# Patient Record
Sex: FEMALE | Race: WHITE | NOT HISPANIC OR LATINO | Employment: OTHER | URBAN - METROPOLITAN AREA
[De-identification: names, ages, dates, MRNs, and addresses within clinical notes are randomized per-mention and may not be internally consistent; named-entity substitution may affect disease eponyms.]

---

## 2017-01-05 DIAGNOSIS — M25.552 PAIN OF LEFT HIP JOINT: ICD-10-CM

## 2017-01-05 RX ORDER — IBUPROFEN 600 MG/1
600 TABLET ORAL EVERY 6 HOURS PRN
Qty: 30 TABLET | Refills: 1 | Status: SHIPPED | OUTPATIENT
Start: 2017-01-05 | End: 2017-02-21 | Stop reason: SDUPTHER

## 2017-01-05 NOTE — TELEPHONE ENCOUNTER
Pt Pharmacy called requesting a refill of Ibuprofen 600 mg. Pt last seen on 11/30/16 by Dr Dunn.

## 2017-01-06 NOTE — TELEPHONE ENCOUNTER
Called pt no answer. Left an detailed voice message for pt regarding her rx Ibuprofen 600 mg being approved for an refill an sent to pharmacy.

## 2017-01-25 ENCOUNTER — TELEPHONE (OUTPATIENT)
Dept: UROGYNECOLOGY | Facility: CLINIC | Age: 82
End: 2017-01-25

## 2017-01-25 NOTE — TELEPHONE ENCOUNTER
Called pt no answer. Left an detailed voice message for pt to call the office to reschedule missed apt. That was scheduled for today at 2:30 pm.

## 2017-01-30 RX ORDER — MELOXICAM 15 MG/1
TABLET ORAL
Qty: 30 TABLET | Refills: 0 | OUTPATIENT
Start: 2017-01-30

## 2017-02-06 RX ORDER — CIPROFLOXACIN 500 MG/1
TABLET ORAL
Qty: 14 TABLET | Refills: 0 | Status: ON HOLD | OUTPATIENT
Start: 2017-02-06 | End: 2017-05-23 | Stop reason: HOSPADM

## 2017-02-18 ENCOUNTER — TELEPHONE (OUTPATIENT)
Dept: INTERNAL MEDICINE | Facility: CLINIC | Age: 82
End: 2017-02-18

## 2017-02-18 NOTE — TELEPHONE ENCOUNTER
Spoke with the patient who advised that her heart was pounding, complained of a headache, and chest pain. She was concerned that she may be having a heart attack advised the patient to call 911 immediately after getting off the phone.

## 2017-02-18 NOTE — TELEPHONE ENCOUNTER
----- Message from Twyla Vanegas sent at 2/18/2017 10:39 AM CST -----  Contact: Self. Call [phone]  Caller complained of chest pains and pounding cooper. She thought she maybe having an heart attack. Call given to Alina.

## 2017-02-20 RX ORDER — AMLODIPINE BESYLATE 5 MG/1
TABLET ORAL
Qty: 30 TABLET | Refills: 5 | Status: SHIPPED | OUTPATIENT
Start: 2017-02-20 | End: 2017-04-10 | Stop reason: SDUPTHER

## 2017-02-21 DIAGNOSIS — M25.552 PAIN OF LEFT HIP JOINT: ICD-10-CM

## 2017-02-21 RX ORDER — LEVOTHYROXINE SODIUM 50 UG/1
50 TABLET ORAL
Qty: 30 TABLET | Refills: 5 | Status: SHIPPED | OUTPATIENT
Start: 2017-02-21 | End: 2017-04-10 | Stop reason: SDUPTHER

## 2017-02-21 RX ORDER — IBUPROFEN 600 MG/1
600 TABLET ORAL EVERY 6 HOURS PRN
Qty: 30 TABLET | Refills: 5 | Status: ON HOLD | OUTPATIENT
Start: 2017-02-21 | End: 2018-01-12

## 2017-03-01 RX ORDER — MELOXICAM 15 MG/1
TABLET ORAL
Qty: 30 TABLET | Refills: 0 | OUTPATIENT
Start: 2017-03-01

## 2017-03-27 ENCOUNTER — TELEPHONE (OUTPATIENT)
Dept: UROGYNECOLOGY | Facility: CLINIC | Age: 82
End: 2017-03-27

## 2017-03-27 NOTE — TELEPHONE ENCOUNTER
Spoke to the pharmacist Natalya with [facility] and called in pt's 90 day supply Ditropan XL 5 mg tablets.

## 2017-03-30 RX ORDER — MELOXICAM 15 MG/1
TABLET ORAL
Qty: 30 TABLET | Refills: 0 | OUTPATIENT
Start: 2017-03-30

## 2017-03-30 RX ORDER — TRAMADOL HYDROCHLORIDE 50 MG/1
TABLET ORAL
Qty: 90 TABLET | Refills: 1 | Status: SHIPPED | OUTPATIENT
Start: 2017-03-30 | End: 2017-04-29 | Stop reason: SDUPTHER

## 2017-04-10 DIAGNOSIS — R39.15 URINARY URGENCY: ICD-10-CM

## 2017-04-10 DIAGNOSIS — N39.41 URGE INCONTINENCE: ICD-10-CM

## 2017-04-10 RX ORDER — LEVOTHYROXINE SODIUM 50 UG/1
50 TABLET ORAL
Qty: 90 TABLET | Refills: 1 | Status: SHIPPED | OUTPATIENT
Start: 2017-04-10

## 2017-04-10 RX ORDER — AMLODIPINE BESYLATE 5 MG/1
5 TABLET ORAL DAILY
Qty: 90 TABLET | Refills: 1 | Status: ON HOLD | OUTPATIENT
Start: 2017-04-10 | End: 2017-05-25

## 2017-04-10 NOTE — TELEPHONE ENCOUNTER
----- Message from Niru Ritchie sent at 4/10/2017  3:40 PM CDT -----  Contact: [facility]/ [phone]   Type: Rx    Name of medication(s): levothyroxine (SYNTHROID) 50 MCG tablet and amlodipine (NORVASC) 5 MG tablet    Is this a refill? New rx? Refill     Who prescribed medication? Dr. Espinoza     Pharmacy Name, Phone, & Location: [facility]     Comments: [facility] is calling to have a 90 day supply. Please call and advise     Thank you

## 2017-04-10 NOTE — TELEPHONE ENCOUNTER
----- Message from Catherine Pace sent at 4/10/2017  3:38 PM CDT -----  Contact: [id number]   _  1st Request  _  2nd Request  _  3rd Request        Who: [id number]     Why: states the patient is requesting a 90 day supply on Rx mirabegron (MYRBETRIQ) 25 mg Tb24 ER tablet    What Number to Call Back: mirabegron (MYRBETRIQ) 25 mg Tb24 ER tablet    When to Expect a call back: (Before the end of the day)   -- if call after 3:00 call back will be tomorrow.

## 2017-04-21 DIAGNOSIS — E11.9 TYPE 2 DIABETES MELLITUS WITHOUT COMPLICATION: ICD-10-CM

## 2017-04-22 ENCOUNTER — NURSE TRIAGE (OUTPATIENT)
Dept: ADMINISTRATIVE | Facility: CLINIC | Age: 82
End: 2017-04-22

## 2017-04-22 NOTE — TELEPHONE ENCOUNTER
"  Reason for Disposition   Age > 60 years    Answer Assessment - Initial Assessment Questions  1. DESCRIPTION: "Please describe your heart rate or heart beat that you are having" (e.g., fast/slow, regular/irregular, skipped or extra beats, "palpitations")      fast  2. ONSET: "When did it start?" (Minutes, hours or days)       This morning early  3. DURATION: "How long does it last" (e.g., seconds, minutes, hours)      Going and coming  4. PATTERN "Does it come and go, or has it been constant since it started?"  "Does it get worse with exertion?"   "Are you feeling it now?"      Comes and goes  5. TAP: "Using your hand, can you tap out what you are feeling on a chair or table in front of you, so that I can hear?" (Note: not all patients can do this)        unable  6. HEART RATE: "Can you tell me your heart rate?" "How many beats in 15 seconds?"  (Note: not all patients can do this)        Calming down alot  7. RECURRENT SYMPTOM: "Have you ever had this before?" If so, ask: "When was the last time?" and "What happened that time?"       Yes,nothing  8. CAUSE: "What do you think is causing the palpitations?"      unsure  9. CARDIAC HISTORY: "Do you have any history of heart disease?" (e.g., heart attack, angina, bypass surgery, angioplasty, arrhythmia)       HBP  10. OTHER SYMPTOMS: "Do you have any other symptoms?" (e.g., dizziness, chest pain, sweating, difficulty breathing)      no  11. PREGNANCY: "Is there any chance you are pregnant?" "When was your last menstrual period?"      Post menopausal    Protocols used: ST HEART RATE AND HEART BEAT [id number]    "

## 2017-04-24 ENCOUNTER — TELEPHONE (OUTPATIENT)
Dept: INTERNAL MEDICINE | Facility: CLINIC | Age: 82
End: 2017-04-24

## 2017-04-24 NOTE — TELEPHONE ENCOUNTER
Left message to inquire if she had gone to the ER or does she need to be seen. Do not see her on our schedule.

## 2017-04-27 ENCOUNTER — TELEPHONE (OUTPATIENT)
Dept: INTERNAL MEDICINE | Facility: CLINIC | Age: 82
End: 2017-04-27

## 2017-04-27 DIAGNOSIS — Z00.00 ANNUAL PHYSICAL EXAM: Primary | ICD-10-CM

## 2017-04-27 DIAGNOSIS — E78.5 DYSLIPIDEMIA: ICD-10-CM

## 2017-04-27 DIAGNOSIS — I10 HTN, GOAL BELOW 130/80: ICD-10-CM

## 2017-04-27 DIAGNOSIS — E11.9 TYPE 2 DIABETES MELLITUS WITHOUT COMPLICATION, WITHOUT LONG-TERM CURRENT USE OF INSULIN: ICD-10-CM

## 2017-04-29 RX ORDER — MELOXICAM 15 MG/1
TABLET ORAL
Qty: 30 TABLET | Refills: 0 | OUTPATIENT
Start: 2017-04-29

## 2017-05-01 RX ORDER — TRAMADOL HYDROCHLORIDE 50 MG/1
TABLET ORAL
Qty: 90 TABLET | Refills: 4 | Status: ON HOLD | OUTPATIENT
Start: 2017-05-01 | End: 2017-05-23 | Stop reason: HOSPADM

## 2017-05-22 ENCOUNTER — HOSPITAL ENCOUNTER (INPATIENT)
Facility: HOSPITAL | Age: 82
LOS: 2 days | Discharge: HOME HEALTH | DRG: 689 | End: 2017-05-26
Attending: EMERGENCY MEDICINE | Admitting: EMERGENCY MEDICINE
Payer: MEDICARE

## 2017-05-22 DIAGNOSIS — M25.572 LEFT ANKLE PAIN: ICD-10-CM

## 2017-05-22 DIAGNOSIS — M25.561 BILATERAL CHRONIC KNEE PAIN: Primary | ICD-10-CM
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
## 2017-05-22 DIAGNOSIS — G93.41 ENCEPHALOPATHY, METABOLIC: ICD-10-CM

## 2017-05-22 DIAGNOSIS — G89.29 BILATERAL CHRONIC KNEE PAIN: Primary | ICD-10-CM

## 2017-05-22 DIAGNOSIS — M25.552 LEFT HIP PAIN: ICD-10-CM

## 2017-05-22 DIAGNOSIS — M25.562 BILATERAL CHRONIC KNEE PAIN: Primary | ICD-10-CM

## 2017-05-22 DIAGNOSIS — N17.9 AKI (ACUTE KIDNEY INJURY): ICD-10-CM

## 2017-05-22 LAB
ALBUMIN SERPL BCP-MCNC: 3.8 G/DL
ALP SERPL-CCNC: 87 U/L
ALT SERPL W/O P-5'-P-CCNC: 20 U/L
AMORPH CRY UR QL COMP ASSIST: ABNORMAL
ANION GAP SERPL CALC-SCNC: 15 MMOL/L
AST SERPL-CCNC: 44 U/L
BACTERIA #/AREA URNS AUTO: ABNORMAL /HPF
BACTERIA #/AREA URNS AUTO: ABNORMAL /HPF
BASOPHILS # BLD AUTO: 0.03 K/UL
BASOPHILS NFR BLD: 0.3 %
BILIRUB SERPL-MCNC: 0.5 MG/DL
BILIRUB UR QL STRIP: NEGATIVE
BILIRUB UR QL STRIP: NEGATIVE
BUN SERPL-MCNC: 50 MG/DL
CALCIUM SERPL-MCNC: 9.6 MG/DL
CHLORIDE SERPL-SCNC: 103 MMOL/L
CLARITY UR REFRACT.AUTO: ABNORMAL
CLARITY UR REFRACT.AUTO: CLEAR
CO2 SERPL-SCNC: 21 MMOL/L
COLOR UR AUTO: ABNORMAL
COLOR UR AUTO: YELLOW
CREAT SERPL-MCNC: 1.5 MG/DL
CREAT UR-MCNC: 169 MG/DL
DIFFERENTIAL METHOD: ABNORMAL
EOSINOPHIL # BLD AUTO: 0 K/UL
EOSINOPHIL NFR BLD: 0 %
ERYTHROCYTE [DISTWIDTH] IN BLOOD BY AUTOMATED COUNT: 14.1 %
EST. GFR  (AFRICAN AMERICAN): 35.9 ML/MIN/1.73 M^2
EST. GFR  (NON AFRICAN AMERICAN): 31.1 ML/MIN/1.73 M^2
GLUCOSE SERPL-MCNC: 228 MG/DL
GLUCOSE UR QL STRIP: ABNORMAL
GLUCOSE UR QL STRIP: ABNORMAL
HCT VFR BLD AUTO: 39.1 %
HGB BLD-MCNC: 13 G/DL
HGB UR QL STRIP: ABNORMAL
HGB UR QL STRIP: NEGATIVE
HYALINE CASTS UR QL AUTO: 5 /LPF
HYALINE CASTS UR QL AUTO: 93 /LPF
KETONES UR QL STRIP: ABNORMAL
KETONES UR QL STRIP: ABNORMAL
LACTATE SERPL-SCNC: 1 MMOL/L
LEUKOCYTE ESTERASE UR QL STRIP: ABNORMAL
LEUKOCYTE ESTERASE UR QL STRIP: NEGATIVE
LYMPHOCYTES # BLD AUTO: 1.9 K/UL
LYMPHOCYTES NFR BLD: 17.1 %
MCH RBC QN AUTO: 28.6 PG
MCHC RBC AUTO-ENTMCNC: 33.2 %
MCV RBC AUTO: 86 FL
MICROSCOPIC COMMENT: ABNORMAL
MICROSCOPIC COMMENT: ABNORMAL
MONOCYTES # BLD AUTO: 0.7 K/UL
MONOCYTES NFR BLD: 6.6 %
NEUTROPHILS # BLD AUTO: 8.4 K/UL
NEUTROPHILS NFR BLD: 75.6 %
NITRITE UR QL STRIP: NEGATIVE
NITRITE UR QL STRIP: NEGATIVE
PH UR STRIP: 5 [PH] (ref 5–8)
PH UR STRIP: 5 [PH] (ref 5–8)
PLATELET # BLD AUTO: 239 K/UL
PMV BLD AUTO: 10.7 FL
POCT GLUCOSE: 274 MG/DL (ref 70–110)
POTASSIUM SERPL-SCNC: 3.9 MMOL/L
PROT SERPL-MCNC: 7 G/DL
PROT UR QL STRIP: ABNORMAL
PROT UR QL STRIP: NEGATIVE
RBC # BLD AUTO: 4.54 M/UL
RBC #/AREA URNS AUTO: 1 /HPF (ref 0–4)
RBC #/AREA URNS AUTO: 1 /HPF (ref 0–4)
SODIUM SERPL-SCNC: 139 MMOL/L
SODIUM UR-SCNC: 88 MMOL/L
SP GR UR STRIP: 1.02 (ref 1–1.03)
SP GR UR STRIP: 1.02 (ref 1–1.03)
SQUAMOUS #/AREA URNS AUTO: 0 /HPF
SQUAMOUS #/AREA URNS AUTO: 31 /HPF
URN SPEC COLLECT METH UR: ABNORMAL
URN SPEC COLLECT METH UR: ABNORMAL
UROBILINOGEN UR STRIP-ACNC: 2 EU/DL
UROBILINOGEN UR STRIP-ACNC: NEGATIVE EU/DL
WBC # BLD AUTO: 11.16 K/UL
WBC #/AREA URNS AUTO: 0 /HPF (ref 0–5)
WBC #/AREA URNS AUTO: 7 /HPF (ref 0–5)

## 2017-05-22 PROCEDURE — 84300 ASSAY OF URINE SODIUM: CPT

## 2017-05-22 PROCEDURE — 99284 EMERGENCY DEPT VISIT MOD MDM: CPT | Mod: ,,, | Performed by: EMERGENCY MEDICINE

## 2017-05-22 PROCEDURE — 82570 ASSAY OF URINE CREATININE: CPT

## 2017-05-22 PROCEDURE — 93010 ELECTROCARDIOGRAM REPORT: CPT | Mod: ,,, | Performed by: INTERNAL MEDICINE

## 2017-05-22 PROCEDURE — 36415 COLL VENOUS BLD VENIPUNCTURE: CPT

## 2017-05-22 PROCEDURE — 83605 ASSAY OF LACTIC ACID: CPT

## 2017-05-22 PROCEDURE — 99285 EMERGENCY DEPT VISIT HI MDM: CPT

## 2017-05-22 PROCEDURE — 25000003 PHARM REV CODE 250: Performed by: STUDENT IN AN ORGANIZED HEALTH CARE EDUCATION/TRAINING PROGRAM

## 2017-05-22 PROCEDURE — 81001 URINALYSIS AUTO W/SCOPE: CPT | Mod: 91

## 2017-05-22 PROCEDURE — 87086 URINE CULTURE/COLONY COUNT: CPT

## 2017-05-22 PROCEDURE — 25000003 PHARM REV CODE 250: Performed by: EMERGENCY MEDICINE

## 2017-05-22 PROCEDURE — 87040 BLOOD CULTURE FOR BACTERIA: CPT | Mod: 59

## 2017-05-22 PROCEDURE — 80053 COMPREHEN METABOLIC PANEL: CPT

## 2017-05-22 PROCEDURE — 82962 GLUCOSE BLOOD TEST: CPT

## 2017-05-22 PROCEDURE — G0378 HOSPITAL OBSERVATION PER HR: HCPCS

## 2017-05-22 PROCEDURE — 81001 URINALYSIS AUTO W/SCOPE: CPT

## 2017-05-22 PROCEDURE — 85025 COMPLETE CBC W/AUTO DIFF WBC: CPT

## 2017-05-22 PROCEDURE — 87040 BLOOD CULTURE FOR BACTERIA: CPT

## 2017-05-22 PROCEDURE — 99220 PR INITIAL OBSERVATION CARE,LEVL III: CPT | Mod: ,,, | Performed by: HOSPITALIST

## 2017-05-22 RX ORDER — GLUCAGON 1 MG
1 KIT INJECTION
Status: DISCONTINUED | OUTPATIENT
Start: 2017-05-22 | End: 2017-05-26 | Stop reason: HOSPADM

## 2017-05-22 RX ORDER — AMLODIPINE BESYLATE 5 MG/1
5 TABLET ORAL
Status: COMPLETED | OUTPATIENT
Start: 2017-05-22 | End: 2017-05-22

## 2017-05-22 RX ORDER — LISINOPRIL 20 MG/1
40 TABLET ORAL
Status: COMPLETED | OUTPATIENT
Start: 2017-05-22 | End: 2017-05-22

## 2017-05-22 RX ORDER — LEVOTHYROXINE SODIUM 50 UG/1
50 TABLET ORAL
Status: DISCONTINUED | OUTPATIENT
Start: 2017-05-23 | End: 2017-05-26 | Stop reason: HOSPADM

## 2017-05-22 RX ORDER — AMLODIPINE BESYLATE 5 MG/1
5 TABLET ORAL DAILY
Status: DISCONTINUED | OUTPATIENT
Start: 2017-05-23 | End: 2017-05-24

## 2017-05-22 RX ORDER — ASPIRIN 81 MG/1
81 TABLET ORAL DAILY
Status: DISCONTINUED | OUTPATIENT
Start: 2017-05-23 | End: 2017-05-26 | Stop reason: HOSPADM

## 2017-05-22 RX ORDER — LISINOPRIL 20 MG/1
40 TABLET ORAL DAILY
Status: DISCONTINUED | OUTPATIENT
Start: 2017-05-23 | End: 2017-05-22

## 2017-05-22 RX ORDER — SODIUM CHLORIDE 9 MG/ML
1000 INJECTION, SOLUTION INTRAVENOUS
Status: COMPLETED | OUTPATIENT
Start: 2017-05-22 | End: 2017-05-22

## 2017-05-22 RX ORDER — POLYETHYLENE GLYCOL 3350 17 G/17G
17 POWDER, FOR SOLUTION ORAL DAILY
Status: DISCONTINUED | OUTPATIENT
Start: 2017-05-23 | End: 2017-05-22

## 2017-05-22 RX ORDER — PRAVASTATIN SODIUM 40 MG/1
40 TABLET ORAL DAILY
Status: DISCONTINUED | OUTPATIENT
Start: 2017-05-23 | End: 2017-05-26 | Stop reason: HOSPADM

## 2017-05-22 RX ORDER — TRAMADOL HYDROCHLORIDE 50 MG/1
100 TABLET ORAL
Status: COMPLETED | OUTPATIENT
Start: 2017-05-22 | End: 2017-05-22

## 2017-05-22 RX ORDER — IBUPROFEN 200 MG
24 TABLET ORAL
Status: DISCONTINUED | OUTPATIENT
Start: 2017-05-22 | End: 2017-05-26 | Stop reason: HOSPADM

## 2017-05-22 RX ORDER — ACETAMINOPHEN 325 MG/1
650 TABLET ORAL EVERY 6 HOURS PRN
Status: DISCONTINUED | OUTPATIENT
Start: 2017-05-22 | End: 2017-05-26 | Stop reason: HOSPADM

## 2017-05-22 RX ORDER — HEPARIN SODIUM 5000 [USP'U]/ML
5000 INJECTION, SOLUTION INTRAVENOUS; SUBCUTANEOUS EVERY 8 HOURS
Status: DISCONTINUED | OUTPATIENT
Start: 2017-05-22 | End: 2017-05-22

## 2017-05-22 RX ORDER — INSULIN ASPART 100 [IU]/ML
0-5 INJECTION, SOLUTION INTRAVENOUS; SUBCUTANEOUS
Status: DISCONTINUED | OUTPATIENT
Start: 2017-05-22 | End: 2017-05-26 | Stop reason: HOSPADM

## 2017-05-22 RX ORDER — AMOXICILLIN 250 MG
1 CAPSULE ORAL 2 TIMES DAILY
Status: DISCONTINUED | OUTPATIENT
Start: 2017-05-22 | End: 2017-05-26 | Stop reason: HOSPADM

## 2017-05-22 RX ORDER — HYDRALAZINE HYDROCHLORIDE 25 MG/1
25 TABLET, FILM COATED ORAL EVERY 8 HOURS PRN
Status: DISCONTINUED | OUTPATIENT
Start: 2017-05-22 | End: 2017-05-23

## 2017-05-22 RX ORDER — METOPROLOL SUCCINATE 25 MG/1
25 TABLET, EXTENDED RELEASE ORAL DAILY
Status: DISCONTINUED | OUTPATIENT
Start: 2017-05-22 | End: 2017-05-24

## 2017-05-22 RX ORDER — POLYETHYLENE GLYCOL 3350 17 G/17G
17 POWDER, FOR SOLUTION ORAL DAILY
Status: DISCONTINUED | OUTPATIENT
Start: 2017-05-22 | End: 2017-05-26 | Stop reason: HOSPADM

## 2017-05-22 RX ORDER — IBUPROFEN 200 MG
16 TABLET ORAL
Status: DISCONTINUED | OUTPATIENT
Start: 2017-05-22 | End: 2017-05-26 | Stop reason: HOSPADM

## 2017-05-22 RX ADMIN — TRAMADOL HYDROCHLORIDE 100 MG: 50 TABLET, FILM COATED ORAL at 11:05

## 2017-05-22 RX ADMIN — STANDARDIZED SENNA CONCENTRATE AND DOCUSATE SODIUM 1 TABLET: 8.6; 5 TABLET, FILM COATED ORAL at 10:05

## 2017-05-22 RX ADMIN — AMLODIPINE BESYLATE 5 MG: 5 TABLET ORAL at 01:05

## 2017-05-22 RX ADMIN — METOPROLOL SUCCINATE 25 MG: 25 TABLET, EXTENDED RELEASE ORAL at 01:05

## 2017-05-22 RX ADMIN — LISINOPRIL 40 MG: 20 TABLET ORAL at 04:05

## 2017-05-22 RX ADMIN — POLYETHYLENE GLYCOL 3350 17 G: 17 POWDER, FOR SOLUTION ORAL at 10:05

## 2017-05-22 RX ADMIN — SODIUM CHLORIDE 1000 ML: 0.9 INJECTION, SOLUTION INTRAVENOUS at 06:05

## 2017-05-22 RX ADMIN — SODIUM CHLORIDE 1000 ML: 0.9 INJECTION, SOLUTION INTRAVENOUS at 04:05

## 2017-05-22 NOTE — ED NOTES
Patient identifiers verified and correct for Carin Nicholas.    LOC: The patient is awake, alert and aware of environment with an appropriate affect, the patient is oriented to person and year and speaking appropriately.  APPEARANCE: Patient resting comfortably and in no acute distress, patient is clean and well groomed, patient's clothing is properly fastened.  SKIN: The skin is warm and dry, color consistent with ethnicity, patient has normal skin turgor and moist mucus membranes, skin intact, no breakdown or bruising noted.  MUSCULOSKELETAL: Patient moving all extremities spontaneously, no obvious swelling or deformities noted.  RESPIRATORY: Airway is open and patent, respirations are spontaneous, patient has a normal effort and rate, no accessory muscle use noted, bilateral breath sounds clear  CARDIAC: Patient has a normal rate and regular rhythm, no periphreal edema noted, capillary refill < 3 seconds.  ABDOMEN: Soft and non tender to palpation, no distention noted, normoactive bowel sounds present in all four quadrants.  NEUROLOGIC: PERRL, 3mm bilaterally, eyes open spontaneously, behavior appropriate to situation, follows commands, facial expression symmetrical, bilateral hand grasp equal and even, purposeful motor response noted, normal sensation in all extremities when touched with a finger.

## 2017-05-22 NOTE — ED NOTES
Patient assisted to the toilet with nurse assist. Unsteady gait noted. Small amount of concentrated urine output noted.

## 2017-05-22 NOTE — ED PROVIDER NOTES
Encounter Date: 5/22/2017    SCRIBE #1 NOTE: I, Rory Dickinson, am scribing for, and in the presence of,  Jorden Givens MD. I have scribed the entire note.       History     Chief Complaint   Patient presents with    Back Pain     patient have multiple medical complaints, patient states that she's been having pain for a while and is gradually getting worst    Hip Pain    Abdominal Pain     Review of patient's allergies indicates:  No Known Allergies  This is a 87 y.o. female who presents for evaluation of left hip pain. A few weeks ago she fell on left hip, mechanical fall, and subsequently has had pain that has been worsening since. Exacerbated by movement, relieved by rest. She also complains of bilateral knee pain which she states is chronic but progressively worsening. No acute injury. Similarly also complains of low back pain w/o injury which is chronic and progressively worsening over time, non radiating described as aching and dull. No further complaints or concerns at this time.       The history is provided by the patient and medical records.     Past Medical History:   Diagnosis Date    Arthritis     Diabetes mellitus, type 2     Hyperlipidemia     Hypertension     Hypothyroidism     Rheumatoid arthritis      Past Surgical History:   Procedure Laterality Date    APPENDECTOMY      HYSTERECTOMY       Family History   Problem Relation Age of Onset    Cancer Brother      lung cancer    Cancer Brother      leg cancer     Social History   Substance Use Topics    Smoking status: Never Smoker    Smokeless tobacco: Not on file    Alcohol use No     Review of Systems   Constitutional: Negative for fever.   HENT: Negative for sore throat.    Respiratory: Negative for shortness of breath.    Cardiovascular: Negative for chest pain.   Gastrointestinal: Positive for abdominal pain and constipation. Negative for nausea.   Genitourinary: Negative for dysuria.   Musculoskeletal: Positive for arthralgias (left  hip and bilateral knees) and back pain.   Skin: Negative for rash.   Neurological: Negative for weakness and numbness.   Hematological: Does not bruise/bleed easily.       Physical Exam     Initial Vitals [05/22/17 1000]   BP Pulse Resp Temp SpO2   (!) 152/96 65 18 98.4 °F (36.9 °C) 99 %     Physical Exam    Nursing note and vitals reviewed.  Constitutional: She appears well-developed and well-nourished. No distress.   HENT:   Head: Normocephalic and atraumatic.   Eyes: Conjunctivae and EOM are normal. Pupils are equal, round, and reactive to light.   Neck: Normal range of motion. Neck supple.   Cardiovascular: Normal rate, regular rhythm, normal heart sounds and intact distal pulses.   Pulmonary/Chest: Breath sounds normal. No respiratory distress. She has no wheezes. She has no rhonchi. She has no rales.   Abdominal: Soft. She exhibits no distension. There is no tenderness. There is no rebound and no guarding.   Musculoskeletal:   No lumbar tenderness. Lipoma centered around T11-T12 approximately 4X4 cm round soft non indurated non erythematous. Full ROM of both LEs without limitation. Mild crepitus to left knee. Mild warmth to right knee   Neurological: She is alert and oriented to person, place, and time. She has normal strength. No cranial nerve deficit or sensory deficit.   Skin: Skin is warm and dry. No rash noted.         ED Course   Procedures  Labs Reviewed   POCT GLUCOSE - Abnormal; Notable for the following:        Result Value    POCT Glucose 274 (*)     All other components within normal limits             Medical Decision Making:   History:   Old Medical Records: I decided to obtain old medical records.  Initial Assessment:   87 year old female presents with various musculoskeletal complaints, chronic in nature, progressively worsening, Hx of RA. Will get imaging and get pain control.      Clinical Tests:   Radiological Study: Ordered and Reviewed            Scribe Attestation:   Scribe #1: I  performed the above scribed service and the documentation accurately describes the services I performed. I attest to the accuracy of the note.    Attending Attestation:           Physician Attestation for Scribe:  Physician Attestation Statement for Scribe #1: I, Jorden Givens MD, reviewed documentation, as scribed by Rory Dickinson in my presence, and it is both accurate and complete.         Attending ED Notes:   X-ray reports revealed no significant acute pathology.  Doubt fracture subluxation.  Overall impression is status post fall, arthralgias.  Patient will be managed with symptomatic treatment as outpatient.  Needs follow-up with PCP for pain management.          ED Course     Clinical Impression:   The primary encounter diagnosis was Left hip pain. Diagnoses of Fall, Left ankle pain, and Bilateral chronic knee pain were also pertinent to this visit.    Disposition:   Disposition: Discharged  Condition: Stable       Jorden Givens MD  05/22/17 7580

## 2017-05-22 NOTE — ED NOTES
Patient reporting she has not taken any of her meds in 3 days. BP elevated ~180, HR ~110. Dr. Stafford notified.  Will reorder home meds.

## 2017-05-22 NOTE — H&P
HISTORY & PHYSICAL  Hospital Medicine    [place] [facility]    PRESENTING HISTORY     Chief Complaint/Reason for Admission:  VIOLETA    History of Present Illness:  Ms. Carin Nicholas is a 87 y.o. female (prefers Vietnamese although may speak English) with HTN, T2DM with neuropathy/CKD, HLD, RA/OA, hypothyroidism, aortic atherosclerosis, chronic left hip pain, obesity, ?opiate dependence, and urge incontinence who presented to the ED via EMS as patient was found down in her apartment. Patient is a poor historian and appears somewhat confused, only oriented to self. States she has had worsening back, left hip, and abdominal pain for the past few months which is what brought her in. When asked which medicine she takes for pain, she states Tylenol but does not know all of her medications and continues to tell us to look in her cabinet for them as she thinks she is home. Patient states last BM 3-4 days ago.   States she has not taken her medicine for the past 3 days although when asked why, she tells different stories (make her feel dizzy vs make her cough vs unable to fill prescription). She does admit to decreased appetite.     Per patient's son (lives in [place]), he spoke with the  who stated pt was found outside her door confused. There were no reports of a fall to the son. Son states he last spoke with her Saturday (3 days prior) and she appeared to be oriented although he did note slurring of speech. He is unsure of her primary care doctor as patient also seen at [facility], however, per chart review, patient has also followed with Dr. Arash Espinoza.     Spoke with patient's pharmacist at [facility], who states has noticed steady decline in patient's mental status. Patient calling multiple times not remembering previously spoke with pharmacist. Also noted patient mixing her medications. Noted patient appears more weak and fatigued when she does present to pharmacy.     Review of Systems:  Constitutional:  Negative for fever, chills.   HEENT: Negative for visual disturbance, sore throat.    Respiratory: Negative for cough, shortness of breath.    Cardiovascular: Negative for chest pain.   Gastrointestinal: Positive for abdominal pain and constipation. Negative for nausea.   Genitourinary: Positive for dysuria.   Musculoskeletal: Positive for arthralgias and back pain.   Skin: Negative for rash.   Neurological: Negative for weakness and numbness.       PAST HISTORY:     Past Medical History:   Diagnosis Date    Arthritis     Diabetes mellitus, type 2     Hyperlipidemia     Hypertension     Hypothyroidism     Rheumatoid arthritis        Past Surgical History:   Procedure Laterality Date    APPENDECTOMY      HYSTERECTOMY         Family History   Problem Relation Age of Onset    Cancer Brother      lung cancer    Cancer Brother      leg cancer       Social History     Social History    Marital status:      Spouse name: N/A    Number of children: 1    Years of education: N/A     Occupational History    Retired      Social History Main Topics    Smoking status: Never Smoker    Smokeless tobacco: None    Alcohol use No    Drug use: No    Sexual activity: No     Other Topics Concern    None     Social History Narrative    None       MEDICATIONS & ALLERGIES:     No current facility-administered medications on file prior to encounter.      Current Outpatient Prescriptions on File Prior to Encounter   Medication Sig Dispense Refill    amlodipine (NORVASC) 5 MG tablet Take 1 tablet (5 mg total) by mouth once daily. 90 tablet 1    aspirin (ECOTRIN) 81 MG EC tablet Take 81 mg by mouth once daily.      cetirizine (ZYRTEC) 10 MG tablet Take 1 tablet (10 mg total) by mouth once daily. 90 tablet 3    ciprofloxacin HCl (CIPRO) 500 MG tablet TAKE ONE TABLET BY MOUTH TWICE DAILY 14 tablet 0    dicyclomine (BENTYL) 10 MG capsule Take 1 capsule (10 mg total) by mouth 3 (three) times daily as needed  "(abdominal cramping). 60 capsule 5    docusate sodium (COLACE) 50 MG capsule Take 1 capsule (50 mg total) by mouth 2 (two) times daily. 60 capsule 4    fluticasone (FLONASE) 50 mcg/actuation nasal spray INSTILL ONE SPRAY IN EACH NOSTRIL ONCE DAILY 16 g 1    ibuprofen (ADVIL,MOTRIN) 600 MG tablet Take 1 tablet (600 mg total) by mouth every 6 (six) hours as needed for Pain. 30 tablet 5    insulin aspart protamine-insulin aspart (NOVOLOG 70/30) 100 unit/mL (70-30) InPn pen Inject 16 units into skin with breakfast and 7 units with dinner 1 Box 6    levothyroxine (SYNTHROID) 50 MCG tablet Take 1 tablet (50 mcg total) by mouth before breakfast. 90 tablet 1    lisinopril (PRINIVIL,ZESTRIL) 40 MG tablet TAKE ONE TABLET BY MOUTH EVERY DAY 90 tablet 3    metoprolol succinate (TOPROL-XL) 25 MG 24 hr tablet Take 1 tablet (25 mg total) by mouth once daily. 90 tablet 0    mirabegron (MYRBETRIQ) 25 mg Tb24 ER tablet Take 1 tablet (25 mg total) by mouth once daily. 90 tablet 4    pen needle, diabetic (BD INSULIN PEN NEEDLE UF MINI) 31 gauge x 3/16" Ndle 1 each by Misc.(Non-Drug; Combo Route) route every morning. To use twice daily with Novolog 70/30 100 each 11    pravastatin (PRAVACHOL) 40 MG tablet Take 1 tablet (40 mg total) by mouth once daily. 90 tablet 0    PREMARIN vaginal cream Use vaginally Every other day      PROCTOSOL HC 2.5 % rectal cream       tramadol (ULTRAM) 50 mg tablet TAKE ONE TABLET BY MOUTH EVERY SIX HOURS AS NEEDED FOR PAIN 90 tablet 4        Review of patient's allergies indicates:  No Known Allergies    OBJECTIVE:     Vital Signs:  Temp:  [97.6 °F (36.4 °C)-98.4 °F (36.9 °C)] 97.7 °F (36.5 °C)  Pulse:  [] 92  Resp:  [16-38] 16  SpO2:  [93 %-99 %] 98 %  BP: (144-199)/() 175/66  Body mass index is 32.5 kg/m².     Physical Exam:  Constitutional: She appears well-developed and well-nourished. No distress.   HENT:   Head: Normocephalic and atraumatic.   Eyes: Conjunctivae and EOM are " normal. Pupils are equal, round, and reactive to light.   Neck: Normal range of motion. Neck supple.   Cardiovascular: Normal rate, regular rhythm, normal heart sounds and intact distal pulses.   Pulmonary/Chest: Breath sounds normal. No respiratory distress. She has no wheezes. She has no rhonchi. She has no rales.   Abdominal: Soft. She exhibits no distension. States mild tenderness with deep palpation in lower abdomen.   Musculoskeletal: moves extremities spontaneously   Neurological: She is oriented to self only. Occasionally believes she is in her home.   Skin: Skin is warm and dry. No rash noted.     Laboratory  Lab Results   Component Value Date    WBC 11.16 05/22/2017    HGB 13.0 05/22/2017    HCT 39.1 05/22/2017    MCV 86 05/22/2017     05/22/2017       Recent Labs  Lab 05/22/17  1456   *      K 3.9      CO2 21*   BUN 50*   CREATININE 1.5*   CALCIUM 9.6     No results found for: INR, PROTIME  Lab Results   Component Value Date    HGBA1C 9.1 (H) 09/26/2016     Recent Labs      05/22/17   1259   POCTGLUCOSE  274*       Diagnostic Results:  Xray lumbar spine, bilateral knees, left hip, left ankle negative for acute fracture or dislocation.     ASSESSMENT & PLAN:     Current Problems List:  Active Hospital Problems    Diagnosis  POA    Left hip pain [M25.552]  Yes      Resolved Hospital Problems    Diagnosis Date Resolved POA   No resolved problems to display.         Problem Assessment & Treatment Plan:  Metabolic encephalopathy   - sepsis vs CVA vs dementia   - no leukocytosis or recorded fever, however, can not r/o infectious process   - lactate, UA, urine cx, bcxs  - CT head  - if lactate elevated, will order abdominal U/S given abdominal complaints and will consider starting on cipro and flagyl for abdominal process    VIOLETA   - likely pre-renal given patient's decrease in po intake   - baseline Cr 0.8 (1.5 on admission)   - urine studies     HTN   - norvasc 5 mg daily   - Toprol  25 mg daily  - holding lisinopril in setting of VIOLETA   - hydralazine prn     HLD  - continue pravastatin 40 mg daily     T2DM   - SSI     Hypothyroidism   - TSH   - levothyroxine 50 mcg daily     Arthritis   - tylenol prn for pain   - will avoid opioids given mental status     ?hx of falls   - Xrays negative for fracture or dislocation   - PT/OT     Constipation  - will start with miralax daily, senna doc bid   - will consider adding lactulose     Urge incontinence  - holding home oxybutynin and mirabegron in setting of possible AMS    DVT ppx: TEDs, SCDs  Diet: Diabetic 1800   Dispo: pending clinical improvement     Staff attestation to follow.     Signing Physician:  Stacy Velásquez MD

## 2017-05-23 ENCOUNTER — TELEPHONE (OUTPATIENT)
Dept: INTERNAL MEDICINE | Facility: CLINIC | Age: 82
End: 2017-05-23

## 2017-05-23 PROBLEM — E55.9 VITAMIN D INSUFFICIENCY: Status: ACTIVE | Noted: 2017-05-23

## 2017-05-23 PROBLEM — N17.9 AKI (ACUTE KIDNEY INJURY): Status: ACTIVE | Noted: 2017-05-23

## 2017-05-23 PROBLEM — G93.41 ENCEPHALOPATHY, METABOLIC: Status: ACTIVE | Noted: 2017-05-23

## 2017-05-23 LAB
25(OH)D3+25(OH)D2 SERPL-MCNC: 17 NG/ML
ALBUMIN SERPL BCP-MCNC: 3.5 G/DL
ALBUMIN SERPL BCP-MCNC: 3.7 G/DL
ALP SERPL-CCNC: 100 U/L
ALP SERPL-CCNC: 94 U/L
ALT SERPL W/O P-5'-P-CCNC: 21 U/L
ALT SERPL W/O P-5'-P-CCNC: 22 U/L
AMMONIA PLAS-SCNC: 30 UMOL/L
AMPHET+METHAMPHET UR QL: NEGATIVE
ANION GAP SERPL CALC-SCNC: 9 MMOL/L
ANION GAP SERPL CALC-SCNC: 9 MMOL/L
AST SERPL-CCNC: 43 U/L
AST SERPL-CCNC: 44 U/L
BACTERIA #/AREA URNS AUTO: ABNORMAL /HPF
BARBITURATES UR QL SCN>200 NG/ML: NEGATIVE
BASOPHILS # BLD AUTO: 0.02 K/UL
BASOPHILS # BLD AUTO: 0.03 K/UL
BASOPHILS NFR BLD: 0.3 %
BASOPHILS NFR BLD: 0.3 %
BENZODIAZ UR QL SCN>200 NG/ML: NEGATIVE
BILIRUB SERPL-MCNC: 0.3 MG/DL
BILIRUB SERPL-MCNC: 0.4 MG/DL
BILIRUB UR QL STRIP: NEGATIVE
BUN SERPL-MCNC: 34 MG/DL
BUN SERPL-MCNC: 45 MG/DL
BZE UR QL SCN: NEGATIVE
CALCIUM SERPL-MCNC: 9.2 MG/DL
CALCIUM SERPL-MCNC: 9.3 MG/DL
CANNABINOIDS UR QL SCN: NEGATIVE
CHLORIDE SERPL-SCNC: 102 MMOL/L
CHLORIDE SERPL-SCNC: 103 MMOL/L
CK SERPL-CCNC: 343 U/L
CLARITY UR REFRACT.AUTO: ABNORMAL
CO2 SERPL-SCNC: 24 MMOL/L
CO2 SERPL-SCNC: 26 MMOL/L
COLOR UR AUTO: YELLOW
CREAT SERPL-MCNC: 0.9 MG/DL
CREAT SERPL-MCNC: 0.9 MG/DL
CREAT UR-MCNC: 78 MG/DL
DIFFERENTIAL METHOD: NORMAL
DIFFERENTIAL METHOD: NORMAL
EOSINOPHIL # BLD AUTO: 0.2 K/UL
EOSINOPHIL # BLD AUTO: 0.2 K/UL
EOSINOPHIL NFR BLD: 2.5 %
EOSINOPHIL NFR BLD: 2.9 %
ERYTHROCYTE [DISTWIDTH] IN BLOOD BY AUTOMATED COUNT: 13.8 %
ERYTHROCYTE [DISTWIDTH] IN BLOOD BY AUTOMATED COUNT: 14.1 %
EST. GFR  (AFRICAN AMERICAN): >60 ML/MIN/1.73 M^2
EST. GFR  (AFRICAN AMERICAN): >60 ML/MIN/1.73 M^2
EST. GFR  (NON AFRICAN AMERICAN): 57.7 ML/MIN/1.73 M^2
EST. GFR  (NON AFRICAN AMERICAN): 57.7 ML/MIN/1.73 M^2
ESTIMATED AVG GLUCOSE: 209 MG/DL
ETHANOL UR-MCNC: <10 MG/DL
FOLATE SERPL-MCNC: 12.1 NG/ML
GLUCOSE SERPL-MCNC: 197 MG/DL
GLUCOSE SERPL-MCNC: 199 MG/DL
GLUCOSE UR QL STRIP: ABNORMAL
HBA1C MFR BLD HPLC: 8.9 %
HCT VFR BLD AUTO: 40.6 %
HCT VFR BLD AUTO: 41.5 %
HGB BLD-MCNC: 13 G/DL
HGB BLD-MCNC: 13.5 G/DL
HGB UR QL STRIP: NEGATIVE
INR PPP: 1
KETONES UR QL STRIP: ABNORMAL
LACTATE SERPL-SCNC: 1.2 MMOL/L
LEUKOCYTE ESTERASE UR QL STRIP: ABNORMAL
LYMPHOCYTES # BLD AUTO: 2 K/UL
LYMPHOCYTES # BLD AUTO: 2.1 K/UL
LYMPHOCYTES NFR BLD: 24.3 %
LYMPHOCYTES NFR BLD: 26.3 %
MAGNESIUM SERPL-MCNC: 2 MG/DL
MAGNESIUM SERPL-MCNC: 2.1 MG/DL
MCH RBC QN AUTO: 28.1 PG
MCH RBC QN AUTO: 28.2 PG
MCHC RBC AUTO-ENTMCNC: 32 %
MCHC RBC AUTO-ENTMCNC: 32.5 %
MCV RBC AUTO: 87 FL
MCV RBC AUTO: 88 FL
METHADONE UR QL SCN>300 NG/ML: NEGATIVE
MICROSCOPIC COMMENT: ABNORMAL
MONOCYTES # BLD AUTO: 0.8 K/UL
MONOCYTES # BLD AUTO: 0.8 K/UL
MONOCYTES NFR BLD: 10.3 %
MONOCYTES NFR BLD: 8.6 %
NEUTROPHILS # BLD AUTO: 4.6 K/UL
NEUTROPHILS # BLD AUTO: 5.6 K/UL
NEUTROPHILS NFR BLD: 59.8 %
NEUTROPHILS NFR BLD: 64 %
NITRITE UR QL STRIP: NEGATIVE
OPIATES UR QL SCN: NEGATIVE
PCP UR QL SCN>25 NG/ML: NEGATIVE
PH UR STRIP: 5 [PH] (ref 5–8)
PHOSPHATE SERPL-MCNC: 2.9 MG/DL
PLATELET # BLD AUTO: 232 K/UL
PLATELET # BLD AUTO: 262 K/UL
PMV BLD AUTO: 10.4 FL
PMV BLD AUTO: 10.5 FL
POCT GLUCOSE: 186 MG/DL (ref 70–110)
POCT GLUCOSE: 190 MG/DL (ref 70–110)
POCT GLUCOSE: 209 MG/DL (ref 70–110)
POCT GLUCOSE: 260 MG/DL (ref 70–110)
POTASSIUM SERPL-SCNC: 3.5 MMOL/L
POTASSIUM SERPL-SCNC: 3.6 MMOL/L
PROT SERPL-MCNC: 6.6 G/DL
PROT SERPL-MCNC: 7 G/DL
PROT UR QL STRIP: NEGATIVE
PROTHROMBIN TIME: 10.4 SEC
RBC # BLD AUTO: 4.62 M/UL
RBC # BLD AUTO: 4.78 M/UL
RBC #/AREA URNS AUTO: 1 /HPF (ref 0–4)
SODIUM SERPL-SCNC: 135 MMOL/L
SODIUM SERPL-SCNC: 138 MMOL/L
SP GR UR STRIP: 1.01 (ref 1–1.03)
SQUAMOUS #/AREA URNS AUTO: 1 /HPF
T4 FREE SERPL-MCNC: 1.12 NG/DL
TOXICOLOGY INFORMATION: NORMAL
TSH SERPL DL<=0.005 MIU/L-ACNC: 2.83 UIU/ML
URN SPEC COLLECT METH UR: ABNORMAL
UROBILINOGEN UR STRIP-ACNC: NEGATIVE EU/DL
VIT B12 SERPL-MCNC: 227 PG/ML
WBC # BLD AUTO: 7.69 K/UL
WBC # BLD AUTO: 8.73 K/UL
WBC #/AREA URNS AUTO: 23 /HPF (ref 0–5)

## 2017-05-23 PROCEDURE — 82962 GLUCOSE BLOOD TEST: CPT

## 2017-05-23 PROCEDURE — G8978 MOBILITY CURRENT STATUS: HCPCS | Mod: CK

## 2017-05-23 PROCEDURE — G8987 SELF CARE CURRENT STATUS: HCPCS | Mod: CJ

## 2017-05-23 PROCEDURE — G0378 HOSPITAL OBSERVATION PER HR: HCPCS

## 2017-05-23 PROCEDURE — 84100 ASSAY OF PHOSPHORUS: CPT

## 2017-05-23 PROCEDURE — G8979 MOBILITY GOAL STATUS: HCPCS | Mod: CK

## 2017-05-23 PROCEDURE — 25000003 PHARM REV CODE 250: Performed by: STUDENT IN AN ORGANIZED HEALTH CARE EDUCATION/TRAINING PROGRAM

## 2017-05-23 PROCEDURE — G8980 MOBILITY D/C STATUS: HCPCS | Mod: CK

## 2017-05-23 PROCEDURE — 86592 SYPHILIS TEST NON-TREP QUAL: CPT

## 2017-05-23 PROCEDURE — 99226 PR SUBSEQUENT OBSERVATION CARE,LEVEL III: CPT | Mod: GC,,, | Performed by: HOSPITALIST

## 2017-05-23 PROCEDURE — 85610 PROTHROMBIN TIME: CPT

## 2017-05-23 PROCEDURE — 86580 TB INTRADERMAL TEST: CPT | Performed by: STUDENT IN AN ORGANIZED HEALTH CARE EDUCATION/TRAINING PROGRAM

## 2017-05-23 PROCEDURE — 82550 ASSAY OF CK (CPK): CPT

## 2017-05-23 PROCEDURE — 83735 ASSAY OF MAGNESIUM: CPT | Mod: 91

## 2017-05-23 PROCEDURE — 82140 ASSAY OF AMMONIA: CPT

## 2017-05-23 PROCEDURE — 80053 COMPREHEN METABOLIC PANEL: CPT

## 2017-05-23 PROCEDURE — 97165 OT EVAL LOW COMPLEX 30 MIN: CPT

## 2017-05-23 PROCEDURE — 25000003 PHARM REV CODE 250: Performed by: EMERGENCY MEDICINE

## 2017-05-23 PROCEDURE — G8988 SELF CARE GOAL STATUS: HCPCS | Mod: CI

## 2017-05-23 PROCEDURE — 82746 ASSAY OF FOLIC ACID SERUM: CPT

## 2017-05-23 PROCEDURE — 82306 VITAMIN D 25 HYDROXY: CPT

## 2017-05-23 PROCEDURE — 63600175 PHARM REV CODE 636 W HCPCS

## 2017-05-23 PROCEDURE — 84443 ASSAY THYROID STIM HORMONE: CPT

## 2017-05-23 PROCEDURE — 84439 ASSAY OF FREE THYROXINE: CPT

## 2017-05-23 PROCEDURE — 81001 URINALYSIS AUTO W/SCOPE: CPT

## 2017-05-23 PROCEDURE — 83036 HEMOGLOBIN GLYCOSYLATED A1C: CPT

## 2017-05-23 PROCEDURE — 63600175 PHARM REV CODE 636 W HCPCS: Performed by: STUDENT IN AN ORGANIZED HEALTH CARE EDUCATION/TRAINING PROGRAM

## 2017-05-23 PROCEDURE — 83735 ASSAY OF MAGNESIUM: CPT

## 2017-05-23 PROCEDURE — 97161 PT EVAL LOW COMPLEX 20 MIN: CPT

## 2017-05-23 PROCEDURE — 82607 VITAMIN B-12: CPT

## 2017-05-23 PROCEDURE — 83605 ASSAY OF LACTIC ACID: CPT

## 2017-05-23 PROCEDURE — 80053 COMPREHEN METABOLIC PANEL: CPT | Mod: 91

## 2017-05-23 PROCEDURE — 80307 DRUG TEST PRSMV CHEM ANLYZR: CPT

## 2017-05-23 PROCEDURE — 85025 COMPLETE CBC W/AUTO DIFF WBC: CPT

## 2017-05-23 PROCEDURE — 84425 ASSAY OF VITAMIN B-1: CPT

## 2017-05-23 PROCEDURE — 36415 COLL VENOUS BLD VENIPUNCTURE: CPT

## 2017-05-23 PROCEDURE — 25000003 PHARM REV CODE 250

## 2017-05-23 PROCEDURE — 97110 THERAPEUTIC EXERCISES: CPT

## 2017-05-23 RX ORDER — AMOXICILLIN 250 MG
1 CAPSULE ORAL 2 TIMES DAILY
COMMUNITY
Start: 2017-05-23

## 2017-05-23 RX ORDER — LISINOPRIL 20 MG/1
40 TABLET ORAL DAILY
Status: DISCONTINUED | OUTPATIENT
Start: 2017-05-23 | End: 2017-05-26 | Stop reason: HOSPADM

## 2017-05-23 RX ORDER — CETIRIZINE HYDROCHLORIDE 10 MG/1
10 TABLET ORAL DAILY
Status: ON HOLD | COMMUNITY
End: 2017-05-25 | Stop reason: HOSPADM

## 2017-05-23 RX ORDER — OXYBUTYNIN CHLORIDE 15 MG/1
5 TABLET, EXTENDED RELEASE ORAL DAILY
Status: ON HOLD | COMMUNITY
End: 2017-05-25 | Stop reason: HOSPADM

## 2017-05-23 RX ORDER — TRAMADOL HYDROCHLORIDE 50 MG/1
50 TABLET ORAL EVERY 6 HOURS PRN
Status: ON HOLD | COMMUNITY
End: 2017-05-25 | Stop reason: HOSPADM

## 2017-05-23 RX ORDER — DICYCLOMINE HYDROCHLORIDE 10 MG/1
10 CAPSULE ORAL 3 TIMES DAILY
Status: ON HOLD | COMMUNITY
End: 2017-05-25 | Stop reason: HOSPADM

## 2017-05-23 RX ORDER — LISINOPRIL 20 MG/1
40 TABLET ORAL DAILY
Status: DISCONTINUED | OUTPATIENT
Start: 2017-05-23 | End: 2017-05-23

## 2017-05-23 RX ORDER — CHOLECALCIFEROL (VITAMIN D3) 25 MCG
2000 TABLET ORAL DAILY
Status: DISCONTINUED | OUTPATIENT
Start: 2017-05-24 | End: 2017-05-26 | Stop reason: HOSPADM

## 2017-05-23 RX ADMIN — ASPIRIN 81 MG: 81 TABLET, COATED ORAL at 10:05

## 2017-05-23 RX ADMIN — Medication 1 TABLET: at 07:05

## 2017-05-23 RX ADMIN — POLYETHYLENE GLYCOL 3350 17 G: 17 POWDER, FOR SOLUTION ORAL at 10:05

## 2017-05-23 RX ADMIN — STANDARDIZED SENNA CONCENTRATE AND DOCUSATE SODIUM 1 TABLET: 8.6; 5 TABLET, FILM COATED ORAL at 10:05

## 2017-05-23 RX ADMIN — Medication 5 UNITS: at 03:05

## 2017-05-23 RX ADMIN — METOPROLOL SUCCINATE 25 MG: 25 TABLET, EXTENDED RELEASE ORAL at 10:05

## 2017-05-23 RX ADMIN — INSULIN ASPART 2 UNITS: 100 INJECTION, SOLUTION INTRAVENOUS; SUBCUTANEOUS at 08:05

## 2017-05-23 RX ADMIN — INSULIN ASPART 3 UNITS: 100 INJECTION, SOLUTION INTRAVENOUS; SUBCUTANEOUS at 12:05

## 2017-05-23 RX ADMIN — ACETAMINOPHEN 650 MG: 325 TABLET ORAL at 11:05

## 2017-05-23 RX ADMIN — AMLODIPINE BESYLATE 5 MG: 5 TABLET ORAL at 10:05

## 2017-05-23 RX ADMIN — CEFTRIAXONE 1 G: 1 INJECTION, SOLUTION INTRAVENOUS at 11:05

## 2017-05-23 RX ADMIN — PRAVASTATIN SODIUM 40 MG: 40 TABLET ORAL at 10:05

## 2017-05-23 RX ADMIN — LEVOTHYROXINE SODIUM 50 MCG: 50 TABLET ORAL at 05:05

## 2017-05-23 RX ADMIN — LISINOPRIL 40 MG: 20 TABLET ORAL at 11:05

## 2017-05-23 NOTE — PLAN OF CARE
Problem: Physical Therapy Goal  Goal: Physical Therapy Goal  Goals to be met by: 2017     Patient will increase functional independence with mobility by performin. Supine to sit with Set-up [place]  2. Sit to supine with Set-up [place]  3. Sit to stand transfer with Supervision  4. Bed to chair transfer with Supervision using Single-point Cane or RW  5. Gait  x 100 feet with Supervision using Single-point Cane or RW.   6. Lower extremity exercise program x15 reps per handout, with supervision    Outcome: Ongoing (interventions implemented as appropriate)  Goals set

## 2017-05-23 NOTE — PLAN OF CARE
"Arash Espinoza, DO   [address] / [place] [address]       [id number] - [place] [place] - [address]  [address]  [place] [address]  Phone: [phone] Fax: [phone]    Payor: [employer] MEDICARE / Plan: Gift Card Impressions CHOICES 65 / Product Type: Medicare Advantage /      The patient / family member was given the Patient Information and Education packet, the "My Health Packet".         05/23/17 6395   Discharge Assessment   Assessment Type Discharge Planning Assessment   Confirmed/corrected address and phone number on facesheet? Yes   Assessment information obtained from? Patient   Expected Length of Stay (days) 1   Communicated expected length of stay with patient/caregiver yes   Prior to hospitilization cognitive status: Alert/Oriented   Prior to hospitalization functional status: Assistive Equipment   Current cognitive status: Alert/Oriented   Current Functional Status: Assistive Equipment   Arrived From home or self-care   Lives With alone   Able to Return to Prior Arrangements unable to determine at this time (comments)   Is patient able to care for self after discharge? Unable to determine at this time (comments)   Who are your caregiver(s) and their phone number(s)? Ron Nicholas (son) [phone]   Patient's perception of discharge disposition skilled nursing facility   Readmission Within The Last 30 Days no previous admission in last 30 days   Patient currently being followed by outpatient case management? No   Patient currently receives home health services? No   Does the patient currently use HME? Yes   Patient currently receives private duty nursing? No   Patient currently receives any other outside agency services? No   Equipment Currently Used at Home cane, straight   Do you have any problems affording any of your prescribed medications? No   Is the patient taking medications as prescribed? yes   Do you have any financial concerns preventing you from " receiving the healthcare you need? No   Does the patient have transportation to healthcare appointments? Yes   Transportation Available family or friend will provide;car   On Dialysis? No   Does the patient receive services at the [facility]? No   Discharge Plan A Skilled Nursing Facility   Discharge Plan B Home Health   Patient/Family In Agreement With Plan yes

## 2017-05-23 NOTE — PROGRESS NOTES
Progress Note  Hospital Medicine                                                              Team: [facility] [place] [facility]    Patient Name: Carin Nicholas  YOB: 1929    Admit Date: 5/22/2017                     LOS: 0    SUBJECTIVE:     Reason for Admission:  VIOLETA    History of Present Illness:  Ms. Carin Nicholas is a 87 y.o. female (prefers Belgian although may speak English) with HTN, T2DM with neuropathy/CKD, HLD, RA/OA, hypothyroidism, aortic atherosclerosis, chronic left hip pain, obesity, ?opiate dependence, and urge incontinence who presented to the ED via EMS as patient was found down in her apartment. Patient is a poor historian and appears somewhat confused, only oriented to self. States she has had worsening back, left hip, and abdominal pain for the past few months which is what brought her in. When asked which medicine she takes for pain, she states Tylenol but does not know all of her medications and continues to tell us to look in her cabinet for them as she thinks she is home. Patient states last BM 3-4 days ago.   States she has not taken her medicine for the past 3 days although when asked why, she tells different stories (make her feel dizzy vs make her cough vs unable to fill prescription). She does admit to decreased appetite.      Per patient's son (lives in [place]), he spoke with the  who stated pt was found outside her door confused. There were no reports of a fall to the son. Son states he last spoke with her Saturday (3 days prior) and she appeared to be oriented although he did note slurring of speech. He is unsure of her primary care doctor as patient also seen at [facility], however, per chart review, patient has also followed with Dr. Arash Espinoza.      Spoke with patient's pharmacist at [facility], who states has noticed steady decline in patient's mental status. Patient calling multiple times not remembering previously spoke with pharmacist. Also noted patient  mixing her medications. Noted patient appears more weak and fatigued when she does present to pharmacy.     Hospital course:  5/23: VIOLETA and AMS resolved. Lisinopril restarted for BP control.     Interval history:   Patient appears much improved this AM. Oriented to self and place, but not year. More lucid. Denies abdominal pain or back pain. Able to state she remembers events from yesterday and admits to falling because her lower extremities felt weak. She states they still feel weak but are much improved from yesterday. Denies fever, chills, nausea, vomiting. Has not had a BM in a few days but able to state this is normal for her.     OBJECTIVE:     Vital Signs Range (Last 24H):  Temp:  [96.1 °F (35.6 °C)-98.4 °F (36.9 °C)]   Pulse:  []   Resp:  [14-38]   BP: (128-199)/()   SpO2:  [93 %-99 %] Body mass index is 30.21 kg/m².  Wt Readings from Last 1 Encounters:   05/22/17 2108 70.2 kg (154 lb 11.2 oz)   05/22/17 1000 78 kg (172 lb)       I & O (Last 24H):No intake or output data in the 24 hours ending 05/23/17 0753    Physical Exam:  Constitutional: She appears well-developed and well-nourished. No distress.   HENT:   Head: Normocephalic and atraumatic.   Eyes: Conjunctivae and EOM are normal. Pupils are equal, round, and reactive to light.   Neck: Normal range of motion. Neck supple.   Cardiovascular: Normal rate, regular rhythm, normal heart sounds and intact distal pulses.   Pulmonary/Chest: Breath sounds normal. No respiratory distress. She has no wheezes. She has no rhonchi. She has no rales.   Abdominal: Soft. She exhibits no distension. Nontender, nondistended.  Musculoskeletal: moves extremities spontaneously   Neurological: She is oriented to self and place. Following commands.   Skin: Skin is warm and dry. No rash noted.     Diagnostic Results:  Lab Results   Component Value Date    WBC 11.16 05/22/2017    HGB 13.0 05/22/2017    HCT 39.1 05/22/2017    MCV 86 05/22/2017     05/22/2017        Recent Labs  Lab 05/22/17  1456   *      K 3.9      CO2 21*   BUN 50*   CREATININE 1.5*   CALCIUM 9.6     No results found for: INR, PROTIME  Lab Results   Component Value Date    HGBA1C 9.1 (H) 09/26/2016     Recent Labs      05/22/17   1259   POCTGLUCOSE  274*       ASSESSMENT/PLAN:     Current Problems List:  Active Hospital Problems    Diagnosis  POA    Left hip pain [M25.552]  Yes      Resolved Hospital Problems    Diagnosis Date Resolved POA   No resolved problems to display.       Active Problems, Status & Treatment Plan Addressed Today:  Metabolic encephalopathy   - sepsis vs CVA vs dementia vs delirium vs medication effect  - no leukocytosis or recorded fever, however, can not r/o infectious process   - lactate wnl  - repeat UA unremarkable  - urine cx, bcxs  - CT head negative  - TSH wnl  - B12, folate wnl, thiamine pending  - it is possible patient with dementia as appears to have a progressive decline and increase in forgetfulness as all other work up negative. Also patient seen s/p 1x dose tramadol, as this may be a medication side effect or combination of both.    - improved this AM     VIOLETA   - likely pre-renal given patient's decrease in po intake   - baseline Cr 0.8 (1.5 on admission), 0.9 today   - urine studies consistent with pre-renal etiology   - resolved     HTN   - norvasc 5 mg daily   - Toprol 25 mg daily  - VIOLETA resolved, will restart lisinopril 40 mg daily     HLD  - continue pravastatin 40 mg daily      T2DM   - SSI      Hypothyroidism   - TSH wnl  - levothyroxine 50 mcg daily      Arthritis   - tylenol prn for pain   - will avoid opioids given previous mental status changes      ?hx of falls   - Xrays negative for fracture or dislocation   - PT/OT      Constipation  - will start with miralax daily, senna doc bid   - will consider adding lactulose      Urge incontinence  - holding home oxybutynin and mirabegron in setting of possible AMS     DVT ppx: Adonis  SCDs  Diet: Diabetic 1800   Dispo: pending PT/OT evaluation     Case discussed with Dr. Waite. Staff attestation to follow.     Signing Physician:  Stacy Velásquez MD

## 2017-05-23 NOTE — PT/OT/SLP EVAL
Physical Therapy  Evaluation    Carin Nicholas   MRN: [id number]   Admitting Diagnosis: VIOLETA (acute kidney injury)    PT Received On: 05/23/17  PT Start Time: 1128     PT Stop Time: 1151    PT Total Time (min): 23 min       Billable Minutes:  Evaluation 23    Diagnosis: VIOLETA (acute kidney injury)      Past Medical History:   Diagnosis Date    Arthritis     Diabetes mellitus, type 2     Hyperlipidemia     Hypertension     Hypothyroidism     Rheumatoid arthritis       Past Surgical History:   Procedure Laterality Date    APPENDECTOMY      HYSTERECTOMY         Referring physician: Stacy Velásquez MD  Date referred to PT: 5/23/2017    General Precautions: Standard, fall  Orthopedic Precautions: N/A   Braces:              Patient History:  Lives With: alone  Living Arrangements: apartment  Living Environment Comment: Pt. states that her neighbors can assist her as needed. Need to verify support group.  Equipment Currently Used at Home: cane, straight    Previous Level of Function:  Ambulation Skills: needs device (SC)  Transfer Skills: independent  ADL Skills: independent  Work/Leisure Activity: independent    Subjective:  Communicated with nursing prior to session.    Chief Complaint: weakness  Patient goals: to go home    Pain Rating:  (pt. did not rate)                    Objective:   Patient found with: bed alarm, peripheral IV     Cognitive Exam:  Oriented to: Person, Place and Time    Follows Commands/attention: Follows one-step commands  Communication: clear/fluent  Safety awareness/insight to disability: slightly impaired    Physical Exam:  Postural examination/scapula alignment: Rounded shoulder and flexed posture    Skin integrity: Visible skin intact  Edema: None noted     Sensation:   Intact    Upper Extremity Range of Motion:  Right Upper Extremity: WFL except limited shoulder flexion  Left Upper Extremity: WFL except limited shoulder flexion    Upper Extremity Strength:  Right Upper Extremity:  WFL  Left Upper Extremity: WFL    Lower Extremity Range of Motion:  Right Lower Extremity: WFL  Left Lower Extremity: WFL    Lower Extremity Strength:  Right Lower Extremity: WFL  Left Lower Extremity: WFL     Fine motor coordination:  Intact    Gross motor coordination: WFL    Functional Mobility:  Bed Mobility:  Rolling/Turning to Left: Minimum assistance  Scooting/Bridging: Contact Guard Assistance, Minimum Assistance  Supine to Sit: Minimum Assistance  Sit to Supine: Moderate Assistance, Maximum Assistance, With leg lift    Transfers:  Sit <> Stand Assistance: Minimum Assistance  Sit <> Stand Assistive Device: Rolling Walker  Bed <> Chair Technique: Stand Pivot  Bed <> Chair Assistance: Minimum Assistance  Bed <> Chair Assistive Device: Rolling Walker    Gait:   Gait Distance: 15'  Assistance 1: Minimum assistance  Gait Assistive Device: Rolling walker  Gait Pattern: 3-point gait  Gait Deviation(s): decreased lissa, decreased stride length, decreased toe-to-floor clearance, decreased weight-shifting ability    Stairs:      Balance:   Static Sit: FAIR+: Able to take MINIMAL challenges from all directions  Dynamic Sit: FAIR+: Maintains balance through MINIMAL excursions of active trunk motion  Static Stand: FAIR+: Takes MINIMAL challenges from all directions  Dynamic stand: FAIR(-): Needs CONTACT GUARD-Min A during gait    Therapeutic Activities and Exercises:  Discussed need for further PT and POC.    AM-PAC 6 CLICK MOBILITY  How much help from another person does this patient currently need?   1 = Unable, Total/Dependent Assistance  2 = A lot, Maximum/Moderate Assistance  3 = A little, Minimum/Contact Guard/Supervision  4 = None, Modified [place]/Independent    Turning over in bed (including adjusting bedclothes, sheets and blankets)?: 3  Sitting down on and standing up from a chair with arms (e.g., wheelchair, bedside commode, etc.): 3  Moving from lying on back to sitting on the side of the bed?:  3  Moving to and from a bed to a chair (including a wheelchair)?: 3  Need to walk in hospital room?: 3  Climbing 3-5 steps with a railing?: 2  Total Score: 17     AM-PAC Raw Score CMS G-Code Modifier Level of Impairment Assistance   6 % Total / Unable   7 - 9 CM 80 - 100% Maximal Assist   10 - 14 CL 60 - 80% Moderate Assist   15 - 19 CK 40 - 60% Moderate Assist   20 - 22 CJ 20 - 40% Minimal Assist   23 CI 1-20% SBA / CGA   24 CH 0% Independent/ Mod I     Patient left supine with all lines intact, call button in reach and bed alarm on.    Assessment:   Carin Nicholas is a 87 y.o. female with a medical diagnosis of VIOLETA (acute kidney injury) and presents with debility/deconditioning. Pt. cooperative and tolerated treatment fairly well except for fatigues quickly with amb. Pt. would benefit from continued PT to increase strength/endurance and improve functional mobility.    Rehab identified problem list/impairments: Rehab identified problem list/impairments: weakness, impaired endurance, impaired self care skills, impaired functional mobilty, gait instability, impaired balance, pain, decreased upper extremity function    Rehab potential is fair.    Activity tolerance: Fair    Discharge recommendations: Discharge Facility/Level Of Care Needs: nursing facility, skilled     Barriers to discharge: Barriers to Discharge: Decreased caregiver support    Equipment recommendations: Equipment Needed After Discharge: shower chair, walker, rolling     GOALS:    Physical Therapy Goals        Problem: Physical Therapy Goal    Goal Priority Disciplines Outcome Goal Variances Interventions   Physical Therapy Goal     PT/OT, PT Ongoing (interventions implemented as appropriate)     Description:  Goals to be met by: 2017     Patient will increase functional independence with mobility by performin. Supine to sit with Set-up Scott  2. Sit to supine with Set-up Scott  3. Sit to stand transfer with  Supervision  4. Bed to chair transfer with Supervision using Single-point Cane or RW  5. Gait  x 100 feet with Supervision using Single-point Cane or RW.   6. Lower extremity exercise program x15 reps per handout, with supervision                      PLAN:    Patient to be seen 5 x/week to address the above listed problems via gait training, therapeutic activities, therapeutic exercises  Plan of Care expires: 06/22/17  Plan of Care reviewed with: patient    Functional Assessment Tool Used: AM-PAC  Score: 17  Functional Limitation: Mobility: Walking and moving around  Mobility: Walking and Moving Around Current Status (): ÁNGEL  Mobility: Walking and Moving Around Goal Status (): ÁNGEL Villaseñor, PT  05/23/2017

## 2017-05-23 NOTE — ED NOTES
Spoke with son Ron, patient spoke with son as well. Son states cell phone should be in patient's apartment. Personal belongings bag with clothes, shoes, wallet, and keys to be transported with patient to obs.

## 2017-05-23 NOTE — MEDICAL/APP STUDENT
[facility] Hospital Medicine  Progress Note    Patient Name: Carin Nicholas  MRN: [id number]  Patient Class: OP- Observation   Admission Date: 5/22/2017  Length of Stay: 0 days  Attending Physician: Tommy Terry MD  Primary Care Provider: Arash Espinoza DO    [facility] Medicine Team: [facility] 2 Rosie Braxton    Subjective:     Principal Problem:VIOLETA (acute kidney injury)    HPI: Ms. Carin Nicholas is a 87 y.o. female born in [place] with HTN, T2DM with neuropathy/CKD, HLD, RA/OA, hypothyroidism, chronic left hip pain, obesity, and urge incontinence who presented to the ED via EMS as patient was found down in her apartment. Patient is a poor historian and appears somewhat confused, only oriented to self. States she has had worsening back, left hip, and abdominal pain for the past few months which is what brought her in. When asked which medicine she takes for pain, she states Tylenol but does not know all of her medications and continues to tell us to look in her cabinet for them as she thinks she is home. Patient states last BM 3-4 days ago.   States she has not taken her medicine for the past 3 days although when asked why, she tells different stories (make her feel dizzy vs make her cough vs unable to fill prescription). She does admit to decreased appetite.      Per patient's son (lives in [place]), he spoke with the  who stated pt was found outside her door confused. There were no reports of a fall to the son. Son states he last spoke with her Saturday (3 days prior) and she appeared to be oriented although he did note slurring of speech. He is unsure of her primary care doctor as patient also seen at [facility], however, per chart review, patient has also followed with Dr. Arash Espinoza.      Spoke with patient's pharmacist at [facility], who states has noticed steady decline in patient's mental status. Patient calling multiple times not remembering previously  spoke with pharmacist. Also noted patient mixing her medications. Noted patient appears more weak and fatigued when she does present to pharmacy.      Hospital course:  5/23: VIOLETA and AMS resolved. Lisinopril restarted for BP control.      Interval history:   Patient today was alert and oriented to self and location. She appears much improved this AM although still a bit confused. Denies abdominal pain or back pain but complained of abdo pain on rounds. Able to state she remembers events from yesterday and admits to falling because her lower extremities felt weak. She states they still feel weak but are much improved from yesterday. Denies fever, chills, nausea, vomiting. Has not had a BM in a few days but able to state this is normal for her. Pt said she had been sick 2 weeks ago w/URI.    Review of Systems   Constitutional: Positive for appetite change. Negative for activity change, chills, diaphoresis and fever.   Respiratory: Positive for cough. Negative for choking, chest tightness and shortness of breath.    Gastrointestinal: Positive for abdominal pain. Negative for abdominal distention.   Endocrine: Negative.    Genitourinary: Negative for enuresis, flank pain, frequency and genital sores.   Musculoskeletal: Negative.    Skin: Negative for color change, pallor, rash and wound.   Neurological: Negative for dizziness, facial asymmetry, light-headedness and headaches.   Hematological: Negative for adenopathy. Does not bruise/bleed easily.   Psychiatric/Behavioral: Negative.      Objective:     Vital Signs (Most Recent):  Temp: 97.1 °F (36.2 °C) (05/23/17 1200)  Pulse: 74 (05/23/17 1200)  Resp: 18 (05/23/17 1200)  BP: (!) 182/86 (05/23/17 1200)  SpO2: 95 % (05/23/17 1200) Vital Signs (24h Range):  Temp:  [96.1 °F (35.6 °C)-97.7 °F (36.5 °C)] 97.1 °F (36.2 °C)  Pulse:  [] 74  Resp:  [14-18] 18  SpO2:  [95 %-99 %] 95 %  BP: (128-199)/(63-89) 182/86     Weight: 70.2 kg (154 lb 11.2 oz)  Body mass index is  30.21 kg/m².    Intake/Output Summary (Last 24 hours) at 05/23/17 1455  Last data filed at 05/23/17 1200   Gross per 24 hour   Intake              560 ml   Output                0 ml   Net              560 ml      Physical Exam    Significant Labs:   Blood Culture:   Recent Labs  Lab 05/22/17  1931 05/22/17  2306   LABBLOO No Growth to date No Growth to date     BMP:   Recent Labs  Lab 05/23/17  0716   *   *   K 3.5      CO2 24   BUN 45*   CREATININE 0.9   CALCIUM 9.2   MG 2.1     CBC:   Recent Labs  Lab 05/22/17  1456 05/23/17  0716   WBC 11.16 8.73   HGB 13.0 13.0   HCT 39.1 40.6    232     CMP:   Recent Labs  Lab 05/22/17  1456 05/23/17  0716    135*   K 3.9 3.5    102   CO2 21* 24   * 199*   BUN 50* 45*   CREATININE 1.5* 0.9   CALCIUM 9.6 9.2   PROT 7.0 6.6   ALBUMIN 3.8 3.5   BILITOT 0.5 0.4   ALKPHOS 87 94   AST 44* 44*   ALT 20 21   ANIONGAP 15 9   EGFRNONAA 31.1* 57.7*     POCT Glucose:   Recent Labs  Lab 05/23/17  0812 05/23/17  1221 05/23/17  1559   POCTGLUCOSE 209* 260* 186*     TSH:   Recent Labs  Lab 05/23/17  0716   TSH 2.829     Urine Culture: No results for input(s): LABURIN in the last 48 hours.  Urine Studies:   Recent Labs  Lab 05/22/17 1925   COLORU Yellow   APPEARANCEUA Clear   PHUR 5.0   SPECGRAV 1.020   PROTEINUA Negative   GLUCUA 1+*   KETONESU 1+*   BILIRUBINUA Negative   OCCULTUA 1+*   NITRITE Negative   UROBILINOGEN Negative   LEUKOCYTESUR Negative   RBCUA 1   WBCUA 0   BACTERIA Rare   SQUAMEPITHEL 0   HYALINECASTS 5*       Significant Imaging: CT: I have reviewed all pertinent results/findings within the past 24 hours and my personal findings are:  mild vascular changes consistent with pt age, no hemorrhage visible    Assessment/Plan:      Active Diagnoses:    Diagnosis Date Noted POA    PRINCIPAL PROBLEM:  VIOLETA (acute kidney injury) [N17.9] 05/23/2017 Unknown    Encephalopathy, metabolic [G93.41] 05/23/2017 Unknown    Left hip pain  [M25.552] 05/22/2017 Yes    Urge incontinence [N39.41] 11/09/2016 Yes    Essential hypertension [I10] 09/26/2016 Yes     Chronic    Type 2 diabetes mellitus with diabetic chronic kidney disease [E11.22] 11/24/2015 Yes     Chronic    Hyperlipidemia [E78.5] 11/19/2014 Yes     Chronic    Hypothyroidism [E03.9] 11/19/2014 Yes     Chronic      Problems Resolved During this Admission:    Diagnosis Date Noted Date Resolved POA   Metabolic encephalopathy   - sepsis vs CVA vs dementia vs delirium vs medication effect  - no leukocytosis or recorded fever, however, can not r/o infectious process   - lactate, TSH, B12 and folate all wnl  - repeat UA unremarkable  - urine cx, bcxs- no growth as of today  - CT head negative  - thiamine pending  - it is possible patient with dementia as appears to have a progressive decline and increase in forgetfulness as all other work up negative. Also patient seen s/p 1x dose tramadol, as this may be a medication side effect or combination of both.    - improved this AM     VIOLETA   - likely pre-renal given patient's decrease in po intake   - baseline Cr 0.8 (1.5 on admission), 0.9 today   - urine studies consistent with pre-renal etiology   - resolved     HTN   - norvasc 5 mg daily   - Toprol 25 mg daily  - VIOLETA resolved, will restart lisinopril 40 mg daily   VTE Risk Mitigation         Ordered     Place sequential compression device  Until discontinued      05/22/17 1942     Medium Risk of VTE  Once      05/22/17 1907     Place BENITEZ hose  Until discontinued      05/22/17 1907          Rosie Braxton  Department of Hospital Medicine   [facility]

## 2017-05-23 NOTE — PT/OT/SLP EVAL
"Occupational Therapy  Evaluation    Carin Nicholas   MRN: [id number]   Admitting Diagnosis: VIOLETA (acute kidney injury)    OT Date of Treatment: 17   OT Start Time: 910  OT Stop Time: 942  OT Total Time (min): 32 min    Billable Minutes:  Evaluation 20  Therapeutic Exercise 12    Diagnosis: VIOLETA (acute kidney injury)       Past Medical History:   Diagnosis Date    Arthritis     Diabetes mellitus, type 2     Hyperlipidemia     Hypertension     Hypothyroidism     Rheumatoid arthritis       Past Surgical History:   Procedure Laterality Date    APPENDECTOMY      HYSTERECTOMY         Referring physician: Didier  Date referred to OT: 17    General Precautions: Standard, fall  Orthopedic Precautions: N/A  Braces: N/A          Patient History:  Living Environment  Lives With: alone  Living Arrangements: apartment  Transportation Available: family or friend will provide  Living Environment Comment:  (Pt lives alone in apt - "has great neighbors" that will assist as needed - PLOF with ADLs and mobility using cane sometimes - walk in shower with grab bars)  Equipment Currently Used at Home: cane, straight, grab bar    Prior level of function:   Bed Mobility/Transfers: independent  Grooming: independent  Bathing: independent  Upper Body Dressing: independent  Lower Body Dressing: independent  Toileting: independent  Home Management Skills: independent        Dominant hand: right    Subjective:  Communicated with nurse prior to session.  "I used to have a nice house, but then I sold it.  Now I live in apartments with great people."  Chief Complaint: pain  Patient/Family stated goals: return home    Pain Ratin/10     Objective:  Patient found with: bed alarm, peripheral IV (knee high BENITEZ)    Cognitive Exam:  Oriented to: Person, Place and able to state year but not month  Follows Commands/attention: Easily distracted and Follows two-step commands  Communication: clear/fluent  Memory:  No Deficits noted  Safety " awareness/insight to disability: impaired - pt noted with some confusion  Coping skills/emotional control: Labile and patient tearful when discussing past marriages and living alone    Visual/perceptual:  Wears glasses for acuity - perception WFL    Physical Exam:  Postural examination/scapula alignment: Rounded shoulder and Head forward  Skin integrity: Visible skin intact  Edema: None noted     Sensation:   Intact    Upper Extremity Range of Motion:  Right Upper Extremity: WFL except shoulders - AROM up to ~90 degrees - slight increase passively but with pain  Left Upper Extremity: WFL except shoulders - AROM to ~90 degrees with slight increase passively but with pain    Upper Extremity Strength:  Right Upper Extremity: WFL except shoulder  Left Upper Extremity: WFL except shoulder   Strength: fair    Fine motor coordination:   Intact    Gross motor coordination: WFL - pt functional with ADLs regardless of limited shoulder flex    Functional Mobility:  Bed Mobility:  Rolling/Turning to Left: Supervision  Rolling/Turning Right: Supervision  Scooting/Bridging: Supervision  Supine to Sit: Supervision  Sit to Supine: Supervision    Transfers:  Sit <> Stand Assistance: Contact Guard Assistance  Sit <> Stand Assistive Device: Rolling Walker  Toilet Transfer Technique: Stand Pivot  Toilet Transfer Assistance: Minimum Assistance  Toilet Transfer Assistive Device: Rolling Walker, bedside commode    Functional Ambulation: Pt able to stand and take side steps L to reposition prior to returning to bed without AD - pt took some steps using RW to transfer to C - both performed with min assist    Activities of Daily Living:  Feeding Level of Assistance: Independent    UE Dressing Level of Assistance: Minimum assistance ([facility] gown as robe - min assist due to IV line)  }  LE Dressing Level of Assistance: Minimum assistance (donned/doffed socks sitting EOB - needed assist to adjust socks)    Grooming Position:  "EOB  Grooming Level of Assistance: Supervision (washed hands/face with set up assist)     Toileting Level of Assistance: Activity did not occur     Bathing Level of Assistance: Activity did not occur      Balance:   Static Sit: GOOD-: Takes MODERATE challenges from all directions but inconsistently  Dynamic Sit: GOOD-: Maintains balance through MODERATE excursions of active trunk movement,    - pt with posterior lean while donning socks and MMT but able to correct  Static Stand: FAIR: Maintains without assist but unable to take challenges  Dynamic stand: FAIR: Needs CONTACT GUARD during gait    Therapeutic Activities and Exercises:  · Pt completed ADLs and func mobility as noted above  · Pt completed 1 set of 15 reps of B UE AROM exercises in order to work towards increasing her UB strength/endurance to assist with mobility and self care skills.  Pt completed the following exercises: shoulder flex/ext, elbow flex/ext, forearm sup/pro, and hand pumps.   · Pt educated on role and POC  · Pt reminded to call for assistance before getting OOB    AM-PAC 6 CLICK ADL  How much help from another person does this patient currently need?  1 = Unable, Total/Dependent Assistance  2 = A lot, Maximum/Moderate Assistance  3 = A little, Minimum/Contact Guard/Supervision  4 = None, Modified [place]/Independent    Putting on and taking off regular lower body clothing? : 3  Bathing (including washing, rinsing, drying)?: 3  Toileting, which includes using toilet, bedpan, or urinal? : 3  Putting on and taking off regular upper body clothing?: 3  Taking care of personal grooming such as brushing teeth?: 4  Eating meals?: 4  Total Score: 20    AM-PAC Raw Score CMS "G-Code Modifier Level of Impairment Assistance   6 % Total / Unable   7 - 9 CM 80 - 100% Maximal Assist   10 - 14 CL 60 - 80% Moderate Assist   15 - 19 CK 40 - 60% Moderate Assist   20 - 22 CJ 20 - 40% Minimal Assist   23 CI 1-20% SBA / CGA   24 CH 0% Independent/ " Mod I       Patient left HOB elevated with all lines intact, call button in reach and bed alarm on    Assessment:  Carin Nihcolas is a 87 y.o. female with a medical diagnosis of VIOLETA (acute kidney injury) and presents with decreased strength/endurance, confusion, decreased mobility, and decreased self care.  Pt was agreeable to OT evaluation.  Pt noted with some confusion at time of evaluation and required increased time with tasks, along with, min assist.  Goals established to assist pt with returning to PLOF regarding ADLs and func mobility.  Pt will benefit from skilled OT services in order to increase his level of safety and independence with ADLs and mobility.  At this time, OT recommends SNF for pt's post acute therapy needs.  DME recommendations include RW and shower chair for safe mobility.  .    Rehab identified problem list/impairments: Rehab identified problem list/impairments: weakness, impaired endurance, impaired self care skills, impaired functional mobilty, gait instability, impaired balance, impaired cognition, decreased coordination, decreased upper extremity function, decreased lower extremity function, decreased ROM, decreased safety awareness    Rehab potential is good.    Activity tolerance: Fair    Discharge recommendations: SNF - OT initially recommended HHOT but after conversations with PT regarding pt's ambulatory status (significant decline), recommendations changed to SNF     Barriers to discharge: Barriers to Discharge: Decreased caregiver support    Equipment recommendations: walker, rolling, shower chair     GOALS:    Occupational Therapy Goals        Problem: Occupational Therapy Goal    Goal Priority Disciplines Outcome Interventions   Occupational Therapy Goal     OT, PT/OT Ongoing (interventions implemented as appropriate)    Description:  Goals to be met by: 05/30/17     Patient will increase functional independence with ADLs by performing:    UE Dressing with [place].  LE  Dressing with Modified [place].  Grooming while standing with Modified [place].  Toileting from toilet with Modified [place] for hygiene and clothing management.   Toilet transfer to toilet with Modified [place].  Upper extremity exercise program x15 reps per handout, with supervision.                      PLAN:  Patient to be seen 4 x/week to address the above listed problems via self-care/home management, therapeutic activities, therapeutic exercises  Plan of Care expires: 06/22/17  Plan of Care reviewed with: patient         Haylee Strong, OT  05/23/2017

## 2017-05-23 NOTE — PLAN OF CARE
Problem: Occupational Therapy Goal  Goal: Occupational Therapy Goal  Goals to be met by: 05/30/17     Patient will increase functional independence with ADLs by performing:    UE Dressing with [place].  LE Dressing with Modified [place].  Grooming while standing with Modified [place].  Toileting from toilet with Modified [place] for hygiene and clothing management.   Toilet transfer to toilet with Modified [place].  Upper extremity exercise program x15 reps per handout, with supervision.    Outcome: Ongoing (interventions implemented as appropriate)    Pt was agreeable to OT evaluation.  Goals established to assist pt with returning to OF regarding ADLs and func mobility.  Pt will benefit from skilled OT services in order to increase his level of safety and independence with ADLs and mobility.      Haylee Strong, OT  5/23/2017

## 2017-05-23 NOTE — PLAN OF CARE
[facility]    HOME HEALTH ORDERS  FACE TO FACE ENCOUNTER    Patient Name: Carin Nicholas  YOB: 1929    PCP: Arash Espinoza DO   PCP Address: [address] / [place] [address]  PCP Phone Number: [phone]  PCP Fax: [phone]    Encounter Date: 05/23/2017    Admit to Home Health    Diagnoses:  Active Hospital Problems    Diagnosis  POA    *VIOLETA (acute kidney injury) [N17.9]  Unknown    Encephalopathy, metabolic [G93.41]  Unknown    Left hip pain [M25.552]  Yes    Urge incontinence [N39.41]  Yes    Essential hypertension [I10]  Yes     Chronic    Type 2 diabetes mellitus with diabetic chronic kidney disease [E11.22]  Yes     Chronic    Hyperlipidemia [E78.5]  Yes     Chronic    Hypothyroidism [E03.9]  Yes     Chronic      Resolved Hospital Problems    Diagnosis Date Resolved POA   No resolved problems to display.       No future appointments.  Follow-up Information     Arash Espinoza DO. Call in 3 days.    Specialty:  Internal Medicine  Contact information:  [address]  [place] [address]  [phone]                     I have seen and examined this patient face to face today. My clinical findings that support the need for the home health skilled services and home bound status are the following:  Weakness/numbness causing balance and gait disturbance due to Weakness/Debility making it taxing to leave home.  Patient with medication mismanagement issues requiring home bound status as evidenced by  Uncontrolled hyperglycemic/hypoglycemic events.  Mental confusion making it unsafe for patient to leave home alone due to  Dementia.    Allergies:Review of patient's allergies indicates:  No Known Allergies    Diet: diabetic diet: 1800 calorie    Activities: As tolerated. No driving until follow up with PCP.    Nursing:   SN to complete comprehensive assessment including routine vital signs. Instruct on disease process and s/s of  complications to report to MD. Review/verify medication list sent home with the patient at time of discharge  and instruct patient/caregiver as needed. Frequency may be adjusted depending on start of care date.    Notify MD if SBP > 160 or < 90; DBP > 90 or < 50; HR > 120 or < 50; Temp > 101      CONSULTS:    Physical Therapy to evaluate and treat. Evaluate for home safety and equipment needs; Establish/upgrade home exercise program. Perform / instruct on therapeutic exercises, gait training, transfer training, and Range of Motion.  Occupational Therapy to evaluate and treat. Evaluate home environment for safety and equipment needs. Perform/Instruct on transfers, ADL training, ROM, and therapeutic exercises.  Aide to provide assistance with personal care, ADLs, and vital signs.    MISCELLANEOUS CARE:  Diabetic Care:   SN to perform and educate Diabetic management with blood glucose monitoring: and Report CBG < 60 or > 350 to physician.    WOUND CARE ORDERS  n/a      Medications: Review discharge medications with patient and family and provide education.      Current Discharge Medication List      START taking these medications    Details   senna-docusate 8.6-50 mg (PERICOLACE) 8.6-50 mg per tablet Take 1 tablet by mouth 2 (two) times daily.         CONTINUE these medications which have NOT CHANGED    Details   amlodipine (NORVASC) 5 MG tablet Take 1 tablet (5 mg total) by mouth once daily.  Qty: 90 tablet, Refills: 1      aspirin (ECOTRIN) 81 MG EC tablet Take 81 mg by mouth once daily.      fluticasone (FLONASE) 50 mcg/actuation nasal spray INSTILL ONE SPRAY IN EACH NOSTRIL ONCE DAILY  Qty: 16 g, Refills: 1      ibuprofen (ADVIL,MOTRIN) 600 MG tablet Take 1 tablet (600 mg total) by mouth every 6 (six) hours as needed for Pain.  Qty: 30 tablet, Refills: 5    Associated Diagnoses: Pain of left hip joint      insulin aspart protamine-insulin aspart (NOVOLOG 70/30) 100 unit/mL (70-30) InPn pen Inject 16 units into skin  "with breakfast and 7 units with dinner  Qty: 1 Box, Refills: 6    Associated Diagnoses: Type 2 diabetes mellitus with diabetic polyneuropathy      levothyroxine (SYNTHROID) 50 MCG tablet Take 1 tablet (50 mcg total) by mouth before breakfast.  Qty: 90 tablet, Refills: 1      lisinopril (PRINIVIL,ZESTRIL) 40 MG tablet TAKE ONE TABLET BY MOUTH EVERY DAY  Qty: 90 tablet, Refills: 3      metoprolol succinate (TOPROL-XL) 25 MG 24 hr tablet Take 1 tablet (25 mg total) by mouth once daily.  Qty: 90 tablet, Refills: 0      mirabegron (MYRBETRIQ) 25 mg Tb24 ER tablet Take 1 tablet (25 mg total) by mouth once daily.  Qty: 90 tablet, Refills: 4    Associated Diagnoses: Urge incontinence; Urinary urgency      pen needle, diabetic (BD INSULIN PEN NEEDLE UF MINI) 31 gauge x 3/16" Ndle 1 each by Misc.(Non-Drug; Combo Route) route every morning. To use twice daily with Novolog 70/30  Qty: 100 each, Refills: 11    Associated Diagnoses: Type 2 diabetes mellitus with diabetic polyneuropathy      pravastatin (PRAVACHOL) 40 MG tablet Take 1 tablet (40 mg total) by mouth once daily.  Qty: 90 tablet, Refills: 0      PREMARIN vaginal cream Use vaginally Every other day      PROCTOSOL HC 2.5 % rectal cream          STOP taking these medications       cetirizine (ZYRTEC) 10 MG tablet Comments:   Reason for Stopping:         ciprofloxacin HCl (CIPRO) 500 MG tablet Comments:   Reason for Stopping:         dicyclomine (BENTYL) 10 MG capsule Comments:   Reason for Stopping:         docusate sodium (COLACE) 50 MG capsule Comments:   Reason for Stopping:         tramadol (ULTRAM) 50 mg tablet Comments:   Reason for Stopping:           Please tell patient to stop taking oxybutynin (Ditropan) as well as this may cause increased confusion.     I certify that this patient is confined to her home and needs intermittent skilled nursing care, physical therapy and occupational therapy.    Stacy Velásquez MD, PGY1            "

## 2017-05-23 NOTE — NURSING
Off unit via stretcher to ct scan.    0215  Back from ct scan via stretcher.  No change in assessment.

## 2017-05-24 PROBLEM — M25.562 BILATERAL CHRONIC KNEE PAIN: Status: ACTIVE | Noted: 2017-05-24

## 2017-05-24 PROBLEM — G89.29 BILATERAL CHRONIC KNEE PAIN: Status: ACTIVE | Noted: 2017-05-24

## 2017-05-24 PROBLEM — M25.561 BILATERAL CHRONIC KNEE PAIN: Status: ACTIVE | Noted: 2017-05-24

## 2017-05-24 LAB
ALBUMIN SERPL BCP-MCNC: 3.2 G/DL
ALP SERPL-CCNC: 79 U/L
ALT SERPL W/O P-5'-P-CCNC: 20 U/L
ANION GAP SERPL CALC-SCNC: 10 MMOL/L
AST SERPL-CCNC: 34 U/L
BACTERIA UR CULT: NO GROWTH
BASOPHILS # BLD AUTO: 0.02 K/UL
BASOPHILS NFR BLD: 0.3 %
BILIRUB SERPL-MCNC: 0.5 MG/DL
BUN SERPL-MCNC: 26 MG/DL
CALCIUM SERPL-MCNC: 9.1 MG/DL
CHLORIDE SERPL-SCNC: 106 MMOL/L
CO2 SERPL-SCNC: 23 MMOL/L
CREAT SERPL-MCNC: 0.7 MG/DL
DIFFERENTIAL METHOD: NORMAL
EOSINOPHIL # BLD AUTO: 0.2 K/UL
EOSINOPHIL NFR BLD: 2.2 %
ERYTHROCYTE [DISTWIDTH] IN BLOOD BY AUTOMATED COUNT: 13.8 %
EST. GFR  (AFRICAN AMERICAN): >60 ML/MIN/1.73 M^2
EST. GFR  (NON AFRICAN AMERICAN): >60 ML/MIN/1.73 M^2
GLUCOSE SERPL-MCNC: 169 MG/DL
HCT VFR BLD AUTO: 38.5 %
HGB BLD-MCNC: 12.9 G/DL
LYMPHOCYTES # BLD AUTO: 1.9 K/UL
LYMPHOCYTES NFR BLD: 25.7 %
MAGNESIUM SERPL-MCNC: 1.7 MG/DL
MCH RBC QN AUTO: 28.9 PG
MCHC RBC AUTO-ENTMCNC: 33.5 %
MCV RBC AUTO: 86 FL
MONOCYTES # BLD AUTO: 0.6 K/UL
MONOCYTES NFR BLD: 8.1 %
NEUTROPHILS # BLD AUTO: 4.6 K/UL
NEUTROPHILS NFR BLD: 63.4 %
PHOSPHATE SERPL-MCNC: 2.4 MG/DL
PLATELET # BLD AUTO: 237 K/UL
PMV BLD AUTO: 10.5 FL
POCT GLUCOSE: 179 MG/DL (ref 70–110)
POCT GLUCOSE: 206 MG/DL (ref 70–110)
POCT GLUCOSE: 211 MG/DL (ref 70–110)
POTASSIUM SERPL-SCNC: 3.6 MMOL/L
PROT SERPL-MCNC: 6.1 G/DL
RBC # BLD AUTO: 4.46 M/UL
RPR SER QL: NORMAL
SODIUM SERPL-SCNC: 139 MMOL/L
WBC # BLD AUTO: 7.24 K/UL

## 2017-05-24 PROCEDURE — 63600175 PHARM REV CODE 636 W HCPCS: Performed by: STUDENT IN AN ORGANIZED HEALTH CARE EDUCATION/TRAINING PROGRAM

## 2017-05-24 PROCEDURE — 80053 COMPREHEN METABOLIC PANEL: CPT

## 2017-05-24 PROCEDURE — 11000001 HC ACUTE MED/SURG PRIVATE ROOM

## 2017-05-24 PROCEDURE — 84100 ASSAY OF PHOSPHORUS: CPT

## 2017-05-24 PROCEDURE — 25000003 PHARM REV CODE 250: Performed by: STUDENT IN AN ORGANIZED HEALTH CARE EDUCATION/TRAINING PROGRAM

## 2017-05-24 PROCEDURE — 99232 SBSQ HOSP IP/OBS MODERATE 35: CPT | Mod: ,,, | Performed by: HOSPITALIST

## 2017-05-24 PROCEDURE — 25000003 PHARM REV CODE 250: Performed by: EMERGENCY MEDICINE

## 2017-05-24 PROCEDURE — 25000003 PHARM REV CODE 250

## 2017-05-24 PROCEDURE — 85025 COMPLETE CBC W/AUTO DIFF WBC: CPT

## 2017-05-24 PROCEDURE — 36415 COLL VENOUS BLD VENIPUNCTURE: CPT

## 2017-05-24 PROCEDURE — 83735 ASSAY OF MAGNESIUM: CPT

## 2017-05-24 RX ORDER — ENOXAPARIN SODIUM 100 MG/ML
40 INJECTION SUBCUTANEOUS EVERY 24 HOURS
Status: DISCONTINUED | OUTPATIENT
Start: 2017-05-24 | End: 2017-05-26 | Stop reason: HOSPADM

## 2017-05-24 RX ORDER — AMLODIPINE BESYLATE 10 MG/1
10 TABLET ORAL DAILY
Status: DISCONTINUED | OUTPATIENT
Start: 2017-05-25 | End: 2017-05-26 | Stop reason: HOSPADM

## 2017-05-24 RX ORDER — METOPROLOL SUCCINATE 50 MG/1
50 TABLET, EXTENDED RELEASE ORAL DAILY
Status: DISCONTINUED | OUTPATIENT
Start: 2017-05-25 | End: 2017-05-26

## 2017-05-24 RX ORDER — SODIUM,POTASSIUM PHOSPHATES 280-250MG
2 POWDER IN PACKET (EA) ORAL ONCE
Status: COMPLETED | OUTPATIENT
Start: 2017-05-24 | End: 2017-05-24

## 2017-05-24 RX ORDER — HYDRALAZINE HYDROCHLORIDE 25 MG/1
25 TABLET, FILM COATED ORAL EVERY 8 HOURS PRN
Status: DISCONTINUED | OUTPATIENT
Start: 2017-05-24 | End: 2017-05-26 | Stop reason: HOSPADM

## 2017-05-24 RX ORDER — OLANZAPINE 10 MG/2ML
5 INJECTION, POWDER, FOR SOLUTION INTRAMUSCULAR ONCE AS NEEDED
Status: COMPLETED | OUTPATIENT
Start: 2017-05-24 | End: 2017-05-24

## 2017-05-24 RX ORDER — HYDRALAZINE HYDROCHLORIDE 25 MG/1
25 TABLET, FILM COATED ORAL ONCE
Status: COMPLETED | OUTPATIENT
Start: 2017-05-24 | End: 2017-05-24

## 2017-05-24 RX ADMIN — STANDARDIZED SENNA CONCENTRATE AND DOCUSATE SODIUM 1 TABLET: 8.6; 5 TABLET, FILM COATED ORAL at 09:05

## 2017-05-24 RX ADMIN — VITAMIN D, TAB 1000IU (100/BT) 2000 UNITS: 25 TAB at 09:05

## 2017-05-24 RX ADMIN — ENOXAPARIN SODIUM 40 MG: 100 INJECTION SUBCUTANEOUS at 05:05

## 2017-05-24 RX ADMIN — LISINOPRIL 40 MG: 20 TABLET ORAL at 09:05

## 2017-05-24 RX ADMIN — ACETAMINOPHEN 650 MG: 325 TABLET ORAL at 01:05

## 2017-05-24 RX ADMIN — LEVOTHYROXINE SODIUM 50 MCG: 50 TABLET ORAL at 06:05

## 2017-05-24 RX ADMIN — AMLODIPINE BESYLATE 5 MG: 5 TABLET ORAL at 09:05

## 2017-05-24 RX ADMIN — HYDRALAZINE HYDROCHLORIDE 25 MG: 50 TABLET ORAL at 09:05

## 2017-05-24 RX ADMIN — OLANZAPINE 5 MG: 10 INJECTION, POWDER, LYOPHILIZED, FOR SOLUTION INTRAMUSCULAR at 05:05

## 2017-05-24 RX ADMIN — INSULIN ASPART 2 UNITS: 100 INJECTION, SOLUTION INTRAVENOUS; SUBCUTANEOUS at 05:05

## 2017-05-24 RX ADMIN — POLYETHYLENE GLYCOL 3350 17 G: 17 POWDER, FOR SOLUTION ORAL at 09:05

## 2017-05-24 RX ADMIN — PRAVASTATIN SODIUM 40 MG: 40 TABLET ORAL at 09:05

## 2017-05-24 RX ADMIN — ASPIRIN 81 MG: 81 TABLET, COATED ORAL at 09:05

## 2017-05-24 RX ADMIN — METOPROLOL SUCCINATE 25 MG: 25 TABLET, EXTENDED RELEASE ORAL at 09:05

## 2017-05-24 RX ADMIN — POTASSIUM & SODIUM PHOSPHATES POWDER PACK 280-160-250 MG 2 PACKET: 280-160-250 PACK at 09:05

## 2017-05-24 RX ADMIN — STANDARDIZED SENNA CONCENTRATE AND DOCUSATE SODIUM 1 TABLET: 8.6; 5 TABLET, FILM COATED ORAL at 10:05

## 2017-05-24 RX ADMIN — Medication 1 TABLET: at 09:05

## 2017-05-24 NOTE — PROGRESS NOTES
Progress Note  Hospital Medicine                                                              [place] [facility]    Patient Name: Carin Nicholas  YOB: 1929    Admit Date: 5/22/2017                     LOS: 0    SUBJECTIVE:     Reason for Admission:  VIOLETA    History of Present Illness:  Ms. Carin Nicholas is a 87 y.o. female (prefers [place] although may speak English) with HTN, T2DM with neuropathy/CKD, HLD, RA/OA, hypothyroidism, aortic atherosclerosis, chronic left hip pain, obesity, ?opiate dependence, and urge incontinence who presented to the ED via EMS as patient was found down in her apartment. Patient is a poor historian and appears somewhat confused, only oriented to self. States she has had worsening back, left hip, and abdominal pain for the past few months which is what brought her in. When asked which medicine she takes for pain, she states Tylenol but does not know all of her medications and continues to tell us to look in her cabinet for them as she thinks she is home. Patient states last BM 3-4 days ago.   States she has not taken her medicine for the past 3 days although when asked why, she tells different stories (make her feel dizzy vs make her cough vs unable to fill prescription). She does admit to decreased appetite.      Per patient's son (lives in [place]), he spoke with the  who stated pt was found outside her door confused. There were no reports of a fall to the son. Son states he last spoke with her Saturday (3 days prior) and she appeared to be oriented although he did note slurring of speech. He is unsure of her primary care doctor as patient also seen at [facility], however, per chart review, patient has also followed with Dr. Arash Espinoza.      Spoke with patient's pharmacist at [facility], who states has noticed steady decline in patient's mental status. Patient calling multiple times not remembering previously spoke with pharmacist. Also noted patient  mixing her medications. Noted patient appears more weak and fatigued when she does present to pharmacy.     Hospital course:  5/23: VIOLETA and AMS resolved. Lisinopril restarted for BP control.   5/24: Started on ceftriaxone for dysuria and repeat UA revealing leuks. Will treat    Interval history:   Overnight, patient agitated and pulled IV. Delirium workup notable for low Vit D, repeat UA revealing leuks, and given sx (dysuria) and delirium, will treat with ceftriaxone.     OBJECTIVE:     Vital Signs Range (Last 24H):  Temp:  [96.9 °F (36.1 °C)-98.8 °F (37.1 °C)]   Pulse:  [74-91]   Resp:  [16-18]   BP: (145-182)/(70-86)   SpO2:  [94 %-97 %] Body mass index is 30.21 kg/m².  Wt Readings from Last 1 Encounters:   05/22/17 2108 70.2 kg (154 lb 11.2 oz)   05/22/17 1000 78 kg (172 lb)       I & O (Last 24H):    Intake/Output Summary (Last 24 hours) at 05/24/17 0731  Last data filed at 05/24/17 0600   Gross per 24 hour   Intake              560 ml   Output              200 ml   Net              360 ml       Physical Exam:  Constitutional: She appears well-developed and well-nourished. No distress.   HENT:   Head: Normocephalic and atraumatic.   Eyes: Conjunctivae and EOM are normal. Pupils are equal, round, and reactive to light.   Neck: Normal range of motion. Neck supple.   Cardiovascular: Normal rate, regular rhythm, normal heart sounds and intact distal pulses.   Pulmonary/Chest: Breath sounds normal. No respiratory distress. She has no wheezes. She has no rhonchi. She has no rales.   Abdominal: Soft. She exhibits no distension. Nontender, nondistended.  Musculoskeletal: moves extremities spontaneously   Neurological: She is oriented to self and place. Following commands.   Skin: Skin is warm and dry. No rash noted.     Diagnostic Results:  Lab Results   Component Value Date    WBC 7.24 05/24/2017    HGB 12.9 05/24/2017    HCT 38.5 05/24/2017    MCV 86 05/24/2017     05/24/2017       Recent Labs  Lab  05/24/17  0541   *      K 3.6      CO2 23   BUN 26*   CREATININE 0.7   CALCIUM 9.1   MG 1.7     Lab Results   Component Value Date    INR 1.0 05/23/2017     Lab Results   Component Value Date    HGBA1C 8.9 (H) 05/23/2017     Recent Labs      05/22/17   1259  05/23/17   0812  05/23/17   1221  05/23/17   1559  05/23/17   2044   POCTGLUCOSE  274*  209*  260*  186*  190*       ASSESSMENT/PLAN:     Current Problems List:  Active Hospital Problems    Diagnosis  POA    *VIOLETA (acute kidney injury) [N17.9]  Unknown    Encephalopathy, metabolic [G93.41]  Unknown    Vitamin D insufficiency [E55.9]  Yes    Left hip pain [M25.552]  Yes    Urge incontinence [N39.41]  Yes    Essential hypertension [I10]  Yes     Chronic    Type 2 diabetes mellitus with diabetic chronic kidney disease [E11.22]  Yes     Chronic    Hyperlipidemia [E78.5]  Yes     Chronic    Hypothyroidism [E03.9]  Yes     Chronic      Resolved Hospital Problems    Diagnosis Date Resolved POA   No resolved problems to display.       Active Problems, Status & Treatment Plan Addressed Today:  Metabolic encephalopathy   - sepsis vs CVA vs dementia vs delirium vs medication effect vs UTI  - no leukocytosis or recorded fever, however, can not r/o infectious process   - lactate wnl  - repeat UA unremarkable; however, repeated after episode of dysuria, +leuks  - urine cx, bcxs - NGTD  - CT head negative  - TSH wnl  - B12, folate wnl, thiamine pending  - it is possible patient with dementia as appears to have a progressive decline and increase in forgetfulness as all other work up negative. Also patient seen s/p 1x dose tramadol, as this may be a medication side effect or combination of both.    -      VIOLETA   - likely pre-renal given patient's decrease in po intake   - baseline Cr 0.8 (1.5 on admission), 0.9 today   - urine studies consistent with pre-renal etiology   - resolved    UTI  -patient complained of dysuria, repeat UA dirty  -ceftriaxone 1g  q24     HTN   - norvasc 5 mg daily   - Toprol 25 mg daily  - VIOLETA resolved, will restart lisinopril 40 mg daily     HLD  - continue pravastatin 40 mg daily      T2DM   - SSI      Hypothyroidism   - TSH wnl  - levothyroxine 50 mcg daily      Arthritis   - tylenol prn for pain   - will avoid opioids given previous mental status changes      ?hx of falls   - Xrays negative for fracture or dislocation   - PT/OT      Constipation  - will start with miralax daily, senna doc bid   - will consider adding lactulose      Urge incontinence  - holding home oxybutynin and mirabegron in setting of possible AMS     DVT ppx: lovenox  Diet: Diabetic 1800   Dispo: Will send out referrals to SNF-[facility]     Case discussed with Dr. Waite. Staff attestation to follow.     Signing Physician:  CHRIS Bautista

## 2017-05-24 NOTE — MEDICAL/APP STUDENT
[facility] Hospital Medicine  Progress Note    Patient Name: Carin Nicholas  MRN: [id number]  Patient Class: IP- Inpatient   Admission Date: 5/22/2017  Length of Stay: 0 days  Attending Physician: Joyce Waite MD  Primary Care Provider: Arash Espinoza DO    [facility] Medicine Team: [facility] MED 2 Rosie Braxton    Subjective:     Principal Problem:VIOLETA (acute kidney injury)    HPI: Ms. Carin Nicholas is a 87 y.o. female born in [place] with HTN, T2DM with neuropathy/CKD, HLD, RA/OA, hypothyroidism, chronic left hip pain, obesity, and urge incontinence who presented to the ED via EMS as patient was found down in her apartment. Patient is a poor historian and appears somewhat confused, only oriented to self. States she has had worsening back, left hip, and abdominal pain for the past few months which is what brought her in. When asked which medicine she takes for pain, she states Tylenol but does not know all of her medications and continues to tell us to look in her cabinet for them as she thinks she is home. Patient states last BM 3-4 days ago.   States she has not taken her medicine for the past 3 days although when asked why, she tells different stories (make her feel dizzy vs make her cough vs unable to fill prescription). She does admit to decreased appetite.      Per patient's son (lives in [place]), he spoke with the  who stated pt was found outside her door confused. There were no reports of a fall to the son. Son states he last spoke with her Saturday (3 days prior) and she appeared to be oriented although he did note slurring of speech. He is unsure of her primary care doctor as patient also seen at [facility], however, per chart review, patient has also followed with Dr. Arash Espinoza.      Spoke with patient's pharmacist at [facility], who states has noticed steady decline in patient's mental status. Patient calling multiple times not remembering previously spoke  with pharmacist. Also noted patient mixing her medications. Noted patient appears more weak and fatigued when she does present to pharmacy.      Hospital course:  5/23: VIOLETA and AMS resolved. Lisinopril restarted for BP control.   5/24: More confused today, CPK elevated most likely from muscle breakdown after likely fall. Starting treatment for UTI.      Interval history:   Patient today was oriented to self and location only. She appears confused this AM asking about her son who lives with her in her apartment.  Denies abdominal pain or back pain but complained of bladder pain overnight to nursing guero. Unable to remember events that brought her to the hospital or yesterday's events.  Has not had a BM.       Review of Systems   Constitutional: Negative for activity change, appetite change, chills, diaphoresis, fatigue, fever and unexpected weight change.   HENT: Negative for congestion, dental problem, drooling, ear pain, hearing loss, sneezing, sore throat, trouble swallowing and voice change.    Eyes: Negative.    Respiratory: Negative for apnea, cough, choking, chest tightness, shortness of breath, wheezing and stridor.    Cardiovascular: Negative for chest pain, palpitations and leg swelling.   Gastrointestinal: Negative for abdominal distention, abdominal pain, anal bleeding, blood in stool, diarrhea and nausea.   Endocrine: Negative.    Genitourinary: Positive for dysuria. Negative for difficulty urinating and dyspareunia.   Musculoskeletal: Negative for myalgias, neck pain and neck stiffness.   Skin: Negative for color change, pallor, rash and wound.   Neurological: Negative for tremors, seizures, syncope, speech difficulty and weakness.   Hematological: Negative for adenopathy. Does not bruise/bleed easily.     Objective:     Vital Signs (Most Recent):  Temp: 98.8 °F (37.1 °C) (05/24/17 0800)  Pulse: 80 (05/24/17 0800)  Resp: 16 (05/24/17 0800)  BP: (!) 149/65 (05/24/17 0800)  SpO2: 95 % (05/24/17 0800)  Vital Signs (24h Range):  Temp:  [97.1 °F (36.2 °C)-98.8 °F (37.1 °C)] 98.8 °F (37.1 °C)  Pulse:  [80-91] 80  Resp:  [16-18] 16  SpO2:  [94 %-97 %] 95 %  BP: (145-180)/(65-79) 149/65     Weight: 70.2 kg (154 lb 11.2 oz)  Body mass index is 30.21 kg/m².    Intake/Output Summary (Last 24 hours) at 05/24/17 1217  Last data filed at 05/24/17 1200   Gross per 24 hour   Intake                0 ml   Output              650 ml   Net             -650 ml      Physical Exam    Significant Labs:   A1C:   Recent Labs  Lab 05/23/17  0716   HGBA1C 8.9*     BMP:   Recent Labs  Lab 05/24/17  0541   *      K 3.6      CO2 23   BUN 26*   CREATININE 0.7   CALCIUM 9.1   MG 1.7     CBC:   Recent Labs  Lab 05/23/17  0716 05/23/17 1834 05/24/17  0541   WBC 8.73 7.69 7.24   HGB 13.0 13.5 12.9   HCT 40.6 41.5 38.5    262 237     CMP:   Recent Labs  Lab 05/23/17  0716 05/23/17 1834 05/24/17  0541   * 138 139   K 3.5 3.6 3.6    103 106   CO2 24 26 23   * 197* 169*   BUN 45* 34* 26*   CREATININE 0.9 0.9 0.7   CALCIUM 9.2 9.3 9.1   PROT 6.6 7.0 6.1   ALBUMIN 3.5 3.7 3.2*   BILITOT 0.4 0.3 0.5   ALKPHOS 94 100 79   AST 44* 43* 34   ALT 21 22 20   ANIONGAP 9 9 10   EGFRNONAA 57.7* 57.7* >60.0     Urine Culture:   Recent Labs  Lab 05/22/17 1925   LABURIN No growth     Urine Studies:   Recent Labs  Lab 05/22/17 1925 05/23/17 1908   COLORU Yellow Yellow   APPEARANCEUA Clear Hazy*   PHUR 5.0 5.0   SPECGRAV 1.020 1.015   PROTEINUA Negative Negative   GLUCUA 1+* 2+*   KETONESU 1+* Trace*   BILIRUBINUA Negative Negative   OCCULTUA 1+* Negative   NITRITE Negative Negative   UROBILINOGEN Negative Negative   LEUKOCYTESUR Negative 1+*   RBCUA 1 1   WBCUA 0 23*   BACTERIA Rare Moderate*   SQUAMEPITHEL 0 1   HYALINECASTS 5*  --        Significant Imaging: CXR: I have reviewed all pertinent results/findings within the past 24 hours and my personal findings are:  mild cardiomegaly w/ clear lungs, no acute  pathology    Assessment/Plan:      Active Diagnoses:    Diagnosis Date Noted POA    PRINCIPAL PROBLEM:  VIOLETA (acute kidney injury) [N17.9] 05/23/2017 Unknown    Bilateral chronic knee pain [M25.561, M25.562, G89.29] 05/24/2017 Yes    Encephalopathy, metabolic [G93.41] 05/23/2017 Unknown    Vitamin D insufficiency [E55.9] 05/23/2017 Yes    Left hip pain [M25.552] 05/22/2017 Yes    Urge incontinence [N39.41] 11/09/2016 Yes    Essential hypertension [I10] 09/26/2016 Yes     Chronic    Type 2 diabetes mellitus with diabetic chronic kidney disease [E11.22] 11/24/2015 Yes     Chronic    Hyperlipidemia [E78.5] 11/19/2014 Yes     Chronic    Hypothyroidism [E03.9] 11/19/2014 Yes     Chronic      Problems Resolved During this Admission:    Diagnosis Date Noted Date Resolved POA     Metabolic encephalopathy   - potentially UTI induced  - no leukocytosis or recorded fever, however, can not r/o infectious process   - lactate, TSH, B12 and folate all wnl  - it is possible patient with dementia as appears to have a progressive decline and increase in forgetfulness as all other work up negative. Also patient seen s/p 1x dose tramadol, as this may be a medication side effect or combination of both.    - worse this AM    UTI  -repeat UA remarkable for moderate bacteria  -pt has complained of bladder pain  -start on empiric treatment     VIOLETA   - likely pre-renal given patient's decrease in po intake   -Cr today .7-back to baseline  - urine studies consistent with pre-renal etiology   - resolved     HTN   - norvasc 5 mg daily   - Toprol 25 mg daily  - VIOLETA resolved, restarted lisinopril 40 mg daily     VTE Risk Mitigation         Ordered     enoxaparin injection 40 mg  Daily     Route:  Subcutaneous        05/24/17 0730     Place sequential compression device  Until discontinued      05/22/17 1942     Medium Risk of VTE  Once      05/22/17 1907     Place BENITEZ hose  Until discontinued      05/22/17 1907          Rosie Fenton  Irais  Department of [facility] Medicine   [facility]

## 2017-05-24 NOTE — PT/OT/SLP PROGRESS
Occupational Therapy      Carin Nicholas  MRN: [id number]    Patient not seen today secondary to Unavailable (Comment). Pt being transported to 11th floor upon entry into observation unit. Unable to re-attempt this PM 2/2 time restraints. Will follow-up as appropriate per OT POC.    Jennifer Pérez, OTR/L  5/24/2017

## 2017-05-24 NOTE — NURSING
Patient has continued to escalate in her behavior, becoming unmanageable.  Dr Waite notified and evaluated patient.  Zyprexa 5mg IM given and placed in 2 point wrist restraint.  After 30 minutes patient settled down.  She is quiet and subdued.  B/P taken manually 164/84 in left arm.  Dinner has been ordered, arriving in 7 minutes.  CBG was 206 mg/dl.  Received 2 units of novolog for coverage on the sliding scale per MD.

## 2017-05-24 NOTE — NURSING
Patient has had elevated B/P and Dr Waite has been updated about the elevations.   No c/o headache, no diploplia, asymptomatic.  Morning antihypertensives changed.  No treatment at this time.  Resident and Dr. Waite have reviewed and felt it best to wait.  HO B elevated to high fowlers.  Patient is irritable and wants to be left alone.    Was inappropriate earlier, screaming at the top of her lungs.  Confusion is transient. Seen by MSW for skilled nursing home.  Patient lives alone in an apartment.

## 2017-05-24 NOTE — PLAN OF CARE
"Spoke with Ms. Nicholas to discuss discharge planning. Informed patient that PT/OT are recommending SNF at this time. Ms. Nicholas states that she is aware that she needs therapy because " I don't walk like I used to". She would like CM/SW to send referrals to skilled facilities close to her current address. SW informed of above conversation.    Bonnie Quezada RN  Ext [phone]  "

## 2017-05-25 LAB
ALBUMIN SERPL BCP-MCNC: 3.4 G/DL
ALP SERPL-CCNC: 85 U/L
ALT SERPL W/O P-5'-P-CCNC: 21 U/L
ANION GAP SERPL CALC-SCNC: 11 MMOL/L
AST SERPL-CCNC: 30 U/L
BASOPHILS # BLD AUTO: 0.03 K/UL
BASOPHILS NFR BLD: 0.4 %
BILIRUB SERPL-MCNC: 0.4 MG/DL
BUN SERPL-MCNC: 19 MG/DL
CALCIUM SERPL-MCNC: 9 MG/DL
CHLORIDE SERPL-SCNC: 106 MMOL/L
CO2 SERPL-SCNC: 25 MMOL/L
CREAT SERPL-MCNC: 0.8 MG/DL
DIFFERENTIAL METHOD: NORMAL
EOSINOPHIL # BLD AUTO: 0.1 K/UL
EOSINOPHIL NFR BLD: 1.9 %
ERYTHROCYTE [DISTWIDTH] IN BLOOD BY AUTOMATED COUNT: 13.8 %
EST. GFR  (AFRICAN AMERICAN): >60 ML/MIN/1.73 M^2
EST. GFR  (NON AFRICAN AMERICAN): >60 ML/MIN/1.73 M^2
GLUCOSE SERPL-MCNC: 197 MG/DL
HCT VFR BLD AUTO: 40.1 %
HGB BLD-MCNC: 12.9 G/DL
LYMPHOCYTES # BLD AUTO: 2.1 K/UL
LYMPHOCYTES NFR BLD: 30.2 %
MAGNESIUM SERPL-MCNC: 1.8 MG/DL
MCH RBC QN AUTO: 28.2 PG
MCHC RBC AUTO-ENTMCNC: 32.2 %
MCV RBC AUTO: 88 FL
MONOCYTES # BLD AUTO: 0.6 K/UL
MONOCYTES NFR BLD: 8 %
NEUTROPHILS # BLD AUTO: 4.1 K/UL
NEUTROPHILS NFR BLD: 59.2 %
PHOSPHATE SERPL-MCNC: 2.3 MG/DL
PLATELET # BLD AUTO: 246 K/UL
PMV BLD AUTO: 11.5 FL
POCT GLUCOSE: 191 MG/DL (ref 70–110)
POCT GLUCOSE: 247 MG/DL (ref 70–110)
POCT GLUCOSE: 257 MG/DL (ref 70–110)
POCT GLUCOSE: 259 MG/DL (ref 70–110)
POCT GLUCOSE: 261 MG/DL (ref 70–110)
POTASSIUM SERPL-SCNC: 3.5 MMOL/L
PROT SERPL-MCNC: 6.4 G/DL
RBC # BLD AUTO: 4.58 M/UL
SODIUM SERPL-SCNC: 142 MMOL/L
WBC # BLD AUTO: 6.88 K/UL

## 2017-05-25 PROCEDURE — 99233 SBSQ HOSP IP/OBS HIGH 50: CPT | Mod: ,,, | Performed by: HOSPITALIST

## 2017-05-25 PROCEDURE — 63600175 PHARM REV CODE 636 W HCPCS: Performed by: STUDENT IN AN ORGANIZED HEALTH CARE EDUCATION/TRAINING PROGRAM

## 2017-05-25 PROCEDURE — 80053 COMPREHEN METABOLIC PANEL: CPT

## 2017-05-25 PROCEDURE — 83735 ASSAY OF MAGNESIUM: CPT

## 2017-05-25 PROCEDURE — 85025 COMPLETE CBC W/AUTO DIFF WBC: CPT

## 2017-05-25 PROCEDURE — 25000003 PHARM REV CODE 250

## 2017-05-25 PROCEDURE — 36415 COLL VENOUS BLD VENIPUNCTURE: CPT

## 2017-05-25 PROCEDURE — 84100 ASSAY OF PHOSPHORUS: CPT

## 2017-05-25 PROCEDURE — 25000003 PHARM REV CODE 250: Performed by: STUDENT IN AN ORGANIZED HEALTH CARE EDUCATION/TRAINING PROGRAM

## 2017-05-25 PROCEDURE — 11000001 HC ACUTE MED/SURG PRIVATE ROOM

## 2017-05-25 RX ORDER — AMLODIPINE BESYLATE 5 MG/1
10 TABLET ORAL DAILY
Qty: 90 TABLET | Refills: 1 | Status: SHIPPED | OUTPATIENT
Start: 2017-05-25 | End: 2017-08-29 | Stop reason: SDUPTHER

## 2017-05-25 RX ORDER — SODIUM,POTASSIUM PHOSPHATES 280-250MG
2 POWDER IN PACKET (EA) ORAL 2 TIMES DAILY
Status: COMPLETED | OUTPATIENT
Start: 2017-05-25 | End: 2017-05-25

## 2017-05-25 RX ORDER — CHOLECALCIFEROL (VITAMIN D3) 25 MCG
2000 TABLET ORAL DAILY
COMMUNITY
Start: 2017-05-25

## 2017-05-25 RX ORDER — QUETIAPINE FUMARATE 25 MG/1
25 TABLET, FILM COATED ORAL NIGHTLY PRN
Status: DISCONTINUED | OUTPATIENT
Start: 2017-05-25 | End: 2017-05-26 | Stop reason: HOSPADM

## 2017-05-25 RX ORDER — METOPROLOL SUCCINATE 50 MG/1
50 TABLET, EXTENDED RELEASE ORAL DAILY
Qty: 30 TABLET | Refills: 1 | Status: SHIPPED | OUTPATIENT
Start: 2017-05-25 | End: 2017-05-26 | Stop reason: HOSPADM

## 2017-05-25 RX ORDER — LACTULOSE 10 G/15ML
20 SOLUTION ORAL ONCE
Status: COMPLETED | OUTPATIENT
Start: 2017-05-25 | End: 2017-05-25

## 2017-05-25 RX ADMIN — CEFTRIAXONE 1 G: 1 INJECTION, SOLUTION INTRAVENOUS at 10:05

## 2017-05-25 RX ADMIN — LISINOPRIL 40 MG: 20 TABLET ORAL at 09:05

## 2017-05-25 RX ADMIN — INSULIN ASPART 1 UNITS: 100 INJECTION, SOLUTION INTRAVENOUS; SUBCUTANEOUS at 10:05

## 2017-05-25 RX ADMIN — VITAMIN D, TAB 1000IU (100/BT) 2000 UNITS: 25 TAB at 09:05

## 2017-05-25 RX ADMIN — CEFTRIAXONE 1 G: 1 INJECTION, SOLUTION INTRAVENOUS at 12:05

## 2017-05-25 RX ADMIN — POTASSIUM & SODIUM PHOSPHATES POWDER PACK 280-160-250 MG 2 PACKET: 280-160-250 PACK at 09:05

## 2017-05-25 RX ADMIN — PRAVASTATIN SODIUM 40 MG: 40 TABLET ORAL at 09:05

## 2017-05-25 RX ADMIN — ACETAMINOPHEN 650 MG: 325 TABLET ORAL at 10:05

## 2017-05-25 RX ADMIN — Medication 1 TABLET: at 01:05

## 2017-05-25 RX ADMIN — LEVOTHYROXINE SODIUM 50 MCG: 50 TABLET ORAL at 09:05

## 2017-05-25 RX ADMIN — QUETIAPINE FUMARATE 25 MG: 25 TABLET, FILM COATED ORAL at 11:05

## 2017-05-25 RX ADMIN — STANDARDIZED SENNA CONCENTRATE AND DOCUSATE SODIUM 1 TABLET: 8.6; 5 TABLET, FILM COATED ORAL at 09:05

## 2017-05-25 RX ADMIN — LACTULOSE 20 G: 20 SOLUTION ORAL at 01:05

## 2017-05-25 RX ADMIN — METOPROLOL SUCCINATE 50 MG: 50 TABLET, EXTENDED RELEASE ORAL at 09:05

## 2017-05-25 RX ADMIN — HYDRALAZINE HYDROCHLORIDE 25 MG: 25 TABLET, FILM COATED ORAL at 10:05

## 2017-05-25 RX ADMIN — AMLODIPINE BESYLATE 10 MG: 10 TABLET ORAL at 09:05

## 2017-05-25 RX ADMIN — POTASSIUM & SODIUM PHOSPHATES POWDER PACK 280-160-250 MG 2 PACKET: 280-160-250 PACK at 10:05

## 2017-05-25 RX ADMIN — ASPIRIN 81 MG: 81 TABLET, COATED ORAL at 09:05

## 2017-05-25 RX ADMIN — INSULIN ASPART 3 UNITS: 100 INJECTION, SOLUTION INTRAVENOUS; SUBCUTANEOUS at 05:05

## 2017-05-25 RX ADMIN — HYDRALAZINE HYDROCHLORIDE 25 MG: 25 TABLET, FILM COATED ORAL at 01:05

## 2017-05-25 RX ADMIN — INSULIN ASPART 2 UNITS: 100 INJECTION, SOLUTION INTRAVENOUS; SUBCUTANEOUS at 10:05

## 2017-05-25 RX ADMIN — POLYETHYLENE GLYCOL 3350 17 G: 17 POWDER, FOR SOLUTION ORAL at 09:05

## 2017-05-25 RX ADMIN — HYDRALAZINE HYDROCHLORIDE 25 MG: 25 TABLET, FILM COATED ORAL at 05:05

## 2017-05-25 RX ADMIN — ENOXAPARIN SODIUM 40 MG: 100 INJECTION SUBCUTANEOUS at 05:05

## 2017-05-25 NOTE — PT/OT/SLP PROGRESS
Physical Therapy      Carin Nicholas  MRN: [id number]    Patient not seen today secondary to unwilling to participate. Pt educated on the importance of OOB activity in order to improve strength, endurance, and balance. Will follow-up according to PT POC.    Robyn Lopez, PT   5/25/2017

## 2017-05-25 NOTE — PHYSICIAN QUERY
PT Name: Carin Nicholas  MR #: [id number]  Physician Query Form - CKD Clarification     CDS/: Nico Slaughter               Contact information: Ex [phone]  This form is a permanent document in the medical record.     Query Date: May 25, 2017    By submitting this query, we are merely seeking further clarification of documentation. Please utilize your independent clinical judgment when addressing the question(s) below.    The Medical record contains the following:     Indicators   Supporting Clinical Findings   Location in Medical Record   x CKD or Chronic Kidney (Renal) Failure / Disease CKD H/P 5/22   x BUN/Creatinine                          GFR Bun 50..>19    Creatine 1.5..>0.8    GFR 31.1..> >60.0   Las 5/22..>5/25    Dehydration      Nausea / Vomiting      Dialysis / CRRT      Medication      Treatment      Other Chronic Conditions      Other       Provider, please further specify the stage of CKD.      [  ] Chronic Kidney Disease (CKD) (please specify stage* below)       [employer] Definitions  Stage Description  eGFR (mL/min)   [  ]     I Slight kidney damage with normal or increased filtration 90+   [  ]     II Mildly reduced kidney function 60-89   [X  ]     III Moderately reduced kidney function 30-59   [  ]     IV Severely reduced kidney function 15-29   [  ]     V Kidney failure, requiring transplant or dialysis <15     [  ] CKD on Chronic Hemodialysis (please specify stage*): __________  [  ] End Stage Renal Disease (ESRD)  [  ] Other (please specify): ________________________  [  ] Clinically Undetermined    Please document in your progress notes daily for the duration of treatment until resolved and include in your discharge summary.

## 2017-05-25 NOTE — PROGRESS NOTES
Progress Note  Hospital Medicine                                                              Team: [facility] 2    Patient Name: Carin Nicholas  YOB: 1929    Admit Date: 5/22/2017                     LOS: 1    SUBJECTIVE:     Reason for Admission:  VIOLETA    History of Present Illness:  Ms. Carin Nicholas is a 87 y.o. female (prefers Citizen of [place] although may speak English) with HTN, T2DM with neuropathy/CKD, HLD, RA/OA, hypothyroidism, aortic atherosclerosis, chronic left hip pain, obesity, ?opiate dependence, and urge incontinence who presented to the ED via EMS as patient was found down in her apartment. Patient is a poor historian and appears somewhat confused, only oriented to self. States she has had worsening back, left hip, and abdominal pain for the past few months which is what brought her in. When asked which medicine she takes for pain, she states Tylenol but does not know all of her medications and continues to tell us to look in her cabinet for them as she thinks she is home. Patient states last BM 3-4 days ago.   States she has not taken her medicine for the past 3 days although when asked why, she tells different stories (make her feel dizzy vs make her cough vs unable to fill prescription). She does admit to decreased appetite.      Per patient's son (lives in [place]), he spoke with the  who stated pt was found outside her door confused. There were no reports of a fall to the son. Son states he last spoke with her Saturday (3 days prior) and she appeared to be oriented although he did note slurring of speech. He is unsure of her primary care doctor as patient also seen at [facility], however, per chart review, patient has also followed with Dr. Arash Espinoza.      Spoke with patient's pharmacist at [facility], who states has noticed steady decline in patient's mental status. Patient calling multiple times not remembering previously spoke with pharmacist. Also noted patient  mixing her medications. Noted patient appears more weak and fatigued when she does present to pharmacy.     Hospital course:  5/23: VIOLETA and AMS resolved. Lisinopril restarted for BP control.   5/24: Started on ceftriaxone for dysuria and repeat UA revealing leuks. Will treat  5/25: required zyprexa overnight for agitation. Continue with day 3/3 ceftriaxone today. One time dose of lactulose provided for constipation.    Interval history:   Patient agitated overnight requiring zyprexa. Appears confused this morning as she thinks she is at there bedside sitter's house. Oriented to self only. Denies abdominal pain, back pain, arthralgias, dysuria.     OBJECTIVE:     Vital Signs Range (Last 24H):  Temp:  [97.3 °F (36.3 °C)-98.8 °F (37.1 °C)]   Pulse:  []   Resp:  [16-18]   BP: (149-195)/()   SpO2:  [95 %-98 %] Body mass index is 30.21 kg/m².  Wt Readings from Last 1 Encounters:   05/22/17 2108 70.2 kg (154 lb 11.2 oz)   05/22/17 1000 78 kg (172 lb)       I & O (Last 24H):    Intake/Output Summary (Last 24 hours) at 05/25/17 0636  Last data filed at 05/24/17 1200   Gross per 24 hour   Intake                0 ml   Output              450 ml   Net             -450 ml       Physical Exam:  Constitutional: She appears well-developed and well-nourished. No distress.   HENT:   Head: Normocephalic and atraumatic.   Eyes: Conjunctivae and EOM are normal. Pupils are equal, round, and reactive to light.   Neck: Normal range of motion. Neck supple.   Cardiovascular: Normal rate, regular rhythm, intact distal pulses. CHA appreciated.  Pulmonary/Chest: Breath sounds normal. No respiratory distress. She has no wheezes. She has no rhonchi. She has no rales.   Abdominal: Soft. She exhibits no distension. Nondistended. Involuntary guarding to palpation in suprapubic area although denies pain and states her bladder is full   Musculoskeletal: moves extremities spontaneously   Neurological: She is oriented to self only. Following  commands.   Skin: Skin is warm and dry. No rash noted.     Diagnostic Results:  Lab Results   Component Value Date    WBC 6.88 05/25/2017    HGB 12.9 05/25/2017    HCT 40.1 05/25/2017    MCV 88 05/25/2017     05/25/2017       Recent Labs  Lab 05/25/17  0359   *      K 3.5      CO2 25   BUN 19   CREATININE 0.8   CALCIUM 9.0   MG 1.8     Lab Results   Component Value Date    INR 1.0 05/23/2017     Lab Results   Component Value Date    HGBA1C 8.9 (H) 05/23/2017     Recent Labs      05/23/17   0812  05/23/17   1221  05/23/17   1559  05/23/17   2044  05/24/17   0847  05/24/17   1147  05/24/17   1757  05/24/17   2224   POCTGLUCOSE  209*  260*  186*  190*  179*  211*  206*  191*       ASSESSMENT/PLAN:     Current Problems List:  Active Hospital Problems    Diagnosis  POA    *VIOLETA (acute kidney injury) [N17.9]  Unknown    Bilateral chronic knee pain [M25.561, M25.562, G89.29]  Yes    Encephalopathy, metabolic [G93.41]  Unknown    Vitamin D insufficiency [E55.9]  Yes    Left hip pain [M25.552]  Yes    Urge incontinence [N39.41]  Yes    Essential hypertension [I10]  Yes     Chronic    Type 2 diabetes mellitus with diabetic chronic kidney disease [E11.22]  Yes     Chronic    Hyperlipidemia [E78.5]  Yes     Chronic    Hypothyroidism [E03.9]  Yes     Chronic      Resolved Hospital Problems    Diagnosis Date Resolved POA   No resolved problems to display.       Active Problems, Status & Treatment Plan Addressed Today:  Metabolic encephalopathy   - sepsis vs CVA vs dementia vs delirium vs medication effect vs UTI  - no leukocytosis or recorded fever, however, can not r/o infectious process   - lactate wnl  - repeat UA unremarkable; however, repeated after episode of dysuria, +leuks  - urine cx, bcxs - NGTD  - CT head negative  - TSH wnl  - B12, folate wnl, thiamine pending  - it is possible patient with dementia as appears to have a progressive decline and increase in forgetfulness as all other  work up negative. Also patient seen s/p 1x dose tramadol, as this may be a medication side effect or combination of both.    - continue treatment for UTI, day 3/3     VIOLEAT   - likely pre-renal given patient's decrease in po intake   - urine studies consistent with pre-renal etiology   - resolved    UTI  -patient complained of dysuria, repeat UA dirty  -ceftriaxone 1g q24, day 3/3 today      HTN   - norvasc 5 mg daily   - Toprol 25 mg daily  - lisinopril 40 mg daily   - noted hypertension overnight and this morning, likely associated with agitation   - hydralazine 25 mg po prn added    HLD  - continue pravastatin 40 mg daily      T2DM   - SSI      Hypothyroidism   - TSH wnl  - levothyroxine 50 mcg daily      Arthritis   - tylenol prn for pain   - will avoid opioids given previous mental status changes      ?hx of falls   - Xrays negative for fracture or dislocation   - PT/OT      Constipation  - will start with miralax daily, senna doc bid   - lactulose 20 g x1 today as pt remains without BM since admission      Urge incontinence  - holding home oxybutynin and mirabegron in setting of possible AMS     DVT ppx: lovenox  Diet: Diabetic 1800   Dispo: Will send out referrals to SNF-[facility]     Case discussed with Dr. Waite. Staff attestation to follow.     Signing Physician:  Stacy Velásquez MD

## 2017-05-25 NOTE — MEDICAL/APP STUDENT
[facility] Hospital Medicine  Progress Note    Patient Name: Carin Nicholas  MRN: [id number]  Patient Class: IP- Inpatient   Admission Date: 5/22/2017  Length of Stay: 1 days  Attending Physician: Joyce Waite MD  Primary Care Provider: Arash Espinoza [facility] Medicine Team: [facility] Rosie Braxton    Subjective:     Principal Problem:VIOLETA (acute kidney injury)    HPI: ***    Hospital Course: ***    Interval History: ***    Review of Systems  Objective:     Vital Signs (Most Recent):  Temp: 97.4 °F (36.3 °C) (05/25/17 1224)  Pulse: 93 (05/25/17 1224)  Resp: 18 (05/25/17 1224)  BP: 133/81 (05/25/17 1224)  SpO2: 98 % (05/25/17 1224) Vital Signs (24h Range):  Temp:  [97.3 °F (36.3 °C)-98.3 °F (36.8 °C)] 97.4 °F (36.3 °C)  Pulse:  [] 93  Resp:  [16-18] 18  SpO2:  [96 %-98 %] 98 %  BP: (133-218)/() 133/81     Weight: 70.2 kg (154 lb 11.2 oz)  Body mass index is 30.21 kg/m².  No intake or output data in the 24 hours ending 05/25/17 1239   Physical Exam    Significant Labs: {Results:37616}    Significant Imaging: {Imaging Review:42389}    Assessment/Plan:      Active Diagnoses:    Diagnosis Date Noted POA    PRINCIPAL PROBLEM:  VIOLETA (acute kidney injury) [N17.9] 05/23/2017 Unknown    Bilateral chronic knee pain [M25.561, M25.562, G89.29] 05/24/2017 Yes    Encephalopathy, metabolic [G93.41] 05/23/2017 Unknown    Vitamin D insufficiency [E55.9] 05/23/2017 Yes    Left hip pain [M25.552] 05/22/2017 Yes    Urge incontinence [N39.41] 11/09/2016 Yes    Essential hypertension [I10] 09/26/2016 Yes     Chronic    Type 2 diabetes mellitus with diabetic chronic kidney disease [E11.22] 11/24/2015 Yes     Chronic    Hyperlipidemia [E78.5] 11/19/2014 Yes     Chronic    Hypothyroidism [E03.9] 11/19/2014 Yes     Chronic      Problems Resolved During this Admission:    Diagnosis Date Noted Date Resolved POA     VTE Risk Mitigation         Ordered     enoxaparin injection 40  mg  Daily     Route:  Subcutaneous        05/24/17 0730     Place sequential compression device  Until discontinued      05/22/17 1942     Medium Risk of VTE  Once      05/22/17 1907     Place BENITEZ hose  Until discontinued      05/22/17 1907          Rosie Braxton  Department of [facility] Medicine   [facility]

## 2017-05-25 NOTE — PROGRESS NOTES
Spoke to Stacy Velásquez in reference to patient's high blood pressure.   Instructions to Monitor her behavior for agitation.  Will continue to monitor.

## 2017-05-25 NOTE — PLAN OF CARE
Tomeka spoke with Radha with [facility], informed of Pt d/c and barriers. Possible of d/c Tuesday pending medical barriers which include restraints and combativeness.

## 2017-05-25 NOTE — PLAN OF CARE
Met with patient and her son to discuss discharge planning. After discussing all options available, Ms. Nicholas would like to go home with home health at this time. Patient's son is in agreement as well. He will be able to bring his mother home when she is discharged. SW and IM2 team notified of above conversation. Patient is expected to discharge tomorrow.    Bonnie Quezada RN  Ext [phone]

## 2017-05-26 ENCOUNTER — TELEPHONE (OUTPATIENT)
Dept: INTERNAL MEDICINE | Facility: CLINIC | Age: 82
End: 2017-05-26

## 2017-05-26 VITALS
DIASTOLIC BLOOD PRESSURE: 78 MMHG | BODY MASS INDEX: 30.37 KG/M2 | RESPIRATION RATE: 16 BRPM | TEMPERATURE: 98 F | SYSTOLIC BLOOD PRESSURE: 160 MMHG | OXYGEN SATURATION: 100 % | HEIGHT: 60 IN | HEART RATE: 70 BPM | WEIGHT: 154.69 LBS

## 2017-05-26 PROBLEM — N39.0 UTI (URINARY TRACT INFECTION): Status: ACTIVE | Noted: 2017-05-26

## 2017-05-26 PROBLEM — G93.41 ENCEPHALOPATHY, METABOLIC: Status: ACTIVE | Noted: 2017-05-26

## 2017-05-26 PROBLEM — N17.9 AKI (ACUTE KIDNEY INJURY): Status: ACTIVE | Noted: 2017-05-26

## 2017-05-26 LAB
ANION GAP SERPL CALC-SCNC: 9 MMOL/L
BASOPHILS # BLD AUTO: 0.03 K/UL
BASOPHILS NFR BLD: 0.5 %
BUN SERPL-MCNC: 21 MG/DL
CALCIUM SERPL-MCNC: 8.8 MG/DL
CHLORIDE SERPL-SCNC: 107 MMOL/L
CO2 SERPL-SCNC: 25 MMOL/L
CREAT SERPL-MCNC: 0.8 MG/DL
DIFFERENTIAL METHOD: NORMAL
EOSINOPHIL # BLD AUTO: 0.2 K/UL
EOSINOPHIL NFR BLD: 3.1 %
ERYTHROCYTE [DISTWIDTH] IN BLOOD BY AUTOMATED COUNT: 13.9 %
EST. GFR  (AFRICAN AMERICAN): >60 ML/MIN/1.73 M^2
EST. GFR  (NON AFRICAN AMERICAN): >60 ML/MIN/1.73 M^2
GLUCOSE SERPL-MCNC: 187 MG/DL
HCT VFR BLD AUTO: 38 %
HGB BLD-MCNC: 12.4 G/DL
LYMPHOCYTES # BLD AUTO: 2.3 K/UL
LYMPHOCYTES NFR BLD: 34.9 %
MAGNESIUM SERPL-MCNC: 1.7 MG/DL
MCH RBC QN AUTO: 28.5 PG
MCHC RBC AUTO-ENTMCNC: 32.6 %
MCV RBC AUTO: 87 FL
MONOCYTES # BLD AUTO: 0.6 K/UL
MONOCYTES NFR BLD: 9.3 %
NEUTROPHILS # BLD AUTO: 3.3 K/UL
NEUTROPHILS NFR BLD: 51.7 %
PHOSPHATE SERPL-MCNC: 4.1 MG/DL
PLATELET # BLD AUTO: 220 K/UL
PMV BLD AUTO: 10.7 FL
POCT GLUCOSE: 181 MG/DL (ref 70–110)
POCT GLUCOSE: 245 MG/DL (ref 70–110)
POTASSIUM SERPL-SCNC: 3.3 MMOL/L
RBC # BLD AUTO: 4.35 M/UL
SODIUM SERPL-SCNC: 141 MMOL/L
WBC # BLD AUTO: 6.45 K/UL

## 2017-05-26 PROCEDURE — 25000003 PHARM REV CODE 250: Performed by: STUDENT IN AN ORGANIZED HEALTH CARE EDUCATION/TRAINING PROGRAM

## 2017-05-26 PROCEDURE — 84100 ASSAY OF PHOSPHORUS: CPT

## 2017-05-26 PROCEDURE — 85025 COMPLETE CBC W/AUTO DIFF WBC: CPT

## 2017-05-26 PROCEDURE — 63600175 PHARM REV CODE 636 W HCPCS: Performed by: STUDENT IN AN ORGANIZED HEALTH CARE EDUCATION/TRAINING PROGRAM

## 2017-05-26 PROCEDURE — 80048 BASIC METABOLIC PNL TOTAL CA: CPT

## 2017-05-26 PROCEDURE — 83735 ASSAY OF MAGNESIUM: CPT

## 2017-05-26 PROCEDURE — 36415 COLL VENOUS BLD VENIPUNCTURE: CPT

## 2017-05-26 PROCEDURE — 25000003 PHARM REV CODE 250

## 2017-05-26 RX ORDER — MAGNESIUM SULFATE HEPTAHYDRATE 40 MG/ML
2 INJECTION, SOLUTION INTRAVENOUS ONCE
Status: COMPLETED | OUTPATIENT
Start: 2017-05-26 | End: 2017-05-26

## 2017-05-26 RX ORDER — POTASSIUM CHLORIDE 20 MEQ/1
40 TABLET, EXTENDED RELEASE ORAL ONCE
Status: COMPLETED | OUTPATIENT
Start: 2017-05-26 | End: 2017-05-26

## 2017-05-26 RX ORDER — CARVEDILOL 6.25 MG/1
6.25 TABLET ORAL 2 TIMES DAILY
Status: DISCONTINUED | OUTPATIENT
Start: 2017-05-26 | End: 2017-05-26 | Stop reason: HOSPADM

## 2017-05-26 RX ORDER — CARVEDILOL 6.25 MG/1
6.25 TABLET ORAL 2 TIMES DAILY
Qty: 60 TABLET | Refills: 1 | Status: SHIPPED | OUTPATIENT
Start: 2017-05-26 | End: 2017-08-30 | Stop reason: SDUPTHER

## 2017-05-26 RX ADMIN — AMLODIPINE BESYLATE 10 MG: 10 TABLET ORAL at 10:05

## 2017-05-26 RX ADMIN — ASPIRIN 81 MG: 81 TABLET, COATED ORAL at 10:05

## 2017-05-26 RX ADMIN — CARVEDILOL 6.25 MG: 6.25 TABLET, FILM COATED ORAL at 10:05

## 2017-05-26 RX ADMIN — MAGNESIUM SULFATE HEPTAHYDRATE 2 G: 40 INJECTION, SOLUTION INTRAVENOUS at 10:05

## 2017-05-26 RX ADMIN — POTASSIUM CHLORIDE 40 MEQ: 1500 TABLET, EXTENDED RELEASE ORAL at 10:05

## 2017-05-26 RX ADMIN — ACETAMINOPHEN 650 MG: 325 TABLET ORAL at 11:05

## 2017-05-26 RX ADMIN — LISINOPRIL 40 MG: 20 TABLET ORAL at 10:05

## 2017-05-26 RX ADMIN — VITAMIN D, TAB 1000IU (100/BT) 2000 UNITS: 25 TAB at 10:05

## 2017-05-26 RX ADMIN — Medication 1 TABLET: at 10:05

## 2017-05-26 RX ADMIN — LEVOTHYROXINE SODIUM 50 MCG: 50 TABLET ORAL at 10:05

## 2017-05-26 RX ADMIN — PRAVASTATIN SODIUM 40 MG: 40 TABLET ORAL at 10:05

## 2017-05-26 NOTE — PLAN OF CARE
Sw did call [facility] and informed by Naye that Pt is staffed with [facility]. Pt ok to d/c home when son returns.

## 2017-05-26 NOTE — PLAN OF CARE
[facility]    HOME HEALTH ORDERS  FACE TO FACE ENCOUNTER    Patient Name: Carin Nicholas  YOB: 1929    PCP: Arash Espinoza DO   PCP Address: [address] / [place] [address]  PCP Phone Number: [phone]  PCP Fax: [phone]    Encounter Date: 05/26/2017    Admit to Home Health    Diagnoses:  Active Hospital Problems    Diagnosis  POA    *VIOLETA (acute kidney injury) [N17.9]  Unknown    Bilateral chronic knee pain [M25.561, M25.562, G89.29]  Yes    Encephalopathy, metabolic [G93.41]  Unknown    Vitamin D insufficiency [E55.9]  Yes    Left hip pain [M25.552]  Yes    Urge incontinence [N39.41]  Yes    Essential hypertension [I10]  Yes     Chronic    Type 2 diabetes mellitus with diabetic chronic kidney disease [E11.22]  Yes     Chronic    Hyperlipidemia [E78.5]  Yes     Chronic    Hypothyroidism [E03.9]  Yes     Chronic      Resolved Hospital Problems    Diagnosis Date Resolved POA   No resolved problems to display.       Future Appointments  Date Time Provider Department Center   5/31/2017 10:00 AM Arash Espinoza DO [facility]     Follow-up Information     Arash Espinoza DO. Call in 3 days.    Specialty:  Internal Medicine  Contact information:  [address]  [place] [address]  [phone]                     I have seen and examined this patient face to face today. My clinical findings that support the need for the home health skilled services and home bound status are the following:  Weakness/numbness causing balance and gait disturbance due to Weakness/Debility making it taxing to leave home.  Patient with medication mismanagement issues requiring home bound status as evidenced by  Uncontrolled hyperglycemic/hypoglycemic events.  Mental confusion making it unsafe for patient to leave home alone due to  Dementia.    Allergies:Review of patient's allergies indicates:  No Known Allergies    Diet: diabetic diet: 1800  calorie    Activities: As tolerated. No driving until follow up with PCP.    Nursing:   SN to complete comprehensive assessment including routine vital signs. Instruct on disease process and s/s of complications to report to MD. Review/verify medication list sent home with the patient at time of discharge  and instruct patient/caregiver as needed. Frequency may be adjusted depending on start of care date.    Notify MD if SBP > 160 or < 90; DBP > 90 or < 50; HR > 120 or < 50; Temp > 101      CONSULTS:    Physical Therapy to evaluate and treat. Evaluate for home safety and equipment needs; Establish/upgrade home exercise program. Perform / instruct on therapeutic exercises, gait training, transfer training, and Range of Motion.  Occupational Therapy to evaluate and treat. Evaluate home environment for safety and equipment needs. Perform/Instruct on transfers, ADL training, ROM, and therapeutic exercises.  Aide to provide assistance with personal care, ADLs, and vital signs.    MISCELLANEOUS CARE:  Diabetic Care:   SN to perform and educate Diabetic management with blood glucose monitoring: and Report CBG < 60 or > 350 to physician.    WOUND CARE ORDERS  n/a      Medications: Review discharge medications with patient and family and provide education.      Current Discharge Medication List      START taking these medications    Details   carvedilol (COREG) 6.25 MG tablet Take 1 tablet (6.25 mg total) by mouth 2 (two) times daily.  Qty: 60 tablet, Refills: 1      folic acid-vit B6-vit B12 2.5-25-2 mg (FOLBIC OR EQUIV) 2.5-25-2 mg Tab Take 1 tablet by mouth once daily.  Qty: 30 tablet, Refills: 1      senna-docusate 8.6-50 mg (PERICOLACE) 8.6-50 mg per tablet Take 1 tablet by mouth 2 (two) times daily.      vitamin D 1000 units Tab Take 2 tablets (2,000 Units total) by mouth once daily.         CONTINUE these medications which have CHANGED    Details   amlodipine (NORVASC) 5 MG tablet Take 2 tablets (10 mg total) by mouth  once daily.  Qty: 90 tablet, Refills: 1         CONTINUE these medications which have NOT CHANGED    Details   aspirin (ECOTRIN) 81 MG EC tablet Take 81 mg by mouth once daily.      ibuprofen (ADVIL,MOTRIN) 600 MG tablet Take 1 tablet (600 mg total) by mouth every 6 (six) hours as needed for Pain.  Qty: 30 tablet, Refills: 5    Associated Diagnoses: Pain of left hip joint      levothyroxine (SYNTHROID) 50 MCG tablet Take 1 tablet (50 mcg total) by mouth before breakfast.  Qty: 90 tablet, Refills: 1      lisinopril (PRINIVIL,ZESTRIL) 40 MG tablet TAKE ONE TABLET BY MOUTH EVERY DAY  Qty: 90 tablet, Refills: 3      mirabegron (MYRBETRIQ) 25 mg Tb24 ER tablet Take 1 tablet (25 mg total) by mouth once daily.  Qty: 90 tablet, Refills: 4    Associated Diagnoses: Urge incontinence; Urinary urgency      PREMARIN vaginal cream Use vaginally Every other day      insulin aspart protamine-insulin aspart (NOVOLOG 70/30) 100 unit/mL (70-30) InPn pen Inject 16 units into skin with breakfast and 7 units with dinner  Qty: 1 Box, Refills: 6    Associated Diagnoses: Type 2 diabetes mellitus with diabetic polyneuropathy         STOP taking these medications       cetirizine (ZYRTEC) 10 MG tablet Comments:   Reason for Stopping:         ciprofloxacin HCl (CIPRO) 500 MG tablet Comments:   Reason for Stopping:         dicyclomine (BENTYL) 10 MG capsule Comments:   Reason for Stopping:         docusate sodium (COLACE) 50 MG capsule Comments:   Reason for Stopping:         oxybutynin (DITROPAN XL) 15 MG TR24 Comments:   Reason for Stopping:         pravastatin (PRAVACHOL) 40 MG tablet Comments:   Reason for Stopping:         tramadol (ULTRAM) 50 mg tablet Comments:   Reason for Stopping:           Please tell patient to stop taking oxybutynin (Ditropan) as well as this may cause increased confusion.     I certify that this patient is confined to her home and needs intermittent skilled nursing care, physical therapy and occupational  therapy.    Stacy Velásquez MD, PGY1

## 2017-05-26 NOTE — TELEPHONE ENCOUNTER
----- Message from Gayle So sent at 5/26/2017  9:57 AM CDT -----  Contact: Silvia with [facility] needs clarification on the patient's medication.  The patient was prescribed carvedilol and metoprolol.  Please call Natalya, Pharmacist in Charge with [facility], phone [phone].    Thanks!

## 2017-05-26 NOTE — DISCHARGE SUMMARY
DISCHARGE SUMMARY  Hospital Medicine    Team: [facility] HOSP MED 2    Patient Name: Carin Nicholas  YOB: 1929    Admit Date: 5/22/2017    Discharge Date: 05/26/2017    Discharge Attending Physician: Joyce Waite MD     Resident on Service: Stacy Velásquez MD    Chief Complaint: VIOLETA     Princilpal Diagnoses:  Active Hospital Problems    Diagnosis  POA    *VIOLETA (acute kidney injury) [N17.9]  Unknown    Bilateral chronic knee pain [M25.561, M25.562, G89.29]  Yes    Encephalopathy, metabolic [G93.41]  Unknown    Vitamin D insufficiency [E55.9]  Yes    Left hip pain [M25.552]  Yes    Urge incontinence [N39.41]  Yes    Essential hypertension [I10]  Yes     Chronic    Type 2 diabetes mellitus with diabetic chronic kidney disease [E11.22]  Yes     Chronic    Hyperlipidemia [E78.5]  Yes     Chronic    Hypothyroidism [E03.9]  Yes     Chronic      Resolved Hospital Problems    Diagnosis Date Resolved POA   No resolved problems to display.       Discharged Condition: Admit problems have stabilized      HOSPITAL COURSE:      Initial Presentation:    History of Present Illness:  Ms. Carin Nicholas is a 87 y.o. female (prefers French although may speak English) with HTN, T2DM with neuropathy/CKD, HLD, RA/OA, hypothyroidism, aortic atherosclerosis, chronic left hip pain, obesity, ?opiate dependence, and urge incontinence who presented to the ED via EMS as patient was found down in her apartment. Patient is a poor historian and appears somewhat confused, only oriented to self. States she has had worsening back, left hip, and abdominal pain for the past few months which is what brought her in. When asked which medicine she takes for pain, she states Tylenol but does not know all of her medications and continues to tell us to look in her cabinet for them as she thinks she is home. Patient states last BM 3-4 days ago.   States she has not taken her medicine for the past 3 days although when asked why, she tells  different stories (make her feel dizzy vs make her cough vs unable to fill prescription). She does admit to decreased appetite.     In ED, patient noted to have VIOLETA for which she was admitted.     Per patient's son (lives in [place]), he spoke with the  who stated pt was found outside her door confused. There were no reports of a fall to the son. Son states he last spoke with her Saturday (3 days prior) and she appeared to be oriented although he did note slurring of speech. He is unsure of her primary care doctor as patient also seen at [facility], however, per chart review, patient has also followed with Dr. Arash Espinoza.      Spoke with patient's pharmacist at [facility], who states has noticed steady decline in patient's mental status. Patient calling multiple times not remembering previously spoke with pharmacist. Also noted patient mixing her medications. Noted patient appears more weak and fatigued when she does present to pharmacy.     Course of Principle Problem for Admission:    Metabolic encephalopathy   - sepsis vs CVA vs dementia vs delirium vs medication effect vs UTI  - no leukocytosis or recorded fever, however, can not r/o infectious process   - lactate wnl  - repeat UA unremarkable; however, repeated after episode of dysuria, +leuks  - treated with 3 days of Rocephin  - urine cx, bcxs - NGTD   - CT head negative  - TSH wnl  - B12, folate wnl, thiamine pending  - it is possible patient with dementia as appears to have a progressive decline and increase in forgetfulness as well as episodes of possible sundowning during hospitalization. Work up negative for infectious cause or vitamin deficiency as above  - recommend follow up with neurology   - PT/OT initially recommending snf but patient and patient's son preferring to go home with home health and close monitoring of patient.    Other Medical Problems Addressed in the Hospital:    VIOLETA   - likely pre-renal given patient's decrease in po  intake   - urine studies consistent with pre-renal etiology   - resolved     UTI  -patient complained of dysuria, repeat UA dirty  -completed 3 days of treatment with ceftriaxone 1g q24     HTN   - norvasc 5 mg daily   - Toprol 25 mg daily switched to coreg 6.25 bid to optimize BP control   - lisinopril 40 mg daily     HLD  - continue pravastatin 40 mg daily      T2DM   - SSI      Hypothyroidism   - TSH wnl  - levothyroxine 50 mcg daily      Arthritis   - tylenol prn for pain   - recommend avoiding opioids given previous mental status changes      ?hx of falls   - Xrays negative for fracture or dislocation   - PT/OT      Constipation  - provided miralax daily, senna doc bid and lactulose 20 g x1  - patient with no abdominal complaints     Urge incontinence  - recommend holding home oxybutynin and mirabegron in setting of possible dementia     Vitals and physical exam on day of discharge:  Vitals:    05/26/17 1105   BP: (!) 196/82   Pulse: 68   Resp: 16   Temp: 97.8 °F (36.6 °C)       Physical Exam:  Constitutional: She appears well-developed and well-nourished. No distress.   HENT:   Head: Normocephalic and atraumatic.   Eyes: Conjunctivae and EOM are normal. Pupils are equal, round, and reactive to light.   Neck: Normal range of motion. Neck supple.   Cardiovascular: Normal rate, regular rhythm, intact distal pulses. CHA appreciated.  Pulmonary/Chest: Breath sounds normal. No respiratory distress. She has no wheezes. She has no rhonchi. She has no rales.   Abdominal: Soft. She exhibits no distension. Nondistended. Involuntary guarding to palpation in suprapubic area although denies pain and states her bladder is full   Musculoskeletal: moves extremities spontaneously   Neurological: She is oriented to self only. Following commands.   Skin: Skin is warm and dry. No rash noted.     CONSULTS: N/A    Last CBC/BMP/HgbA1c (if applicable):  Recent Results (from the past 336 hour(s))   CBC with Automated Differential     Collection Time: 05/26/17  3:52 AM   Result Value Ref Range    WBC 6.45 3.90 - 12.70 K/uL    Hemoglobin 12.4 12.0 - 16.0 g/dL    Hematocrit 38.0 37.0 - 48.5 %    Platelets 220 150 - 350 K/uL   CBC with Automated Differential    Collection Time: 05/25/17  3:59 AM   Result Value Ref Range    WBC 6.88 3.90 - 12.70 K/uL    Hemoglobin 12.9 12.0 - 16.0 g/dL    Hematocrit 40.1 37.0 - 48.5 %    Platelets 246 150 - 350 K/uL   CBC with Automated Differential    Collection Time: 05/24/17  5:41 AM   Result Value Ref Range    WBC 7.24 3.90 - 12.70 K/uL    Hemoglobin 12.9 12.0 - 16.0 g/dL    Hematocrit 38.5 37.0 - 48.5 %    Platelets 237 150 - 350 K/uL     Recent Results (from the past 336 hour(s))   Basic metabolic panel    Collection Time: 05/26/17  3:52 AM   Result Value Ref Range    Sodium 141 136 - 145 mmol/L    Potassium 3.3 (L) 3.5 - 5.1 mmol/L    Chloride 107 95 - 110 mmol/L    CO2 25 23 - 29 mmol/L    BUN, Bld 21 8 - 23 mg/dL    Creatinine 0.8 0.5 - 1.4 mg/dL    Calcium 8.8 8.7 - 10.5 mg/dL    Anion Gap 9 8 - 16 mmol/L     Lab Results   Component Value Date    HGBA1C 8.9 (H) 05/23/2017       Other Pertinent Lab Findings:    Results for TSERING VARNER (MRN [id number]) as of 5/26/2017 13:49   Ref. Range 5/26/2017 03:52   Sodium Latest Ref Range: 136 - 145 mmol/L 141   Potassium Latest Ref Range: 3.5 - 5.1 mmol/L 3.3 (L)   Chloride Latest Ref Range: 95 - 110 mmol/L 107   CO2 Latest Ref Range: 23 - 29 mmol/L 25   Anion Gap Latest Ref Range: 8 - 16 mmol/L 9   BUN, Bld Latest Ref Range: 8 - 23 mg/dL 21   Creatinine Latest Ref Range: 0.5 - 1.4 mg/dL 0.8   eGFR if non African American Latest Ref Range: >60 mL/min/1.73 m^2 >60.0   eGFR if African American Latest Ref Range: >60 mL/min/1.73 m^2 >60.0   Glucose Latest Ref Range: 70 - 110 mg/dL 187 (H)   Calcium Latest Ref Range: 8.7 - 10.5 mg/dL 8.8   Phosphorus Latest Ref Range: 2.7 - 4.5 mg/dL 4.1   Magnesium Latest Ref Range: 1.6 - 2.6 mg/dL 1.7   Results for TSERING VARNER (MRN  [id number]) as of 5/26/2017 13:49   Ref. Range 5/23/2017 18:34   Vit D, 25-Hydroxy Latest Ref Range: 30 - 96 ng/mL 17 (L)   Results for TSERING VARNER (MRN [id number]) as of 5/26/2017 13:49   Ref. Range 5/23/2017 13:10   Vitamin B-12 Latest Ref Range: 210 - 950 pg/mL 227     Pertinent/Significant Diagnostic Studies:          Microbiology Results (last 7 days)     Procedure Component Value Units Date/Time    Blood culture (site 2) [[id number]] Collected:  05/22/17 2306    Order Status:  Completed Specimen:  Blood Updated:  05/26/17 0612     Blood Culture, Routine No Growth to date     Blood Culture, Routine No Growth to date     Blood Culture, Routine No Growth to date     Blood Culture, Routine No Growth to date    Narrative:       Site # 2, aerobic only    Blood culture (site 1) [[id number]] Collected:  05/22/17 1931    Order Status:  Completed Specimen:  Blood from Peripheral, Forearm, Right Updated:  05/25/17 2212     Blood Culture, Routine No Growth to date     Blood Culture, Routine No Growth to date     Blood Culture, Routine No Growth to date     Blood Culture, Routine No Growth to date    Narrative:       Site # 1, aerobic and anaerobic (central line, if patient has  one)    Urine culture [[id number]] Collected:  05/22/17 1925    Order Status:  Completed Specimen:  Urine from Urine, Catheterized Updated:  05/24/17 0043     Urine Culture, Routine No growth    Blood culture (site 2) [[id number]]     Order Status:  Canceled Specimen:  Blood         Imaging Results          X-Ray Chest 1 View (Final result)  Result time 05/23/17 14:51:04    Final result by Arjun Israel III, MD (05/23/17 14:51:04)                 Impression:     No acute process seen.      Electronically signed by: ARJNU ISRAEL  Date:     05/23/17  Time:    14:51              Narrative:    One view: There are chronic erosive changes of the shoulders and DJD.  There is mild cardiomegaly and aortic plaque.  Lungs are clear.                             CT  Head Without Contrast (Final result)  Result time 05/23/17 02:39:24    Final result by Ken Cochran MD (05/23/17 02:39:24)                 Impression:         Punctate foci of hypoattenuation along the right paramedian occipital lobe, nonspecific, could reflect volume averaging from sulci or encephalomalacia possibly on the basis of previous infarct or insult, correlation is recommended.    Marked right maxillary sinus disease, fungal component not excluded.    Senescent changes as above.      ______________________________________     Electronically signed by resident: CAMILLE BECERRA MD  Date:     05/23/17  Time:    02:22            As the supervising and teaching physician, I personally reviewed the images and resident's interpretation and I agree with the findings.          Electronically signed by: KEN COCHRAN MD  Date:     05/23/17  Time:    02:39              Narrative:    Clinical indication: Altered mental status.    Comparison: None.    Technique: 5-mm axial images of the head were performed without the administration of contrast.  Sagittal and coronal reformatted images were also obtained.    Findings:    Generalized cerebral volume loss with compensatory enlargement of the ventricles appropriate for the patient's age. Mild degree of patchy decreased attenuation in the supratentorial white matter compatible with chronic microvascular ischemic change.  There are a few punctate foci of hypoattenuation along the right paramedian occipital lobe, nonspecific.  No evidence of acute major territorial infarction or hemorrhage.  The ventricular system shows no distortion by mass-effect or evidence of hydrocephalus. The visualized paranasal sinuses demonstrate near complete opacification of the right maxillary sinus with heterogeneous material likely reflecting inspissated mucus in the setting of chronic sinusitis, fungal component not excluded. The right frontal sinus is hypoplastic. The mastoid air cells  are clear.  The osseous structures show no evidence of fracture or any other abnormality. There are postsurgical changes of the bilateral orbits. The surrounding soft tissues are unremarkable.                             X-Ray Hip 2 View Left (Final result)  Result time 05/22/17 12:56:51    Final result by Paul Lizama MD (05/22/17 12:56:51)                 Impression:     See above      Electronically signed by: Paul Lizama MD  Date:     05/22/17  Time:    12:56              Narrative:    Bones of the pelvis and left hip appear normal.  The no fracture or dislocation.                             X-Ray Knee 1 or 2 View Bilateral (Final result)  Result time 05/22/17 12:57:45    Final result by Paul Lizama MD (05/22/17 12:57:45)                 Impression:     See above      Electronically signed by: Paul Lizama MD  Date:     05/22/17  Time:    12:57              Narrative:    Some mild narrowing and degenerative change can be seen in both knee joints.  Prominent patellofemoral degenerative change is seen particularly on the left.  No joint effusion is noted.  Vascular calcifications are present.                             X-Ray Lumbar Spine Ap And Lateral (Final result)  Result time 05/22/17 12:58:22    Final result by Paul Lizama MD (05/22/17 12:58:22)                 Impression:     See above      Electronically signed by: Paul Lizama MD  Date:     05/22/17  Time:    12:58              Narrative:    Scoliosis with convexity to the right can be seen.  Narrowing and degenerative change can be seen at several levels in the upper lumbar spine.  No bony destruction is noted.                             X-Ray Ankle Complete Left (Final result)  Result time 05/22/17 12:58:47    Final result by Paul Lizama MD (05/22/17 12:58:47)                 Impression:     Normal findings      Electronically signed by: Paul Lizama MD  Date:     05/22/17  Time:    12:58               Narrative:    Bones of the left ankle are intact no fracture or significant abnormality                              Special Treatments/Procedures: N/A    Disposition:  Home with Home Health    Future Scheduled Appointments:  Future Appointments  Date Time Provider Department Center   5/31/2017 10:00 AM Arash Espinoza DO [facility] [place]       Discharge Medication List:     Carin Nicholas   Home Medication Instructions ELIZABETH:[id number]    Printed on:05/26/17 5463   Medication Information                      amlodipine (NORVASC) 5 MG tablet  Take 2 tablets (10 mg total) by mouth once daily.             aspirin (ECOTRIN) 81 MG EC tablet  Take 81 mg by mouth once daily.             carvedilol (COREG) 6.25 MG tablet  Take 1 tablet (6.25 mg total) by mouth 2 (two) times daily.             folic acid-vit B6-vit B12 2.5-25-2 mg (FOLBIC OR EQUIV) 2.5-25-2 mg Tab  Take 1 tablet by mouth once daily.             ibuprofen (ADVIL,MOTRIN) 600 MG tablet  Take 1 tablet (600 mg total) by mouth every 6 (six) hours as needed for Pain.             insulin aspart protamine-insulin aspart (NOVOLOG 70/30) 100 unit/mL (70-30) InPn pen  Inject 16 units into skin with breakfast and 7 units with dinner             levothyroxine (SYNTHROID) 50 MCG tablet  Take 1 tablet (50 mcg total) by mouth before breakfast.             lisinopril (PRINIVIL,ZESTRIL) 40 MG tablet  TAKE ONE TABLET BY MOUTH EVERY DAY             mirabegron (MYRBETRIQ) 25 mg Tb24 ER tablet  Take 1 tablet (25 mg total) by mouth once daily.             PREMARIN vaginal cream  Use vaginally Every other day             senna-docusate 8.6-50 mg (PERICOLACE) 8.6-50 mg per tablet  Take 1 tablet by mouth 2 (two) times daily.             vitamin D 1000 units Tab  Take 2 tablets (2,000 Units total) by mouth once daily.                 Patient Instructions:    Discharge Procedure Orders  WALKER FOR HOME USE   Order Specific Question Answer Comments   Type of Walker: Rollator    With  wheels? Yes    Height: 5' (1.524 m)    Weight: 70.2 kg (154 lb 11.2 oz)    Length of need (1-99 months): 12    Does patient have medical equipment at home? cane, straight    Does patient have medical equipment at home? grab bar    Please check all that apply: Patient's condition impairs ambulation.    Please check all that apply: Patient is unable to safely ambulate without equipment.      BATH/SHOWER CHAIR FOR HOME USE   Order Specific Question Answer Comments   Height: 5' (1.524 m)    Weight: 70.2 kg (154 lb 11.2 oz)    Does patient have medical equipment at home? cane, straight    Does patient have medical equipment at home? grab bar    Length of need (1-99 months): 12    Type: With back      Ambulatory Referral to Neurology   Referral Priority: Routine Referral Type: Consultation   Referral Reason: Specialty Services Required    Requested Specialty: Neurology    Number of Visits Requested: 1      Ambulatory referral to Outpatient Case Management   Referral Priority: Routine Referral Type: Consultation   Referral Reason: Specialty Services Required    Number of Visits Requested: 1          At the time of discharge patient was told to take all medications as prescribed, to keep all followup appointments, and to call their primary care physician or return to the emergency room if they have any worsening or concerning symptoms.    Signing Physician:  Stacy Velásquez MD

## 2017-05-26 NOTE — PLAN OF CARE
Future Appointments  Date Time Provider Department Center   5/31/2017 10:00 AM Arash Espinoza DO [facility] [place]        05/26/17 1430   Final Note   Assessment Type Final Discharge Note   Discharge Disposition Home-Health   What phone number can be called within the next 1-3 days to see how you are doing after discharge? [phone]   Hospital Follow Up  Appt(s) scheduled? Yes   Offered [facility] -- Bedside Delivery? n/a   Discharge/Hospital Encounter Summary to (non-[facility]) PCP n/a   Referral to Outpatient Case Management complete? Yes   Referral to / orders for Home Health Complete? Yes  ([facility])   30 day supply of medicines given at discharge, if documented non-compliance / non-adherence? n/a   Any social issues identified prior to discharge? No   Did you assess the readiness or willingness of the family or caregiver to support self management of care? Yes   Right Care Referral Info   Post Acute Recommendation Home-care   Facility Name [facility]

## 2017-05-26 NOTE — PLAN OF CARE
Ssc received a call from Mely @ 2:00 and delivered @ 2:15 a rollator and a shower chair        Chiquis/pavel

## 2017-05-26 NOTE — PLAN OF CARE
Sw did fax hh orders to [facility] to  for hh provider. Pt to d/c home with son today, Sw to follow.

## 2017-05-27 LAB — BACTERIA BLD CULT: NORMAL

## 2017-05-28 ENCOUNTER — NURSE TRIAGE (OUTPATIENT)
Dept: ADMINISTRATIVE | Facility: CLINIC | Age: 82
End: 2017-05-28

## 2017-05-28 PROBLEM — N39.0 UTI (URINARY TRACT INFECTION): Status: ACTIVE | Noted: 2017-05-28

## 2017-05-28 LAB — BACTERIA BLD CULT: NORMAL

## 2017-05-28 RX ORDER — INSULIN ASPART 100 [IU]/ML
INJECTION, SUSPENSION SUBCUTANEOUS
Qty: 1 BOX | Refills: 0 | Status: SHIPPED | OUTPATIENT
Start: 2017-05-28 | End: 2017-08-30 | Stop reason: SDUPTHER

## 2017-05-28 NOTE — TELEPHONE ENCOUNTER
"    Reason for Disposition   [1] Request for URGENT new prescription or refill of "essential" medication (i.e., likelihood of harm to patient if not taken) AND [2] triager unable to fill per unit policy    Protocols used: ST MEDICATION QUESTION CALL-[facility]-LAUR[facility] (Home Health) calling requesting refill for Novolog 70/30.  Called and discussed with On Call MD (LETA Hilton MD); MD approved medication.  Novolog 70/30 [facility].  Called and notified Home Health Nurse.    "

## 2017-05-28 NOTE — PT/OT/SLP PROGRESS
Physical Therapy   Discharge    Carin Nicholas   MRN: [id number]     Hospital discharge with PT eval only completed; therefore, no goals met. Pt. discharged home with home health.    Pal Villaseñor, PT  5/26/2017

## 2017-05-29 RX ORDER — MELOXICAM 15 MG/1
TABLET ORAL
Qty: 30 TABLET | Refills: 0 | OUTPATIENT
Start: 2017-05-29

## 2017-05-30 ENCOUNTER — OUTPATIENT CASE MANAGEMENT (OUTPATIENT)
Dept: ADMINISTRATIVE | Facility: OTHER | Age: 82
End: 2017-05-30

## 2017-05-30 ENCOUNTER — PATIENT OUTREACH (OUTPATIENT)
Dept: ADMINISTRATIVE | Facility: CLINIC | Age: 82
End: 2017-05-30
Payer: MEDICARE

## 2017-05-30 NOTE — PATIENT INSTRUCTIONS
Discharge Instructions for Acute Kidney Injury  You have been diagnosed with acute kidney injury. This means that your kidneys are not working properly. When both kidneys are healthy, they help filter out fluid and waste from the blood and body. Acute kidney injury has many causes including urinary blockages, infection, lack of enough blood supply, and medications that can injure kidneys. In some cases, acute kidney injury is temporary, lasting several days to a few months, because the kidney has the capacity to repair itself. But, it may also result in chronic kidney disease or end stage renal failure. Here are some instructions for you to follow as you recover.  Home care  · Follow any instructions for eating and drinking given to you by your doctor.  ¨ Drink less fluid, if instructed by your doctor.  ¨ Keep a record of everything you eat and drink.  · Measure the amount of urine and stool you have each day.  · Weigh yourself every day, at the same time of day, and in the same kind of clothes. Keep a daily record of your daily weights.  · Take your temperature every day. Keep a record of the results.  · Learn to take your own blood pressure. Keep a record of your results. Ask your doctor when you should seek emergency medical attention. He or she will tell you which blood pressure reading is dangerous.  · Avoid contact with people who have infections (colds, bronchitis, or skin conditions).  · Practice good personal hygiene. This is especially important if you have a catheter in place when you leave the hospital. Doing so helps keep you safe from infection.  · Take your medications exactly as directed.  · You may require frequent blood and urine tests to monitor your kidney function.  Follow-up care  Make a follow-up appointment as directed by our staff.     When to seek medical care  Call your doctor right away if you have any of the following:  · Signs of bladder infection (urinating more often than usual;  burning, pain, bleeding, or hesitancy when you urinate)  · Signs of infection around your catheter (redness, swelling, warmth, or drainage)  · Rapid weight loss or gain, such as 3 pounds or more in 24 hours or 6 pounds or more in 7 days  · Fever above 100.4°F (38.0°C) or chills  · Muscle aches  · Night sweats  · Very little or no urine output  · Swelling of your hands, legs, or feet  · Back pain  · Abdominal pain  · Extreme tiredness   Date Last Reviewed: 1/6/2015  © 2972-0881 [employer]. [address] [place] [address]. All rights reserved. This information is not intended as a substitute for professional medical care. Always follow your healthcare professional's instructions.

## 2017-05-30 NOTE — PROGRESS NOTES
Please note the following patient's information has been forwarded to [facility] for case mgmt or  by Outpatient Case Management.    Please see the media section of patient's chart for additional details.    Please contact Ext. [phone] with any questions.    Thank you,    Brandy Conteh, SSC

## 2017-05-31 ENCOUNTER — OFFICE VISIT (OUTPATIENT)
Dept: INTERNAL MEDICINE | Facility: CLINIC | Age: 82
End: 2017-05-31
Payer: MEDICARE

## 2017-05-31 VITALS
SYSTOLIC BLOOD PRESSURE: 119 MMHG | HEIGHT: 60 IN | WEIGHT: 154.31 LBS | TEMPERATURE: 98 F | DIASTOLIC BLOOD PRESSURE: 80 MMHG | BODY MASS INDEX: 30.29 KG/M2 | RESPIRATION RATE: 16 BRPM | HEART RATE: 51 BPM

## 2017-05-31 DIAGNOSIS — E11.59 OBESITY, DIABETES, AND HYPERTENSION SYNDROME: Chronic | ICD-10-CM

## 2017-05-31 DIAGNOSIS — E78.5 HYPERLIPIDEMIA, UNSPECIFIED HYPERLIPIDEMIA TYPE: Chronic | ICD-10-CM

## 2017-05-31 DIAGNOSIS — F03.90 DEMENTIA WITHOUT BEHAVIORAL DISTURBANCE, UNSPECIFIED DEMENTIA TYPE: Primary | ICD-10-CM

## 2017-05-31 DIAGNOSIS — Z79.4 TYPE 2 DIABETES MELLITUS WITH DIABETIC POLYNEUROPATHY, WITH LONG-TERM CURRENT USE OF INSULIN: Chronic | ICD-10-CM

## 2017-05-31 DIAGNOSIS — I15.2 OBESITY, DIABETES, AND HYPERTENSION SYNDROME: Chronic | ICD-10-CM

## 2017-05-31 DIAGNOSIS — E66.9 OBESITY, DIABETES, AND HYPERTENSION SYNDROME: Chronic | ICD-10-CM

## 2017-05-31 DIAGNOSIS — D69.2 SENILE PURPURA: ICD-10-CM

## 2017-05-31 DIAGNOSIS — E11.22 TYPE 2 DIABETES MELLITUS WITH STAGE 3 CHRONIC KIDNEY DISEASE, WITH LONG-TERM CURRENT USE OF INSULIN: Chronic | ICD-10-CM

## 2017-05-31 DIAGNOSIS — R41.82 ALTERED MENTAL STATUS, UNSPECIFIED: ICD-10-CM

## 2017-05-31 DIAGNOSIS — I70.0 AORTIC ATHEROSCLEROSIS: Chronic | ICD-10-CM

## 2017-05-31 DIAGNOSIS — I10 ESSENTIAL HYPERTENSION: Chronic | ICD-10-CM

## 2017-05-31 DIAGNOSIS — N18.30 TYPE 2 DIABETES MELLITUS WITH STAGE 3 CHRONIC KIDNEY DISEASE, WITH LONG-TERM CURRENT USE OF INSULIN: Chronic | ICD-10-CM

## 2017-05-31 DIAGNOSIS — E11.69 OBESITY, DIABETES, AND HYPERTENSION SYNDROME: Chronic | ICD-10-CM

## 2017-05-31 DIAGNOSIS — M06.9 RHEUMATOID ARTHRITIS OF HAND, UNSPECIFIED LATERALITY, UNSPECIFIED RHEUMATOID FACTOR PRESENCE: Chronic | ICD-10-CM

## 2017-05-31 DIAGNOSIS — Z79.4 TYPE 2 DIABETES MELLITUS WITH STAGE 3 CHRONIC KIDNEY DISEASE, WITH LONG-TERM CURRENT USE OF INSULIN: Chronic | ICD-10-CM

## 2017-05-31 DIAGNOSIS — E03.4 HYPOTHYROIDISM DUE TO ACQUIRED ATROPHY OF THYROID: Chronic | ICD-10-CM

## 2017-05-31 DIAGNOSIS — N39.41 URGE INCONTINENCE: ICD-10-CM

## 2017-05-31 DIAGNOSIS — E11.42 TYPE 2 DIABETES MELLITUS WITH DIABETIC POLYNEUROPATHY, WITH LONG-TERM CURRENT USE OF INSULIN: Chronic | ICD-10-CM

## 2017-05-31 PROCEDURE — 99499 UNLISTED E&M SERVICE: CPT | Mod: S$GLB,,, | Performed by: INTERNAL MEDICINE

## 2017-05-31 PROCEDURE — 99999 PR PBB SHADOW E&M-EST. PATIENT-LVL III: CPT | Mod: PBBFAC,,, | Performed by: INTERNAL MEDICINE

## 2017-05-31 PROCEDURE — 1159F MED LIST DOCD IN RCRD: CPT | Mod: S$GLB,,, | Performed by: INTERNAL MEDICINE

## 2017-05-31 PROCEDURE — 1126F AMNT PAIN NOTED NONE PRSNT: CPT | Mod: S$GLB,,, | Performed by: INTERNAL MEDICINE

## 2017-05-31 PROCEDURE — 99215 OFFICE O/P EST HI 40 MIN: CPT | Mod: S$GLB,,, | Performed by: INTERNAL MEDICINE

## 2017-05-31 RX ORDER — PEN NEEDLE, DIABETIC 32 GX 1/4"
NEEDLE, DISPOSABLE MISCELLANEOUS
COMMUNITY
Start: 2017-05-28

## 2017-05-31 NOTE — PROGRESS NOTES
Subjective:       Patient ID: Carin Nicholas is a 87 y.o. female.    Chief Complaint: Follow-up (hospital f/u)    HPI   Pt accompanied by her son with HTN, T2DM with neuropathy/CKD, Dementia, HLD, RA/OA, hypothyroidism, aortic atherosclerosis, chronic left hip pain, obesity, urge incontinence, senile purpura is here for hospital f/u from 5/22/17-5/26/17 for AMS associated with VIOLETA(pre-renal). Pt was treated for possible UTI while admitted but all cultures came back negative. Vitamin deficiency r/o. Pt feels her mentation has continued to improve since discharge. Per the son her mentation seems to worsen as the day progresses. No further weakness or any fevers/chills, abdominal pain. Pt already has [facility] and will have a  evaluate her tomorrow as she will likely need placement.    Review of Systems   Constitutional: Negative for activity change, appetite change, chills, diaphoresis, fatigue, fever and unexpected weight change.   HENT: Negative for congestion, mouth sores, postnasal drip, rhinorrhea, sinus pressure, sneezing, sore throat, trouble swallowing and voice change.    Eyes: Negative for pain, discharge and visual disturbance.   Respiratory: Negative for cough, shortness of breath and wheezing.    Cardiovascular: Negative for chest pain, palpitations and leg swelling.   Gastrointestinal: Negative for abdominal pain, blood in stool, constipation, diarrhea, nausea and vomiting.   Endocrine: Negative for cold intolerance and heat intolerance.   Genitourinary: Negative for difficulty urinating, dysuria, frequency, hematuria and urgency.   Musculoskeletal: Positive for arthralgias. Negative for myalgias.   Skin: Negative for rash and wound.   Allergic/Immunologic: Negative for environmental allergies and food allergies.   Neurological: Negative for dizziness, tremors, seizures, syncope, weakness, light-headedness and headaches.   Hematological: Negative for adenopathy. Does not bruise/bleed easily.    Psychiatric/Behavioral: Positive for confusion. Negative for sleep disturbance. The patient is not nervous/anxious.        Objective:      Physical Exam   Constitutional: She is oriented to person, place, and time. She appears well-developed and well-nourished. No distress.   HENT:   Head: Normocephalic and atraumatic.   Right Ear: External ear normal.   Left Ear: External ear normal.   Nose: Nose normal.   Mouth/Throat: Oropharynx is clear and moist. No oropharyngeal exudate.   Eyes: Conjunctivae and EOM are normal. Pupils are equal, round, and reactive to light. Right eye exhibits no discharge. Left eye exhibits no discharge. No scleral icterus.   Neck: Neck supple. No JVD present. No thyromegaly present.   Cardiovascular: Normal rate, regular rhythm, normal heart sounds and intact distal pulses.    No murmur heard.  Pulmonary/Chest: Effort normal and breath sounds normal. No respiratory distress. She has no wheezes. She has no rales. She exhibits no tenderness.   Abdominal: Soft. She exhibits no distension. There is no tenderness. There is no guarding.   Musculoskeletal: She exhibits no edema.   Lymphadenopathy:     She has no cervical adenopathy.   Neurological: She is alert and oriented to person, place, and time.   Skin: Skin is warm and dry. No rash noted. She is not diaphoretic. No pallor.   Psychiatric: She has a normal mood and affect. Her behavior is normal. Judgment and thought content normal.   Nursing note and vitals reviewed.      Assessment:       1. Dementia without behavioral disturbance, unspecified dementia type    2. Altered mental status, unspecified    3. Essential hypertension    4. Type 2 diabetes mellitus with diabetic polyneuropathy, with long-term current use of insulin    5. Type 2 diabetes mellitus with stage 3 chronic kidney disease, with long-term current use of insulin    6. Hyperlipidemia, unspecified hyperlipidemia type    7. Rheumatoid arthritis of hand, unspecified laterality,  unspecified rheumatoid factor presence    8. Hypothyroidism due to acquired atrophy of thyroid    9. Aortic atherosclerosis    10. Obesity, diabetes, and hypertension syndrome    11. Urge incontinence    12. Senile purpura        Plan:    1. Dementia/AMS- improving, referral to Neurology        Pt has HHC/PT/OT and will be followed by a  for possible placement at NH   2. VIOLETA- resolved s/p IVF   3. HTN- controlled, continue Lisinopril 40 mg/Norvasc 5 mg daily and Coreg 6.25 mg BID   4. T2DM with neuropathy/CKD on insulin- last HA1C of 8.9(5/17)<--9.1(9/16)<--7.5(3/16)<--8.4(11/14)       Pt has resumed Novolog as prescribed(was not taking as prescribed)   3. HLD- stable on Pravastatin 40 mg/Lovaza daily   4. RA/OA- seronegative, stable on Mobic, pt did not f/u with Rheumatology as recommended    5. Hypothyroidism- stable on Synthroid 50 mcg daily   6. Aortic atherosclerosis- stable, continue ASA/statin   7. Chronic left hip pain- Ultram on hold 2/2 AMS       May use Tylenol PRN for now     8. Diabetes, Obesity and HTN- stable, continue to monitor   9. Urge incontinence- stable on Myrbetriq 25 mg daily  10. Senile purpura- stable, continue to monitor   11. F/u in 3 months for annual exam     Over 1/2 of 40 minute visit spent reviewing pt's medical/hospitalization records, education/discussion of pt's medical conditions and medical management

## 2017-05-31 NOTE — PT/OT/SLP DISCHARGE
Occupational Therapy Discharge Summary    Carin Nicholas  MRN: [id number]   VIOLETA (acute kidney injury)   Patient Discharged from acute Occupational Therapy on 05/26/17.  Please refer to prior OT note dated on 05/23/17 for functional status.     Assessment:   Patient was discharge unexpectedly.  Information required to complete and accurate discharge summary is unknown.  Refer to therapy initial evaluation and last progress note for initial and most recent functional status and goal achievement.  Recommendations made may be found in medical record.  GOALS:    Occupational Therapy Goals        Problem: Occupational Therapy Goal    Goal Priority Disciplines Outcome Interventions   Occupational Therapy Goal     OT, PT/OT Ongoing (interventions implemented as appropriate)    Description:  Goals to be met by: 05/30/17     Patient will increase functional independence with ADLs by performing:    UE Dressing with [place].  LE Dressing with Modified [place].  Grooming while standing with Modified [place].  Toileting from toilet with Modified [place] for hygiene and clothing management.   Toilet transfer to toilet with Modified [place].  Upper extremity exercise program x15 reps per handout, with supervision.                    Reasons for Discontinuation of Therapy Services  Transfer to alternate level of care.      Plan:  Patient Discharged to: SNF recommended by OT - pt discharged to home health.

## 2017-06-02 ENCOUNTER — TELEPHONE (OUTPATIENT)
Dept: INTERNAL MEDICINE | Facility: CLINIC | Age: 82
End: 2017-06-02

## 2017-06-02 RX ORDER — LANCETS
1 EACH MISCELLANEOUS 3 TIMES DAILY
Qty: 200 EACH | Refills: 11 | Status: SHIPPED | OUTPATIENT
Start: 2017-06-02

## 2017-06-02 RX ORDER — INSULIN PUMP SYRINGE, 3 ML
EACH MISCELLANEOUS
Qty: 1 EACH | Refills: 0 | Status: SHIPPED | OUTPATIENT
Start: 2017-06-02 | End: 2018-06-02

## 2017-06-26 RX ORDER — LISINOPRIL 40 MG/1
TABLET ORAL
Qty: 90 TABLET | Refills: 0 | Status: SHIPPED | OUTPATIENT
Start: 2017-06-26 | End: 2017-11-24 | Stop reason: SDUPTHER

## 2017-06-28 RX ORDER — MELOXICAM 15 MG/1
TABLET ORAL
Qty: 30 TABLET | Refills: 0 | OUTPATIENT
Start: 2017-06-28

## 2017-07-28 RX ORDER — MELOXICAM 15 MG/1
TABLET ORAL
Qty: 30 TABLET | Refills: 0 | OUTPATIENT
Start: 2017-07-28

## 2017-08-28 RX ORDER — MELOXICAM 15 MG/1
TABLET ORAL
Qty: 30 TABLET | Refills: 0 | OUTPATIENT
Start: 2017-08-28

## 2017-08-30 RX ORDER — AMLODIPINE BESYLATE 5 MG/1
TABLET ORAL
Qty: 90 TABLET | Refills: 3 | Status: ON HOLD | OUTPATIENT
Start: 2017-08-30 | End: 2018-01-29 | Stop reason: HOSPADM

## 2017-08-30 NOTE — TELEPHONE ENCOUNTER
----- Message from Kelli Leblanc sent at 8/30/2017  4:22 PM CDT -----  Contact: Amandeep Rhodes [phone] or [phone]  RX request - refill or new RX.  Is this a refill or new RX:  Refill  RX name and strength: Folic acid-vit B6-vit B12 2.5-25-2 mg (FOLBIC OR EQUIV) 2.5-25-2 mg Tab   Directions: Take 1 tablet by mouth once daily  Is this a 30 day or 90 day RX:    [facility] [phone] (phone) [phone] (Fax)  Comments:      RX request - refill or new RX.  Is this a refill or new RX:  Refill  RX name and strength: Carvedilol (COREG) 6.25 MG tablet  Directions: Take 1 tablet (6.25 mg total) by mouth 2 (two) times daily  Is this a 30 day or 90 day RX:    [facility] and phone #[facility] [phone] (phone) [phone] (Fax)  Comments:        RX request - refill or new RX.  Is this a refill or new RX:  Refill  RX name and strength: Insulin aspart protamine-insulin aspart (NOVOLOG 70/30) 100 unit/mL (70-30) InPn pen  Directions: Inject 16 units into skin with breakfast and 7 units with dinner  Is this a 30 day or 90 day RX:    [facility] and phone #: [facility] [phone] (phone) [phone] (Fax)  Comments:

## 2017-08-31 RX ORDER — CARVEDILOL 6.25 MG/1
6.25 TABLET ORAL 2 TIMES DAILY
Qty: 60 TABLET | Refills: 5 | Status: ON HOLD | OUTPATIENT
Start: 2017-08-31 | End: 2018-01-29 | Stop reason: HOSPADM

## 2017-08-31 RX ORDER — INSULIN ASPART 100 [IU]/ML
INJECTION, SUSPENSION SUBCUTANEOUS
Qty: 1 BOX | Refills: 5 | Status: SHIPPED | OUTPATIENT
Start: 2017-08-31

## 2017-09-26 RX ORDER — MELOXICAM 15 MG/1
TABLET ORAL
Qty: 30 TABLET | Refills: 0 | OUTPATIENT
Start: 2017-09-26

## 2017-10-26 RX ORDER — MELOXICAM 15 MG/1
TABLET ORAL
Qty: 30 TABLET | Refills: 0 | OUTPATIENT
Start: 2017-10-26

## 2017-11-15 ENCOUNTER — TELEPHONE (OUTPATIENT)
Dept: INTERNAL MEDICINE | Facility: CLINIC | Age: 82
End: 2017-11-15

## 2017-11-15 NOTE — TELEPHONE ENCOUNTER
----- Message from Kathy Estrada sent at 11/15/2017 11:20 AM CST -----  Contact: self   Patient would like to get medical advice.  Symptoms (please be specific):  frequent urination, pain   How long has patient had these symptoms:  1  Pharm[facility] name and phone #:  [id number] - [phone] (Phone)  [phone] (Fax)  Any drug allergies:  No   Comments:

## 2017-11-15 NOTE — TELEPHONE ENCOUNTER
----- Message from Willian Miranda sent at 11/15/2017 10:55 AM CST -----  Contact: Pt at [phone]   Patient would like to get medical advice.  Symptoms (please be specific):  Frequent urination,bladder pain   How long has patient had these symptoms:  Since last night   Pharmacy name and phone #:   [id number] - [place], [place]  [facility]  [phone] (Phone)  [phone] (Fax)      Any drug allergies:  none  Comments: Pt did not want to schedule appt. Pt would like medication to be called in to pharmacy for her.

## 2017-11-24 RX ORDER — LISINOPRIL 40 MG/1
40 TABLET ORAL DAILY
Qty: 90 TABLET | Refills: 0 | Status: SHIPPED | OUTPATIENT
Start: 2017-11-24

## 2017-11-24 RX ORDER — LISINOPRIL 40 MG/1
TABLET ORAL
Qty: 30 TABLET | Refills: 0 | OUTPATIENT
Start: 2017-11-24

## 2017-12-28 ENCOUNTER — NURSE TRIAGE (OUTPATIENT)
Dept: ADMINISTRATIVE | Facility: CLINIC | Age: 82
End: 2017-12-28

## 2017-12-28 NOTE — TELEPHONE ENCOUNTER
"Carin's son called.  She fell yesterday on her tailbone, and today she has severe pain with difficulty walking and sitting due to the pain.  Recommended Ron take her to the ED now for evaluation.  He states he will need to "talk her into it".  Explained that she is at high risk for fracture, given her age.  Message to Arash Espinoza DO ,pcp.  Please contact caller directly with any additional care advice.       Reason for Disposition   Can't walk or very difficult to walk    Answer Assessment - Initial Assessment Questions  1. MECHANISM: "How did the injury happen?"        Fall on it and has been having pain.    2. ONSET: "When did the injury happen?" (Minutes or hours ago)       Yesterday.     3. LOCATION: "Where is the injury located?"       My tailbone.    4. SEVERITY: "Can you sit?" "Can you walk?"       Hurts to walk, hurts when I sit.    5. PAIN: "Is there pain?" If so, ask: "How bad is the pain?"    (Scale 1-10; or mild, moderate, severe)      Very bad.    6. SIZE: For bruises, or swelling, ask: "How large is it?" (e.g., inches or centimeters)       No     7. OTHER SYMPTOMS: "Do you have any other symptoms?" (e.g., numbness, back pain)      No.    8. PREGNANCY: "Is there any chance you are pregnant?" "When was your last menstrual period?"      N/a    Protocols used: [facility] INJURY-A-      "

## 2018-01-11 ENCOUNTER — HOSPITAL ENCOUNTER (OUTPATIENT)
Facility: HOSPITAL | Age: 83
Discharge: HOME HEALTH | End: 2018-01-13
Attending: EMERGENCY MEDICINE | Admitting: HOSPITALIST
Payer: MEDICARE

## 2018-01-11 DIAGNOSIS — E11.22 TYPE 2 DIABETES MELLITUS WITH DIABETIC CHRONIC KIDNEY DISEASE: Chronic | ICD-10-CM

## 2018-01-11 DIAGNOSIS — M48.061 SPINAL STENOSIS OF LUMBAR REGION, UNSPECIFIED WHETHER NEUROGENIC CLAUDICATION PRESENT: ICD-10-CM

## 2018-01-11 DIAGNOSIS — S39.92XA LOWER BACK INJURY, INITIAL ENCOUNTER: ICD-10-CM

## 2018-01-11 DIAGNOSIS — R29.898 LOWER EXTREMITY WEAKNESS: ICD-10-CM

## 2018-01-11 DIAGNOSIS — R32 URINARY INCONTINENCE, UNSPECIFIED TYPE: ICD-10-CM

## 2018-01-11 DIAGNOSIS — M48.00 SPINAL STENOSIS, UNSPECIFIED SPINAL REGION: Primary | ICD-10-CM

## 2018-01-11 LAB
ALBUMIN SERPL BCP-MCNC: 3.8 G/DL
ALP SERPL-CCNC: 86 U/L
ALT SERPL W/O P-5'-P-CCNC: 12 U/L
ANION GAP SERPL CALC-SCNC: 6 MMOL/L
AST SERPL-CCNC: 18 U/L
BASOPHILS # BLD AUTO: 0.05 K/UL
BASOPHILS NFR BLD: 0.7 %
BILIRUB SERPL-MCNC: 0.4 MG/DL
BILIRUB UR QL STRIP: NEGATIVE
BUN SERPL-MCNC: 20 MG/DL
CALCIUM SERPL-MCNC: 10.1 MG/DL
CHLORIDE SERPL-SCNC: 102 MMOL/L
CLARITY UR REFRACT.AUTO: CLEAR
CO2 SERPL-SCNC: 27 MMOL/L
COLOR UR AUTO: NORMAL
CREAT SERPL-MCNC: 0.7 MG/DL
DIFFERENTIAL METHOD: NORMAL
EOSINOPHIL # BLD AUTO: 0.1 K/UL
EOSINOPHIL NFR BLD: 1.6 %
ERYTHROCYTE [DISTWIDTH] IN BLOOD BY AUTOMATED COUNT: 12.2 %
EST. GFR  (AFRICAN AMERICAN): >60 ML/MIN/1.73 M^2
EST. GFR  (NON AFRICAN AMERICAN): >60 ML/MIN/1.73 M^2
GLUCOSE SERPL-MCNC: 182 MG/DL
GLUCOSE UR QL STRIP: NEGATIVE
HCT VFR BLD AUTO: 37.3 %
HGB BLD-MCNC: 12.3 G/DL
HGB UR QL STRIP: NEGATIVE
IMM GRANULOCYTES # BLD AUTO: 0.03 K/UL
IMM GRANULOCYTES NFR BLD AUTO: 0.4 %
INR PPP: 1
KETONES UR QL STRIP: NEGATIVE
LEUKOCYTE ESTERASE UR QL STRIP: NEGATIVE
LYMPHOCYTES # BLD AUTO: 2.1 K/UL
LYMPHOCYTES NFR BLD: 31.1 %
MAGNESIUM SERPL-MCNC: 2.1 MG/DL
MCH RBC QN AUTO: 30.1 PG
MCHC RBC AUTO-ENTMCNC: 33 G/DL
MCV RBC AUTO: 91 FL
MONOCYTES # BLD AUTO: 0.5 K/UL
MONOCYTES NFR BLD: 7.9 %
NEUTROPHILS # BLD AUTO: 4 K/UL
NEUTROPHILS NFR BLD: 58.3 %
NITRITE UR QL STRIP: NEGATIVE
NRBC BLD-RTO: 0 /100 WBC
PH UR STRIP: 5 [PH] (ref 5–8)
PLATELET # BLD AUTO: 309 K/UL
PMV BLD AUTO: 9.9 FL
POTASSIUM SERPL-SCNC: 3.8 MMOL/L
PROT SERPL-MCNC: 7.2 G/DL
PROT UR QL STRIP: NEGATIVE
PROTHROMBIN TIME: 10.9 SEC
RBC # BLD AUTO: 4.08 M/UL
SODIUM SERPL-SCNC: 135 MMOL/L
SP GR UR STRIP: 1.01 (ref 1–1.03)
URN SPEC COLLECT METH UR: NORMAL
UROBILINOGEN UR STRIP-ACNC: NEGATIVE EU/DL
WBC # BLD AUTO: 6.87 K/UL

## 2018-01-11 PROCEDURE — 85025 COMPLETE CBC W/AUTO DIFF WBC: CPT

## 2018-01-11 PROCEDURE — 93010 ELECTROCARDIOGRAM REPORT: CPT | Mod: ,,, | Performed by: INTERNAL MEDICINE

## 2018-01-11 PROCEDURE — 80053 COMPREHEN METABOLIC PANEL: CPT

## 2018-01-11 PROCEDURE — 83735 ASSAY OF MAGNESIUM: CPT

## 2018-01-11 PROCEDURE — 81003 URINALYSIS AUTO W/O SCOPE: CPT

## 2018-01-11 PROCEDURE — 85610 PROTHROMBIN TIME: CPT

## 2018-01-11 PROCEDURE — 99285 EMERGENCY DEPT VISIT HI MDM: CPT

## 2018-01-11 PROCEDURE — 99285 EMERGENCY DEPT VISIT HI MDM: CPT | Mod: ,,, | Performed by: EMERGENCY MEDICINE

## 2018-01-11 PROCEDURE — 93005 ELECTROCARDIOGRAM TRACING: CPT | Mod: 25

## 2018-01-12 DIAGNOSIS — G95.9 CERVICAL MYELOPATHY: Primary | ICD-10-CM

## 2018-01-12 PROBLEM — M48.00 SPINAL STENOSIS: Status: ACTIVE | Noted: 2018-01-12

## 2018-01-12 PROBLEM — R29.6 RECURRENT FALLS: Status: ACTIVE | Noted: 2018-01-12

## 2018-01-12 PROBLEM — F03.90 DEMENTIA: Status: ACTIVE | Noted: 2018-01-12

## 2018-01-12 LAB
ABO + RH BLD: NORMAL
BLD GP AB SCN CELLS X3 SERPL QL: NORMAL
POCT GLUCOSE: 164 MG/DL (ref 70–110)
POCT GLUCOSE: 198 MG/DL (ref 70–110)
POCT GLUCOSE: 217 MG/DL (ref 70–110)

## 2018-01-12 PROCEDURE — G8988 SELF CARE GOAL STATUS: HCPCS | Mod: CJ

## 2018-01-12 PROCEDURE — 97530 THERAPEUTIC ACTIVITIES: CPT

## 2018-01-12 PROCEDURE — 97161 PT EVAL LOW COMPLEX 20 MIN: CPT

## 2018-01-12 PROCEDURE — 94761 N-INVAS EAR/PLS OXIMETRY MLT: CPT

## 2018-01-12 PROCEDURE — 87086 URINE CULTURE/COLONY COUNT: CPT

## 2018-01-12 PROCEDURE — G0378 HOSPITAL OBSERVATION PER HR: HCPCS

## 2018-01-12 PROCEDURE — 97535 SELF CARE MNGMENT TRAINING: CPT | Mod: 59

## 2018-01-12 PROCEDURE — 86850 RBC ANTIBODY SCREEN: CPT

## 2018-01-12 PROCEDURE — 25000003 PHARM REV CODE 250: Performed by: INTERNAL MEDICINE

## 2018-01-12 PROCEDURE — 97165 OT EVAL LOW COMPLEX 30 MIN: CPT

## 2018-01-12 PROCEDURE — G8987 SELF CARE CURRENT STATUS: HCPCS | Mod: CK

## 2018-01-12 PROCEDURE — 99220 PR INITIAL OBSERVATION CARE,LEVL III: CPT | Mod: ,,, | Performed by: INTERNAL MEDICINE

## 2018-01-12 RX ORDER — LISINOPRIL 20 MG/1
40 TABLET ORAL DAILY
Status: DISCONTINUED | OUTPATIENT
Start: 2018-01-12 | End: 2018-01-13 | Stop reason: HOSPADM

## 2018-01-12 RX ORDER — LEVOTHYROXINE SODIUM 50 UG/1
50 TABLET ORAL
Status: DISCONTINUED | OUTPATIENT
Start: 2018-01-12 | End: 2018-01-13 | Stop reason: HOSPADM

## 2018-01-12 RX ORDER — CHOLECALCIFEROL (VITAMIN D3) 25 MCG
1000 TABLET ORAL DAILY
Status: DISCONTINUED | OUTPATIENT
Start: 2018-01-12 | End: 2018-01-12

## 2018-01-12 RX ORDER — AMLODIPINE BESYLATE 5 MG/1
5 TABLET ORAL DAILY
Status: DISCONTINUED | OUTPATIENT
Start: 2018-01-12 | End: 2018-01-13 | Stop reason: HOSPADM

## 2018-01-12 RX ORDER — CARVEDILOL 6.25 MG/1
6.25 TABLET ORAL 2 TIMES DAILY
Status: DISCONTINUED | OUTPATIENT
Start: 2018-01-12 | End: 2018-01-13 | Stop reason: HOSPADM

## 2018-01-12 RX ORDER — DEXTROMETHORPHAN HYDROBROMIDE, GUAIFENESIN 5; 100 MG/5ML; MG/5ML
1300 LIQUID ORAL EVERY 6 HOURS PRN
COMMUNITY

## 2018-01-12 RX ORDER — RAMELTEON 8 MG/1
8 TABLET ORAL NIGHTLY PRN
Status: DISCONTINUED | OUTPATIENT
Start: 2018-01-12 | End: 2018-01-13 | Stop reason: HOSPADM

## 2018-01-12 RX ORDER — IBUPROFEN 200 MG
16 TABLET ORAL
Status: DISCONTINUED | OUTPATIENT
Start: 2018-01-12 | End: 2018-01-13 | Stop reason: HOSPADM

## 2018-01-12 RX ORDER — INSULIN ASPART 100 [IU]/ML
0-5 INJECTION, SOLUTION INTRAVENOUS; SUBCUTANEOUS
Status: DISCONTINUED | OUTPATIENT
Start: 2018-01-12 | End: 2018-01-13 | Stop reason: HOSPADM

## 2018-01-12 RX ORDER — DEXTROSE 50 % IN WATER (D50W) INTRAVENOUS SYRINGE
12.5
Status: DISCONTINUED | OUTPATIENT
Start: 2018-01-12 | End: 2018-01-13 | Stop reason: HOSPADM

## 2018-01-12 RX ORDER — DEXTROSE 50 % IN WATER (D50W) INTRAVENOUS SYRINGE
25
Status: DISCONTINUED | OUTPATIENT
Start: 2018-01-12 | End: 2018-01-13 | Stop reason: HOSPADM

## 2018-01-12 RX ORDER — IBUPROFEN 200 MG
24 TABLET ORAL
Status: DISCONTINUED | OUTPATIENT
Start: 2018-01-12 | End: 2018-01-13 | Stop reason: HOSPADM

## 2018-01-12 RX ORDER — PRAVASTATIN SODIUM 40 MG/1
40 TABLET ORAL DAILY
Status: DISCONTINUED | OUTPATIENT
Start: 2018-01-12 | End: 2018-01-13 | Stop reason: HOSPADM

## 2018-01-12 RX ORDER — CHOLECALCIFEROL (VITAMIN D3) 25 MCG
2000 TABLET ORAL DAILY
Status: DISCONTINUED | OUTPATIENT
Start: 2018-01-13 | End: 2018-01-13 | Stop reason: HOSPADM

## 2018-01-12 RX ORDER — GLUCAGON 1 MG
1 KIT INJECTION
Status: DISCONTINUED | OUTPATIENT
Start: 2018-01-12 | End: 2018-01-13 | Stop reason: HOSPADM

## 2018-01-12 RX ADMIN — PRAVASTATIN SODIUM 40 MG: 40 TABLET ORAL at 09:01

## 2018-01-12 RX ADMIN — LEVOTHYROXINE SODIUM 50 MCG: 50 TABLET ORAL at 05:01

## 2018-01-12 RX ADMIN — VITAMIN D, TAB 1000IU (100/BT) 1000 UNITS: 25 TAB at 09:01

## 2018-01-12 RX ADMIN — CARVEDILOL 6.25 MG: 6.25 TABLET, FILM COATED ORAL at 09:01

## 2018-01-12 RX ADMIN — AMLODIPINE BESYLATE 5 MG: 5 TABLET ORAL at 09:01

## 2018-01-12 RX ADMIN — LISINOPRIL 40 MG: 20 TABLET ORAL at 09:01

## 2018-01-12 NOTE — PT/OT/SLP EVAL
Occupational Therapy   Evaluation    Name: Carin Nicholas  MRN: [id number]  Admitting Diagnosis:  Spinal stenosis      Recommendations:     Discharge Recommendations: nursing facility, skilled     Discharge Equipment Recommendations:   (TBD)     Barriers to discharge:  Decreased caregiver support, Inaccessible home environment    History:     Occupational Profile:  Living Environment: Pt lives alone in [facility] with no FRANCES  Previous level of function: (I)  Equipment Owned:  cane, straight  Assistance upon Discharge: limited. Son lives in [place]    Past Medical History:   Diagnosis Date    Arthritis     Diabetes mellitus, type 2     Hyperlipidemia     Hypertension     Hypothyroidism     Rheumatoid arthritis        Past Surgical History:   Procedure Laterality Date    APPENDECTOMY      HYSTERECTOMY         Subjective     Pt agreeable to Evaluation    Communicated with: RN prior to session.  Pain/Comfort:  · Pain Rating 1: 3/10  · Location 1: back  · Pain Addressed 1: Reposition, Distraction    Objective:     Patient found with: SCD, virtual sitter    General Precautions: Standard, fall     Occupational Performance:    Bed Mobility:    · Patient completed Scooting/Bridging with minimum assistance  · Patient completed Supine to Sit with minimum assistance    Functional Mobility/Transfers:  · Patient completed Sit <> Stand Transfer with minimum assistance  with  rolling walker   · Patient completed Toilet Transfer Stand Pivot technique with minimum assistance with  rolling walker   · Ambulated to bathroom with ModA with RW.     Activities of Daily Living:  · Grooming: minimum assistance to comb hair seated at sink  · UB Dressing: moderate assistance    · LB Dressing: minimum assistance doff/don B socks  · Toileting: moderate assistance      Cognitive/Visual Perceptual:  Cognitive/Psychosocial Skills:     -       Safety awareness/insight to disability: impaired     Physical Exam:  B UE: ROM limited at shoulders to 90*  "flexion. Strength grossly 4-/5.    Patient left up in chair with all lines intact, call button in reach, RN notified and virtual sitter present    [facility] 6 Click:  [facility] Total Score: 17  Education:    Assessment:     Carin Nicholas is a 88 y.o. female with a medical diagnosis of Spinal stenosis.  She presents with below deficits and would benefit from OT services to maximize functional (I) and safety. SNF recommended at this time as patient high fall risk at this time.      Performance deficits affecting function are weakness, impaired endurance, impaired self care skills, impaired functional mobilty, gait instability, impaired balance, impaired cognition, decreased lower extremity function, decreased upper extremity function, decreased safety awareness, pain.      Rehab Prognosis:  good; patient would benefit from acute skilled OT services to address these deficits and reach maximum level of function.         Clinical Decision Makin.  OT Low:  "Pt evaluation falls under low complexity for evaluation coding due to performance deficits noted in 1-3 areas as stated above and 0 co-morbities affecting current functional status. Data obtained from problem focused assessments. No modifications or assistance was required for completion of evaluation. Only brief occupational profile and history review completed."     Plan:     Patient to be seen 5 x/week to address the above listed problems via self-care/home management, therapeutic activities, therapeutic exercises  · Plan of Care Expires:    · Plan of Care Reviewed with: patient    This Plan of care has been discussed with the patient who was involved in its development and understands and is in agreement with the identified goals and treatment plan    GOALS:    Occupational Therapy Goals        Problem: Occupational Therapy Goal    Goal Priority Disciplines Outcome Interventions   Occupational Therapy Goal     OT, PT/OT     Description:  Goals to be met by: 7 " days    Patient will increase functional independence with ADLs by performing:    UE Dressing with Supervision.  LE Dressing with Supervision with AD as needed.  Grooming while standing with Supervision.  Toileting from bedside commode with Supervision for hygiene and clothing management.   Stand pivot transfers with Supervision.  Toilet transfer to bedside commode with Supervision.                         Time Tracking:     OT Date of Treatment: 01/12/18  OT Start Time: 1540  OT Stop Time: 1605  OT Total Time (min): 25 min    Billable Minutes:Evaluation 15  Self Care/Home Management 10    ROMAN Millan  1/12/2018

## 2018-01-12 NOTE — H&P
[facility] Medicine  History & Physical    Patient Name: Carin Nicholas  MRN: [id number]  Admission Date: 1/11/2018  Attending Physician: Janel Cuellar DO   Primary Care Provider: Arash Espinoza DO    [facility] Medicine Team: [facility] MED 4 Margoth Smith MD     Patient information was obtained from relative(s), past medical records and ER records.     Subjective:     Principal Problem:Spinal stenosis    Chief Complaint:   Chief Complaint   Patient presents with    Back Pain     back pain s/p trip and fall today. fell from a standing position.         HPI: Pt is a 88y F w PMHx of dementia, HTN, DM2, DJD presenting after a fall yesterday with hip pain and weakness. Pt is confused and does not recall why she is here. Obtained history from son who lives in [place], but states that he monitors his mother who lives by herself 24/7 via skype. Says the pt told him she got up from toilet and fell slowly to her side after attempting to grab on to the sink. He reports approx 3 falls in the past 2 weeks. Denies trauma to head, LOC, recent illness, fever, chills, N/V, diarrhea, new meds. Patient currently denies back or hip pain. She is complaining of fatigue, suprapubic discomfort, urinary frequency and incontinence.     In ED UA neg for UTI, EKG without acute ischemic changes. MRI without lumbar spine fracture, but shows severe canal stenosis. Evaluated by neurosurgery who does not think there is indication for emergent intervention.    Past Medical History:   Diagnosis Date    Arthritis     Diabetes mellitus, type 2     Hyperlipidemia     Hypertension     Hypothyroidism     Rheumatoid arthritis        Past Surgical History:   Procedure Laterality Date    APPENDECTOMY      HYSTERECTOMY         Review of patient's allergies indicates:  No Known Allergies    No current facility-administered medications on file prior to encounter.      Current Outpatient Prescriptions on File  "Prior to Encounter   Medication Sig    amlodipine (NORVASC) 5 MG tablet TAKE ONE TABLET BY MOUTH EVERY DAY    aspirin (ECOTRIN) 81 MG EC tablet Take 81 mg by mouth once daily.    blood sugar diagnostic Strp 1 strip by Misc.(Non-Drug; Combo Route) route 3 (three) times daily.    blood-glucose meter (FREESTYLE SYSTEM KIT) kit Use as instructed    carvedilol (COREG) 6.25 MG tablet Take 1 tablet (6.25 mg total) by mouth 2 (two) times daily.    folic acid-vit B6-vit B12 2.5-25-2 mg (FOLBIC OR EQUIV) 2.5-25-2 mg Tab Take 1 tablet by mouth once daily.    ibuprofen (ADVIL,MOTRIN) 600 MG tablet Take 1 tablet (600 mg total) by mouth every 6 (six) hours as needed for Pain.    insulin aspart protamine-insulin aspart (NOVOLOG 70/30) 100 unit/mL (70-30) InPn pen Inject 16 units into skin with breakfast and 7 units with dinner    lancets (ONETOUCH ULTRASOFT LANCETS) Misc 1 lancet by Misc.(Non-Drug; Combo Route) route 3 (three) times daily.    levothyroxine (SYNTHROID) 50 MCG tablet Take 1 tablet (50 mcg total) by mouth before breakfast.    lisinopril (PRINIVIL,ZESTRIL) 40 MG tablet Take 1 tablet (40 mg total) by mouth once daily.    mirabegron (MYRBETRIQ) 25 mg Tb24 ER tablet Take 1 tablet (25 mg total) by mouth once daily.    NOVOFINE 32 32 gauge x 1/4" Ndle     PREMARIN vaginal cream Use vaginally Every other day    senna-docusate 8.6-50 mg (PERICOLACE) 8.6-50 mg per tablet Take 1 tablet by mouth 2 (two) times daily.    vitamin D 1000 units Tab Take 2 tablets (2,000 Units total) by mouth once daily.     Family History     Problem Relation (Age of Onset)    Cancer Brother, Brother        Social History Main Topics    Smoking status: Never Smoker    Smokeless tobacco: Not on file    Alcohol use No    Drug use: No    Sexual activity: No     Review of Systems   Reason unable to perform ROS: hx obtained from son.   Constitutional: Positive for fatigue. Negative for activity change, appetite change, chills, " diaphoresis and fever.   HENT: Negative for sneezing and sore throat.    Eyes: Negative for visual disturbance.   Respiratory: Negative for cough, chest tightness and shortness of breath.    Cardiovascular: Negative for chest pain, palpitations and leg swelling.   Gastrointestinal: Negative for abdominal pain, blood in stool, constipation, diarrhea, nausea and vomiting.   Genitourinary: Positive for enuresis, frequency and urgency. Negative for dysuria, flank pain and hematuria.   Musculoskeletal: Positive for back pain and gait problem. Negative for arthralgias, myalgias and neck pain.   Skin: Negative for color change.   Neurological: Positive for weakness. Negative for dizziness, syncope and headaches.   Psychiatric/Behavioral: Positive for confusion. The patient is not nervous/anxious.      Objective:     Vital Signs (Most Recent):  Temp: 98.6 °F (37 °C) (01/11/18 1818)  Pulse: 82 (01/11/18 1818)  Resp: 18 (01/11/18 1818)  BP: (!) 146/58 (01/11/18 1818)  SpO2: 97 % (01/12/18 0048) Vital Signs (24h Range):  Temp:  [98.6 °F (37 °C)] 98.6 °F (37 °C)  Pulse:  [82] 82  Resp:  [18] 18  SpO2:  [97 %] 97 %  BP: (146)/(58) 146/58     Weight: 68 kg (150 lb)  Body mass index is 27.44 kg/m².    Physical Exam   Constitutional: She appears well-developed and well-nourished.   Pleasantly confused elderly female in no acute distress.   HENT:   Head: Normocephalic and atraumatic.   Mouth/Throat: Oropharynx is clear and moist. No oropharyngeal exudate.   Eyes: Conjunctivae and EOM are normal. Pupils are equal, round, and reactive to light.   Neck: Normal range of motion. Neck supple.   Cardiovascular: Normal rate, regular rhythm and normal heart sounds.    No murmur heard.  Pulmonary/Chest: Effort normal and breath sounds normal. No respiratory distress. She has no wheezes. She has no rales.   Abdominal: Soft. Bowel sounds are normal. She exhibits no distension. There is no tenderness.   Musculoskeletal: Normal range of motion.  She exhibits no edema or tenderness.   3/5 strength lower extremities.  Tenderness to palpation at lumbar spine and at rt hip area. No bruising or skin breakdown noted.   Neurological: She is alert. No cranial nerve deficit or sensory deficit. She exhibits normal muscle tone.   Oriented to person and place only.  At bedside during neurosx exam: normal rectal tone without saddle anesthesia.   Skin: Skin is warm. She is not diaphoretic.   Psychiatric: She has a normal mood and affect. Her behavior is normal.         CRANIAL NERVES     CN III, IV, VI   Pupils are equal, round, and reactive to light.  Extraocular motions are normal.        Significant Labs:   CBC:   Recent Labs  Lab 01/11/18 2114   WBC 6.87   HGB 12.3   HCT 37.3        CMP:   Recent Labs  Lab 01/11/18 2114   *   K 3.8      CO2 27   *   BUN 20   CREATININE 0.7   CALCIUM 10.1   PROT 7.2   ALBUMIN 3.8   BILITOT 0.4   ALKPHOS 86   AST 18   ALT 12   ANIONGAP 6*   EGFRNONAA >60.0     Urine Studies:   Recent Labs  Lab 01/11/18 2128   COLORU Straw   APPEARANCEUA Clear   PHUR 5.0   SPECGRAV 1.010   PROTEINUA Negative   GLUCUA Negative   KETONESU Negative   BILIRUBINUA Negative   OCCULTUA Negative   NITRITE Negative   UROBILINOGEN Negative   LEUKOCYTESUR Negative       Significant Imaging: I have reviewed and interpreted all pertinent imaging results/findings within the past 24 hours.    Assessment/Plan:     * Spinal stenosis    -MRI: significant multilevel stenosis worst L3-L5. No fracture.  -neuro surgery evaluated pt; appreciate assistance  -no indication for acute intervention and son not interested in sx  -NPO until discussed w neurosx staff in AM            Recurrent falls    -possibly 2/2 to spinal stenosis vs orthostatic hypotension vs gait instability (neuropathy, debility)  -check orthostatics- on multiple BP meds  -B12 wnl  -neurosx evaluated pt; appreciate assistance. F/up on further recs  -PT/OT            Dementia     -5/2017: RPR nonreactive, TSH/B12 wnl  -fall, delirium precautions  -son is POA  -PT/OT          Urge incontinence    -chronic problem, but complaining of suprapubic pain.  -takes mirabregron 25 mg PO qhs  -UA neg for UTI. Cx in process          Essential hypertension    -continue home norvasc, coreg and lisinopril          Type 2 diabetes mellitus with diabetic polyneuropathy    -A1C 8.9 5/2017; f/up repeat  -takes novolog 70/30 16U AM and 8U qhs   -SSI for now while NPO        Hypothyroidism    -continue synthroid          Hyperlipidemia    -pravastatin 40mg PO daily            VTE Risk Mitigation         Ordered     Place sequential compression device  Until discontinued      01/12/18 0141     Medium Risk of VTE  Once      01/12/18 0044     Place BENITEZ hose  Until discontinued      01/12/18 0044        NPO and holding chem dvt ppx until case discussed by neurosx resident and staff.   PT/OT; may need placement    Margoth Smith MD  Department of Hospital Medicine   [facility]

## 2018-01-12 NOTE — PLAN OF CARE
Attempted to screen pt for DCP needs, pt unable to answer questions. Left a PHN SN[facility] left at the pt's bedside for the son to review.      01/12/18 1210   Discharge Assessment   Assessment Type Discharge Planning Assessment       1217- Called pt's son Ron ([phone]) no answer, left a  MSG for him to return my call to discuss DCP needs.

## 2018-01-12 NOTE — ASSESSMENT & PLAN NOTE
-possibly 2/2 to spinal stenosis vs orthostatic hypotension vs gait instability (neuropathy, debility)  -check orthostatics- on multiple BP meds  -B12 wnl  -neurosx evaluated pt; appreciate assistance. F/up on further recs  -PT/OT

## 2018-01-12 NOTE — ED NOTES
Tha Nicholas, son, long term care giver and power of , [phone]. He lives in [place] and will be in town tomorrow night b/t 9-10. He states she has a very low tolerance to pain medication. He states pt has been complaining of increasing pain to right hip over the last few weeks. Son states she has had 3 falls in the last week.

## 2018-01-12 NOTE — HPI
Carin Nicholas is a 88 y.o. female who presents with complaints of back and hip pain s/p fall 1 day ago. She is confused, which limits the history. Neurosurgery is consulted given findings of severe spinal stenosis, worst at L3-L5, without acute fracture. On neurosurgical evaluation, she states that her hip pain is worse than her back pain. She is urinating frequently with post-void of 30 cc by bladder scanner. She has rectal tone. She was reportedly on ASA 81 per [facility] records. She lives alone with home health , and her son in [place] observes her continuously via [employer] video camera. He states that she frequently has UTIs and urinary incontinence. He also endorses a history of dementia and states that they are not interested in elective surgery.

## 2018-01-12 NOTE — ASSESSMENT & PLAN NOTE
-MRI: significant multilevel stenosis worst L3-L5. No fracture.  -neuro surgery evaluated pt; appreciate assistance  -no indication for acute intervention and son not interested in sx  - pending PT/OT recs for discharge home

## 2018-01-12 NOTE — PROGRESS NOTES
Care discussed with Dr. Farley.   MRI demonstrates severe spinal stenosis. On my exam, patient is moving LE spontaneously, 3-4/5 strength bilaterally. She refuses to attempt to stand. Unable to assess ambulation.   Nurse notes multiple episodes of urinary incontinence, patient states she didn't know she was peeing.   Discussed with neurosurgery, no need for emergent intervention.   Will admit to IM for further management.

## 2018-01-12 NOTE — PROGRESS NOTES
[facility] Medicine  Progress Note    Patient Name: Carin Nicholas  MRN: [id number]  Patient Class: OP- Observation   Admission Date: 1/11/2018  Length of Stay: 0 days  Attending Physician: Ted Alcantar MD  Primary Care Provider: Arash Espinoza [facility] Medicine Team: [facility] MED 4 Malia Mullins MD    Subjective:     Principal Problem:Spinal stenosis    HPI:  Pt is a 88y F w PMHx of dementia, HTN, DM2, DJD presenting after a fall yesterday with hip pain and weakness. Pt is confused and does not recall why she is here. Obtained history from son who lives in [place], but states that he monitors his mother who lives by herself 24/7 via skype. Says the pt told him she got up from toilet and fell slowly to her side after attempting to grab on to the sink. He reports approx 3 falls in the past 2 weeks. Denies trauma to head, LOC, recent illness, fever, chills, N/V, diarrhea, new meds. Patient currently denies back or hip pain. She is complaining of fatigue, suprapubic discomfort, urinary frequency and incontinence.     In ED UA neg for UTI, EKG without acute ischemic changes. MRI without lumbar spine fracture, but shows severe canal stenosis. Evaluated by neurosurgery who does not think there is indication for emergent intervention.    Hospital Course:  Patient was admitted on 1/11/18 to hospital medicine after having a fall and complaining of suprapubic pressure.  In ED, patient's VSS, and a UA was negative for UTI. Xrays of lumbar spine, sacrum, and coccyx were performed and were negative for fracture.  An MRI was also done that did not show any fracture but did show significant multilevel stenosis, worst at L3-L5. Her rectal tone was found to be normal.  Patient was evaluated by neurosurgery who does not think there is indication for emergent surgical intervention.  PT and OT are assessing patient today.     Interval History: NAEON. Denies any pain. PT/OT to see  patient today for further recs.     Review of Systems   Reason unable to perform ROS: hx obtained from son.   Constitutional: Positive for fatigue. Negative for activity change, appetite change, chills, diaphoresis and fever.   HENT: Negative for sneezing and sore throat.    Eyes: Negative for visual disturbance.   Respiratory: Negative for cough, chest tightness and shortness of breath.    Cardiovascular: Negative for chest pain, palpitations and leg swelling.   Gastrointestinal: Negative for abdominal pain, blood in stool, constipation, diarrhea, nausea and vomiting.   Genitourinary: Positive for enuresis, frequency and urgency. Negative for dysuria, flank pain and hematuria.   Musculoskeletal: Positive for back pain and gait problem. Negative for arthralgias, myalgias and neck pain.   Skin: Negative for color change.   Neurological: Positive for weakness. Negative for dizziness, syncope and headaches.   Psychiatric/Behavioral: Positive for confusion. The patient is not nervous/anxious.           Objective:     Vital Signs (Most Recent):  Temp: 96 °F (35.6 °C) (01/12/18 1526)  Pulse: 100 (01/12/18 1526)  Resp: 16 (01/12/18 1526)  BP: (!) 166/82 (01/12/18 1526)  SpO2: 95 % (01/12/18 1526) Vital Signs (24h Range):  Temp:  [95.6 °F (35.3 °C)-98.6 °F (37 °C)] 96 °F (35.6 °C)  Pulse:  [] 100  Resp:  [16-20] 16  SpO2:  [95 %-97 %] 95 %  BP: (130-166)/(58-88) 166/82     Weight: 68 kg (150 lb)  Body mass index is 27.44 kg/m².    Intake/Output Summary (Last 24 hours) at 01/12/18 1632  Last data filed at 01/12/18 0500   Gross per 24 hour   Intake              150 ml   Output                0 ml   Net              150 ml      Physical Exam   Constitutional: She appears well-developed and well-nourished.   Pleasantly confused elderly female in no acute distress.   HENT:   Head: Normocephalic and atraumatic.   Mouth/Throat: Oropharynx is clear and moist. No oropharyngeal exudate.   Eyes: Conjunctivae and EOM are normal.  Pupils are equal, round, and reactive to light.   Neck: Normal range of motion. Neck supple.   Cardiovascular: Normal rate, regular rhythm and normal heart sounds.    No murmur heard.  Pulmonary/Chest: Effort normal and breath sounds normal. No respiratory distress. She has no wheezes. She has no rales.   Abdominal: Soft. Bowel sounds are normal. She exhibits no distension. There is no tenderness.   Musculoskeletal: Normal range of motion. She exhibits no edema or tenderness.   Very mild enderness to palpation at lumbar spine and at rt hip area. No bruising or skin breakdown noted.   Neurological: She is alert. No cranial nerve deficit or sensory deficit. She exhibits normal muscle tone.   Oriented to person and place only.     Skin: Skin is warm. She is not diaphoretic.   Psychiatric: She has a normal mood and affect. Her behavior is normal.       Significant Labs:   CBC:   Recent Labs  Lab 01/11/18 2114   WBC 6.87   HGB 12.3   HCT 37.3        CMP:   Recent Labs  Lab 01/11/18 2114   *   K 3.8      CO2 27   *   BUN 20   CREATININE 0.7   CALCIUM 10.1   PROT 7.2   ALBUMIN 3.8   BILITOT 0.4   ALKPHOS 86   AST 18   ALT 12   ANIONGAP 6*   EGFRNONAA >60.0           Assessment/Plan:      * Spinal stenosis    -MRI: significant multilevel stenosis worst L3-L5. No fracture.  -neuro surgery evaluated pt; appreciate assistance  -no indication for acute intervention and son not interested in sx  - pending PT/OT recs for discharge home            Recurrent falls    -possibly 2/2 to spinal stenosis vs orthostatic hypotension vs gait instability (neuropathy, debility)  -check orthostatics- on multiple BP meds  -B12 wnl  -neurosx evaluated pt; appreciate assistance. F/up on further recs  -PT/OT            Dementia    -fall precautions  -son is POA  -PT/OT          Urge incontinence    -chronic problem, but complaining of suprapubic pain.  -takes mirabregron 25 mg PO qhs  -UA neg for UTI. Cx in process           Essential hypertension    -continue home norvasc, coreg and lisinopril          Type 2 diabetes mellitus with diabetic polyneuropathy    -A1C 8.9 5/2017; f/up repeat  -takes novolog 70/30 16U AM and 8U qhs   -SSI for now while NPO        Hypothyroidism    -continue synthroid          Hyperlipidemia    -pravastatin 40mg PO daily            VTE Risk Mitigation         Ordered     Place sequential compression device  Until discontinued      01/12/18 0141     Medium Risk of VTE  Once      01/12/18 0044     Place BENITEZ hose  Until discontinued      01/12/18 0044              Malia Mullins MD  Department of Hospital Medicine   [facility]

## 2018-01-12 NOTE — PLAN OF CARE
Problem: Physical Therapy Goal  Goal: Physical Therapy Goal  Goals to be met by: 18     Patient will increase functional independence with mobility by performin. Supine to sit with Modified Blanco  2. Sit to supine with Modified Blanco  3. Sit to stand transfer with Supervision  4. Bed to chair transfer with Supervision using Rolling Walker  5. Gait  x 100 feet with Contact Guard Assistance using Rolling Walker.   6. Lower extremity exercise program x 30 reps per handout, with independence    Outcome: Ongoing (interventions implemented as appropriate)  PT eval complete and POC initiated.

## 2018-01-12 NOTE — PHARMACY MED REC
"Admission Medication Reconciliation - Pharmacy Consult Note    The home medication history was taken by Jackelin Calvillo, Pharmacy Tech.  Based on information gathered and subsequent review by the clinical pharmacist, the items below may need attention.    You may go to "Admission" then "Reconcile Home Medications" tabs to review and/or act upon these items.        No issues noted with the medication reconciliation.          Please address this information as you see fit.  Feel free to contact us if you have any questions or require assistance.    Maria Guadalupe Graves, PharmD, [facility]  Internal Medicine Clinical Pharmacy Specialist  Spectra link: [phone]      Patient's prior to admission medication regimen was as follows:  Medication Sig    acetaminophen (TYLENOL) 650 MG TbSR Take 1,300 mg by mouth every 6 (six) hours as needed (pain).    amlodipine (NORVASC) 5 MG tablet TAKE ONE TABLET BY MOUTH EVERY DAY    aspirin (ECOTRIN) 81 MG EC tablet Take 81 mg by mouth once daily.    carvedilol (COREG) 6.25 MG tablet Take 1 tablet (6.25 mg total) by mouth 2 (two) times daily.    folic acid-vit B6-vit B12 2.5-25-2 mg (FOLBIC OR EQUIV) 2.5-25-2 mg Tab Take 1 tablet by mouth once daily.    insulin aspart protamine-insulin aspart (NOVOLOG 70/30) 100 unit/mL (70-30) InPn pen  Inject 16 units into skin with breakfast and 8 units with dinner)    levothyroxine (SYNTHROID) 50 MCG tablet Take 1 tablet (50 mcg total) by mouth before breakfast.    lisinopril (PRINIVIL,ZESTRIL) 40 MG tablet Take 1 tablet (40 mg total) by mouth once daily.    mirabegron (MYRBETRIQ) 25 mg Tb24 ER tablet Take 1 tablet (25 mg total) by mouth once daily.    senna-docusate 8.6-50 mg (PERICOLACE) 8.6-50 mg per tablet Take 1 tablet by mouth 2 (two) times daily. (Patient taking differently: Take 1 tablet by mouth every evening. )    vitamin D 1000 units Tab Take 2 tablets (2,000 Units total) by mouth once daily.         Please add appropriate    SmartPhrase " below:

## 2018-01-12 NOTE — ASSESSMENT & PLAN NOTE
Carin Nicholas is a 88 y.o. female who presents after a fall yesterday with complaint of hip and low back pain. She denies radicular symptoms in the lower extremities. MRI does demonstrate significant multilevel stenosis worst L3-L5. Rectal tone intact. Son is not interested in surgical intervention at this time.     --Recommend admission to medicine   --PT/OT/OOB  --No acute neurosurgical intervention   --We will be happy to reassess the patient should the family's care plan change WRT surgery or should the patient have any neuro-worsening (new weakness, urinary retention, saddle anesthesia, loss of rectal tone)

## 2018-01-12 NOTE — SUBJECTIVE & OBJECTIVE
"Past Medical History:   Diagnosis Date    Arthritis     Diabetes mellitus, type 2     Hyperlipidemia     Hypertension     Hypothyroidism     Rheumatoid arthritis        Past Surgical History:   Procedure Laterality Date    APPENDECTOMY      HYSTERECTOMY         Review of patient's allergies indicates:  No Known Allergies    No current facility-administered medications on file prior to encounter.      Current Outpatient Prescriptions on File Prior to Encounter   Medication Sig    amlodipine (NORVASC) 5 MG tablet TAKE ONE TABLET BY MOUTH EVERY DAY    aspirin (ECOTRIN) 81 MG EC tablet Take 81 mg by mouth once daily.    blood sugar diagnostic Strp 1 strip by Misc.(Non-Drug; Combo Route) route 3 (three) times daily.    blood-glucose meter (FREESTYLE SYSTEM KIT) kit Use as instructed    carvedilol (COREG) 6.25 MG tablet Take 1 tablet (6.25 mg total) by mouth 2 (two) times daily.    folic acid-vit B6-vit B12 2.5-25-2 mg (FOLBIC OR EQUIV) 2.5-25-2 mg Tab Take 1 tablet by mouth once daily.    ibuprofen (ADVIL,MOTRIN) 600 MG tablet Take 1 tablet (600 mg total) by mouth every 6 (six) hours as needed for Pain.    insulin aspart protamine-insulin aspart (NOVOLOG 70/30) 100 unit/mL (70-30) InPn pen Inject 16 units into skin with breakfast and 7 units with dinner    lancets (ONETOUCH ULTRASOFT LANCETS) Misc 1 lancet by Misc.(Non-Drug; Combo Route) route 3 (three) times daily.    levothyroxine (SYNTHROID) 50 MCG tablet Take 1 tablet (50 mcg total) by mouth before breakfast.    lisinopril (PRINIVIL,ZESTRIL) 40 MG tablet Take 1 tablet (40 mg total) by mouth once daily.    mirabegron (MYRBETRIQ) 25 mg Tb24 ER tablet Take 1 tablet (25 mg total) by mouth once daily.    NOVOFINE 32 32 gauge x 1/4" Ndle     PREMARIN vaginal cream Use vaginally Every other day    senna-docusate 8.6-50 mg (PERICOLACE) 8.6-50 mg per tablet Take 1 tablet by mouth 2 (two) times daily.    vitamin D 1000 units Tab Take 2 tablets (2,000 " Units total) by mouth once daily.     Family History     Problem Relation (Age of Onset)    Cancer Brother, Brother        Social History Main Topics    Smoking status: Never Smoker    Smokeless tobacco: Not on file    Alcohol use No    Drug use: No    Sexual activity: No     Review of Systems   Reason unable to perform ROS: hx obtained from son.   Constitutional: Positive for fatigue. Negative for activity change, appetite change, chills, diaphoresis and fever.   HENT: Negative for sneezing and sore throat.    Eyes: Negative for visual disturbance.   Respiratory: Negative for cough, chest tightness and shortness of breath.    Cardiovascular: Negative for chest pain, palpitations and leg swelling.   Gastrointestinal: Negative for abdominal pain, blood in stool, constipation, diarrhea, nausea and vomiting.   Genitourinary: Positive for enuresis, frequency and urgency. Negative for dysuria, flank pain and hematuria.   Musculoskeletal: Positive for back pain and gait problem. Negative for arthralgias, myalgias and neck pain.   Skin: Negative for color change.   Neurological: Positive for weakness. Negative for dizziness, syncope and headaches.   Psychiatric/Behavioral: Positive for confusion. The patient is not nervous/anxious.      Objective:     Vital Signs (Most Recent):  Temp: 98.6 °F (37 °C) (01/11/18 1818)  Pulse: 82 (01/11/18 1818)  Resp: 18 (01/11/18 1818)  BP: (!) 146/58 (01/11/18 1818)  SpO2: 97 % (01/12/18 0048) Vital Signs (24h Range):  Temp:  [98.6 °F (37 °C)] 98.6 °F (37 °C)  Pulse:  [82] 82  Resp:  [18] 18  SpO2:  [97 %] 97 %  BP: (146)/(58) 146/58     Weight: 68 kg (150 lb)  Body mass index is 27.44 kg/m².    Physical Exam   Constitutional: She appears well-developed and well-nourished.   Pleasantly confused elderly female in no acute distress.   HENT:   Head: Normocephalic and atraumatic.   Mouth/Throat: Oropharynx is clear and moist. No oropharyngeal exudate.   Eyes: Conjunctivae and EOM are  normal. Pupils are equal, round, and reactive to light.   Neck: Normal range of motion. Neck supple.   Cardiovascular: Normal rate, regular rhythm and normal heart sounds.    No murmur heard.  Pulmonary/Chest: Effort normal and breath sounds normal. No respiratory distress. She has no wheezes. She has no rales.   Abdominal: Soft. Bowel sounds are normal. She exhibits no distension. There is no tenderness.   Musculoskeletal: Normal range of motion. She exhibits no edema or tenderness.   3/5 strength lower extremities.  Tenderness to palpation at lumbar spine and at rt hip area. No bruising or skin breakdown noted.   Neurological: She is alert. No cranial nerve deficit or sensory deficit. She exhibits normal muscle tone.   Oriented to person and place only.  At bedside during neurosx exam: normal rectal tone without saddle anesthesia.   Skin: Skin is warm. She is not diaphoretic.   Psychiatric: She has a normal mood and affect. Her behavior is normal.         CRANIAL NERVES     CN III, IV, VI   Pupils are equal, round, and reactive to light.  Extraocular motions are normal.        Significant Labs:   CBC:   Recent Labs  Lab 01/11/18 2114   WBC 6.87   HGB 12.3   HCT 37.3        CMP:   Recent Labs  Lab 01/11/18 2114   *   K 3.8      CO2 27   *   BUN 20   CREATININE 0.7   CALCIUM 10.1   PROT 7.2   ALBUMIN 3.8   BILITOT 0.4   ALKPHOS 86   AST 18   ALT 12   ANIONGAP 6*   EGFRNONAA >60.0     Urine Studies:   Recent Labs  Lab 01/11/18 2128   COLORU Straw   APPEARANCEUA Clear   PHUR 5.0   SPECGRAV 1.010   PROTEINUA Negative   GLUCUA Negative   KETONESU Negative   BILIRUBINUA Negative   OCCULTUA Negative   NITRITE Negative   UROBILINOGEN Negative   LEUKOCYTESUR Negative       Significant Imaging: I have reviewed and interpreted all pertinent imaging results/findings within the past 24 hours.
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
## 2018-01-12 NOTE — PT/OT/SLP EVAL
Physical Therapy Evaluation    Patient Name:  Carin Nicholas   MRN:  [id number]    Recommendations:     Discharge Recommendations:  nursing facility, skilled   Discharge Equipment Recommendations:  (TBD)   Barriers to discharge: Decreased caregiver support    Assessment:     Carin Nicholas is a 88 y.o. female admitted with a medical diagnosis of Spinal stenosis.  She presents with the below impairments/functional limitations.  Pt tolerated evaluation well, but is not at her PLOF.  Pt requires increased assistance with ambulation and demonstrates decreased safety awareness.  Pt is a good candidate for skilled PT services to address the below deficits and to increase functional independence.      Rehab Prognosis: good; patient would benefit from acute skilled PT services to address these deficits and reach maximum level of function.      Rehab Identified impairments/functional limitations:   weakness, impaired endurance, impaired functional mobilty, gait instability, impaired balance, decreased lower extremity function, decreased ROM, edema, orthopedic precautions, impaired skin    Recent Surgery: * No surgery found *      Plan:     During this hospitalization, patient to be seen 5 x/week to address the above listed problems via gait training, therapeutic activities, therapeutic exercises, neuromuscular re-education  · Plan of Care Expires:  02/11/18   Plan of Care Reviewed with: patient    Subjective     Communicated with RN prior to session.  Patient found supine in bed upon PT entry to room, agreeable to evaluation.      Chief Complaint: none  Patient comments/goals: to return home safely    Pain/Comfort:  · Pain Rating 1: 3/10  · Location 1: back  · Pain Addressed 1: Reposition, Distraction  · Pain Rating Post-Intervention 1: 3/10    Patients cultural, spiritual, [employer]ec[employer]alism conflicts given the current situation: none noted    Living Environment:  Pt lives alone in [facility] with no FRANCES  Previous level of function:  (I)  Equipment Owned:  cane, straight  Assistance upon Discharge: limited. Son lives in [place]    Objective:     Patient found with: SCD (virtual sitter)     General Precautions: Standard, fall   Orthopedic Precautions:N/A   Braces: N/A     Exams:    Cognitive/Psychosocial Skills:     -       Oriented to: Person, Place and Time   -       Follows Commands/attention:Easily distracted and Follows two-step commands  -       Communication: clear/fluent  -       Memory: Impaired STM  -       Safety awareness/insight to disability: impaired     Physical Exams:  · Postural Exam:  Patient presented with the following abnormalities:    · -       Rounded shoulders  · Skin Integrity/Edema:      · -       Skin integrity: Visible skin intact  · -       Edema: None noted BLE  · RLE ROM: WFL  · RLE Strength: WFL  · LLE ROM: WFL  · LLE Strength: WFL    Functional Mobility:    Bed Mobility:    · Patient completed Scooting/Bridging with minimum assistance  · Patient completed Supine to Sit with minimum assistance     Transfers:  · Patient completed Sit <> Stand Transfer with minimum assistance  with  rolling walker   · Patient completed Toilet Transfer Stand Pivot technique with minimum assistance with  rolling walker    Gait:  Pt ambulated ~20 feet with Mod A.  Cueing given for RW management, gait sequencing, and to correct anterior lean.      AM-PAC 6 CLICK MOBILITY  Total Score:15     Therapeutic Activities and Exercises:  · PT evaluation performed.  · Pt educated on role of PT, safety with functional mobility.   · Assisted patient with toileting and grooming.      Pt safe to transfer with RN staff    Patient left up in chair with all lines intact, call button in reach and RN notified.    GOALS:    Physical Therapy Goals        Problem: Physical Therapy Goal    Goal Priority Disciplines Outcome Goal Variances Interventions   Physical Therapy Goal     PT/OT, PT Ongoing (interventions implemented as appropriate)     Description:   Goals to be met by: 18     Patient will increase functional independence with mobility by performin. Supine to sit with Modified [place]  2. Sit to supine with Modified [place]  3. Sit to stand transfer with Supervision  4. Bed to chair transfer with Supervision using Rolling Walker  5. Gait  x 100 feet with Contact Guard Assistance using Rolling Walker.   6. Lower extremity exercise program x 30 reps per handout, with independence                      History:     Past Medical History:   Diagnosis Date    Arthritis     Diabetes mellitus, type 2     Hyperlipidemia     Hypertension     Hypothyroidism     Rheumatoid arthritis        Past Surgical History:   Procedure Laterality Date    APPENDECTOMY      HYSTERECTOMY         Time Tracking:     PT Received On: 18  PT Start Time: 1540     PT Stop Time: 1605  PT Total Time (min): 25 min     Billable Minutes: Evaluation 15 and Therapeutic Activity 10      Nikolai Contreras, PT, DPT  2018   [phone]

## 2018-01-12 NOTE — SUBJECTIVE & OBJECTIVE
Interval History: NAEON. Denies any pain. PT/OT to see patient today for further recs.     Review of Systems   Reason unable to perform ROS: hx obtained from son.   Constitutional: Positive for fatigue. Negative for activity change, appetite change, chills, diaphoresis and fever.   HENT: Negative for sneezing and sore throat.    Eyes: Negative for visual disturbance.   Respiratory: Negative for cough, chest tightness and shortness of breath.    Cardiovascular: Negative for chest pain, palpitations and leg swelling.   Gastrointestinal: Negative for abdominal pain, blood in stool, constipation, diarrhea, nausea and vomiting.   Genitourinary: Positive for enuresis, frequency and urgency. Negative for dysuria, flank pain and hematuria.   Musculoskeletal: Positive for back pain and gait problem. Negative for arthralgias, myalgias and neck pain.   Skin: Negative for color change.   Neurological: Positive for weakness. Negative for dizziness, syncope and headaches.   Psychiatric/Behavioral: Positive for confusion. The patient is not nervous/anxious.           Objective:     Vital Signs (Most Recent):  Temp: 96 °F (35.6 °C) (01/12/18 1526)  Pulse: 100 (01/12/18 1526)  Resp: 16 (01/12/18 1526)  BP: (!) 166/82 (01/12/18 1526)  SpO2: 95 % (01/12/18 1526) Vital Signs (24h Range):  Temp:  [95.6 °F (35.3 °C)-98.6 °F (37 °C)] 96 °F (35.6 °C)  Pulse:  [] 100  Resp:  [16-20] 16  SpO2:  [95 %-97 %] 95 %  BP: (130-166)/(58-88) 166/82     Weight: 68 kg (150 lb)  Body mass index is 27.44 kg/m².    Intake/Output Summary (Last 24 hours) at 01/12/18 1632  Last data filed at 01/12/18 0500   Gross per 24 hour   Intake              150 ml   Output                0 ml   Net              150 ml      Physical Exam   Constitutional: She appears well-developed and well-nourished.   Pleasantly confused elderly female in no acute distress.   HENT:   Head: Normocephalic and atraumatic.   Mouth/Throat: Oropharynx is clear and moist. No  oropharyngeal exudate.   Eyes: Conjunctivae and EOM are normal. Pupils are equal, round, and reactive to light.   Neck: Normal range of motion. Neck supple.   Cardiovascular: Normal rate, regular rhythm and normal heart sounds.    No murmur heard.  Pulmonary/Chest: Effort normal and breath sounds normal. No respiratory distress. She has no wheezes. She has no rales.   Abdominal: Soft. Bowel sounds are normal. She exhibits no distension. There is no tenderness.   Musculoskeletal: Normal range of motion. She exhibits no edema or tenderness.   Very mild enderness to palpation at lumbar spine and at rt hip area. No bruising or skin breakdown noted.   Neurological: She is alert. No cranial nerve deficit or sensory deficit. She exhibits normal muscle tone.   Oriented to person and place only.     Skin: Skin is warm. She is not diaphoretic.   Psychiatric: She has a normal mood and affect. Her behavior is normal.       Significant Labs:   CBC:   Recent Labs  Lab 01/11/18 2114   WBC 6.87   HGB 12.3   HCT 37.3        CMP:   Recent Labs  Lab 01/11/18  2114   *   K 3.8      CO2 27   *   BUN 20   CREATININE 0.7   CALCIUM 10.1   PROT 7.2   ALBUMIN 3.8   BILITOT 0.4   ALKPHOS 86   AST 18   ALT 12   ANIONGAP 6*   EGFRNONAA >60.0

## 2018-01-12 NOTE — ED PROVIDER NOTES
Encounter Date: 1/11/2018    SCRIBE #1 NOTE: I, Johanne Hdz, am scribing for, and in the presence of,  Dr. Farley. I have scribed the entire note.       History     Chief Complaint   Patient presents with    Back Pain     back pain s/p trip and fall today. fell from a standing position.      Pt reports acute weakness in the lower extremities with a fall on her buttocks that occurred yesterday.  Since then she has been unable to move her legs.  Denies urinary incontinence, but has had to have help with urination as she is unable to ambulate.  She reports low back pain.  Denies any other complaints.  She states that her lower back pain is constant and exacerbated by movement and palpation.  She states that it is moderate in severity.      The history is provided by the patient and medical records.     Review of patient's allergies indicates:  No Known Allergies  Past Medical History:   Diagnosis Date    Arthritis     Diabetes mellitus, type 2     Hyperlipidemia     Hypertension     Hypothyroidism     Rheumatoid arthritis      Past Surgical History:   Procedure Laterality Date    APPENDECTOMY      HYSTERECTOMY       Family History   Problem Relation Age of Onset    Cancer Brother      lung cancer    Cancer Brother      leg cancer     Social History   Substance Use Topics    Smoking status: Never Smoker    Smokeless tobacco: Never Used    Alcohol use No     Review of Systems   Constitutional: Negative for chills and fever.   HENT: Negative for ear pain and nosebleeds.    Eyes: Negative for photophobia and pain.   Respiratory: Negative for shortness of breath and wheezing.    Cardiovascular: Negative for chest pain.   Gastrointestinal: Negative for abdominal pain and blood in stool.   Genitourinary: Negative for dysuria and hematuria.   Musculoskeletal: Positive for back pain (lower) and gait problem.   Skin: Negative for rash.   Neurological: Negative for speech difficulty and headaches.       Physical Exam      Initial Vitals [01/11/18 1818]   BP Pulse Resp Temp SpO2   (!) 146/58 82 18 98.6 °F (37 °C) 97 %      MAP       87.33         Physical Exam    Nursing note and vitals reviewed.  Constitutional: She appears well-developed and well-nourished. No distress.   HENT:   Head: Normocephalic and atraumatic.   Cardiovascular: Normal rate, regular rhythm, normal heart sounds and intact distal pulses. Exam reveals no gallop and no friction rub.    No murmur heard.  Pulmonary/Chest: Breath sounds normal. No respiratory distress.   Abdominal:   No pulsations, bruits, tenderness, guarding or rebound.   Musculoskeletal:   Brisk capillary refills.  No tenderness.  Pelvis stable.  Tenderness is noted to the midline lumbar and sacral area without crepitus or step offs.   Neurological: She is alert and oriented to person, place, and time.   2/5 strength in the BLE.  No obvious sensory deficits.  No saddle anesthesia.   Skin: Skin is warm. No rash noted.   Psychiatric: She has a normal mood and affect. Her behavior is normal. Judgment and thought content normal.         ED Course   Procedures  Labs Reviewed   COMPREHENSIVE METABOLIC PANEL - Abnormal; Notable for the following:        Result Value    Sodium 135 (*)     Glucose 182 (*)     Anion Gap 6 (*)     All other components within normal limits   CBC W/ AUTO DIFFERENTIAL   URINALYSIS, REFLEX TO URINE CULTURE    Narrative:     Preferred Collection Type->Urine, Clean Catch  CUP   MAGNESIUM   PROTIME-INR   TYPE & SCREEN             Medical Decision Making:   History:   Old Medical Records: I decided to obtain old medical records.  Initial Assessment:   This is an emergent evaluation.  Differential includes electrolyte derangement, lumbar and sacral spine injury, spinal cord injury, and dehydration.  Laboratory studies, UA, lumbar x-ray, and an MRI have been ordered.  Independently Interpreted Test(s):   I have ordered and independently interpreted X-rays - see prior notes.  I have  ordered and independently interpreted EKG Reading(s) - see prior notes  Clinical Tests:   Lab Tests: Ordered and Reviewed  Radiological Study: Ordered and Reviewed  Medical Tests: Ordered and Reviewed            Scribe Attestation:   Scribe #1: I performed the above scribed service and the documentation accurately describes the services I performed. I attest to the accuracy of the note.    Attending Attestation:             Attending ED Notes:   10:12 PM The pt's laboratory studies show no clinically significant abnormalities.  Lumbar and sacral x-rays show no obvious acute injuries.  MRI is pending.    Continuation: As the patient is unable to ambulate, she has been admitted to hospital medicine for further workup.  MRI is pending.  She is resting comfortably at this time.          ED Course      Clinical Impression:   The primary encounter diagnosis was Spinal stenosis, unspecified spinal region. Diagnoses of Lower extremity weakness, Lower back injury, initial encounter, Urinary incontinence, unspecified type, and Spinal stenosis of lumbar region, unspecified whether neurogenic claudication present were also pertinent to this visit.    Disposition:   Disposition: Admitted                        Foreign Farley MD  01/29/18 0951

## 2018-01-12 NOTE — ASSESSMENT & PLAN NOTE
-chronic problem, but complaining of suprapubic pain.  -takes mirabregron 25 mg PO qhs  -UA neg for UTI. Cx in process

## 2018-01-12 NOTE — ASSESSMENT & PLAN NOTE
-MRI: significant multilevel stenosis worst L3-L5. No fracture.  -neuro surgery evaluated pt; appreciate assistance  -no indication for acute intervention and son not interested in sx  -NPO until discussed w neurosx staff in AM

## 2018-01-12 NOTE — PLAN OF CARE
Problem: Occupational Therapy Goal  Goal: Occupational Therapy Goal  Goals to be met by: 7 days    Patient will increase functional independence with ADLs by performing:    UE Dressing with Supervision.  LE Dressing with Supervision with AD as needed.  Grooming while standing with Supervision.  Toileting from bedside commode with Supervision for hygiene and clothing management.   Stand pivot transfers with Supervision.  Toilet transfer to bedside commode with Supervision.       ROMAN Millan  1/12/2018

## 2018-01-12 NOTE — ED NOTES
Pt presents s/p fall yesterday. Pt states she fell and landed on her bottom. Pt states she was unable to move after fall and can no longer ambulate. Pt reports weakness and pain, denies incontinence. Pt states she is able to use a bedpan. Pt abdomen is tender to palpation and pt is unable to sit up w/o assistance. Pt denies loss of sensation. She states this has never happened to her before.

## 2018-01-12 NOTE — ED NOTES
Assumed care at this time. Pt very anxious stating she has to go to the restroom constantly.  Pt having urinary frequency at this time and incontinence.

## 2018-01-12 NOTE — HOSPITAL COURSE
Patient was admitted on 1/11/18 to hospital medicine after having a fall and complaining of suprapubic pressure.  In ED, patient's VSS, and a UA was negative for UTI. Xrays of lumbar spine, sacrum, and coccyx were performed and were negative for fracture.  An MRI was also done that did not show any fracture but did show significant multilevel stenosis, worst at L3-L5. Her rectal tone was found to be normal.  Patient was evaluated by neurosurgery who does not think there is indication for emergent surgical intervention.  PT and OT recommend SNF, however patient unlikely to benefit in the long run.  On 1/14/18, patient's son came to the hospital to discuss the future plans and goals for his mother's care.  He lives in [place] and eventually wants her moved there once he gets her new insurance.  In the meantime, we discussed home health PT, OT, nursing, and social work for the immediate future, and he was given resources to contact companies that provide 24/7 nursing aids.  Patient was stable for discharge home with the son and all questions about her care were answered.

## 2018-01-12 NOTE — SUBJECTIVE & OBJECTIVE
(Not in a hospital admission)    Review of patient's allergies indicates:  No Known Allergies    Past Medical History:   Diagnosis Date    Arthritis     Diabetes mellitus, type 2     Hyperlipidemia     Hypertension     Hypothyroidism     Rheumatoid arthritis      Past Surgical History:   Procedure Laterality Date    APPENDECTOMY      HYSTERECTOMY       Family History     Problem Relation (Age of Onset)    Cancer Brother, Brother        Social History Main Topics    Smoking status: Never Smoker    Smokeless tobacco: Not on file    Alcohol use No    Drug use: No    Sexual activity: No     Review of Systems   14-point review of systems completed and otherwise negative except as noted in HPI.     Objective:     Weight: 68 kg (150 lb)  Body mass index is 27.44 kg/m².  Vital Signs (Most Recent):  Temp: 98.6 °F (37 °C) (01/11/18 1818)  Pulse: 82 (01/11/18 1818)  Resp: 18 (01/11/18 1818)  BP: (!) 146/58 (01/11/18 1818)  SpO2: 97 % (01/12/18 0048) Vital Signs (24h Range):  Temp:  [98.6 °F (37 °C)] 98.6 °F (37 °C)  Pulse:  [82] 82  Resp:  [18] 18  SpO2:  [97 %] 97 %  BP: (146)/(58) 146/58                           Neurosurgery Physical Exam  Awake, alert, oriented to person   Pleasant and interactive but confused  CN II-XII grossly intact   Moves all extremities with good strength and tone   She does not consistently participate in strength exam, but with coaxing, there are no focal lower extremity deficits  She endorses sensation to light touch throughout   Exam of back is notable for scoliosis and a subcutaneous bump in the thoracic region (possible lipoma?)  She is nontender to palpation along the entire spinal axis   No step-offs   Reflexes are symmetric and non-pathologic   Rectal tone present   Endorses normal perirectal and vaginal sensation     Significant Labs:    Recent Labs  Lab 01/11/18  2114   *   *   K 3.8      CO2 27   BUN 20   CREATININE 0.7   CALCIUM 10.1   MG 2.1        Recent Labs  Lab 01/11/18 2114   WBC 6.87   HGB 12.3   HCT 37.3          Recent Labs  Lab 01/11/18 2114   INR 1.0     Microbiology Results (last 7 days)     ** No results found for the last 168 hours. **          Significant Diagnostics:  MRI and Xrays personally reviewed  Significant dextroscoliosis   No acute fracture appreciated   Significant stenosis worst L3-L5   Some STIR change in L3-L4 and L4-L5 discs

## 2018-01-12 NOTE — HPI
Pt is a 88y F w PMHx of dementia, HTN, DM2, DJD presenting after a fall yesterday with hip pain and weakness. Pt is confused and does not recall why she is here. Obtained history from son who lives in [place], but states that he monitors his mother who lives by herself 24/7 via skype. Says the pt told him she got up from toilet and fell slowly to her side after attempting to grab on to the sink. He reports approx 3 falls in the past 2 weeks. Denies trauma to head, LOC, recent illness, fever, chills, N/V, diarrhea, new meds. Patient currently denies back or hip pain. She is complaining of fatigue, suprapubic discomfort, urinary frequency and incontinence.     In ED UA neg for UTI, EKG without acute ischemic changes. MRI without lumbar spine fracture, but shows severe canal stenosis. Evaluated by neurosurgery who does not think there is indication for emergent intervention.

## 2018-01-12 NOTE — PLAN OF CARE
Problem: Patient Care Overview  Goal: Plan of Care Review  Patient progressing towards goals. Pain controlled  With  medsNeurovascularly  Intact. Blood glucose monitored. Bed in low position and call bell in reach.

## 2018-01-13 VITALS
RESPIRATION RATE: 16 BRPM | SYSTOLIC BLOOD PRESSURE: 144 MMHG | OXYGEN SATURATION: 97 % | WEIGHT: 150 LBS | BODY MASS INDEX: 27.6 KG/M2 | HEART RATE: 111 BPM | HEIGHT: 62 IN | TEMPERATURE: 97 F | DIASTOLIC BLOOD PRESSURE: 95 MMHG

## 2018-01-13 LAB
BACTERIA UR CULT: NORMAL
POCT GLUCOSE: 198 MG/DL (ref 70–110)
POCT GLUCOSE: 208 MG/DL (ref 70–110)

## 2018-01-13 PROCEDURE — G0378 HOSPITAL OBSERVATION PER HR: HCPCS

## 2018-01-13 PROCEDURE — 99217 PR OBSERVATION CARE DISCHARGE: CPT | Mod: ,,, | Performed by: INTERNAL MEDICINE

## 2018-01-13 PROCEDURE — 25000003 PHARM REV CODE 250: Performed by: INTERNAL MEDICINE

## 2018-01-13 RX ORDER — INSULIN ASPART 100 [IU]/ML
8 INJECTION, SOLUTION INTRAVENOUS; SUBCUTANEOUS
Status: DISCONTINUED | OUTPATIENT
Start: 2018-01-13 | End: 2018-01-13 | Stop reason: HOSPADM

## 2018-01-13 RX ORDER — INSULIN ASPART 100 [IU]/ML
4 INJECTION, SOLUTION INTRAVENOUS; SUBCUTANEOUS
Status: DISCONTINUED | OUTPATIENT
Start: 2018-01-13 | End: 2018-01-13 | Stop reason: HOSPADM

## 2018-01-13 RX ORDER — CELECOXIB 100 MG/1
100 CAPSULE ORAL 2 TIMES DAILY
Status: DISCONTINUED | OUTPATIENT
Start: 2018-01-13 | End: 2018-01-13 | Stop reason: HOSPADM

## 2018-01-13 RX ORDER — CELECOXIB 100 MG/1
100 CAPSULE ORAL 2 TIMES DAILY PRN
Qty: 30 CAPSULE | Refills: 0 | Status: ON HOLD | OUTPATIENT
Start: 2018-01-13 | End: 2018-01-29 | Stop reason: HOSPADM

## 2018-01-13 RX ADMIN — CARVEDILOL 6.25 MG: 6.25 TABLET, FILM COATED ORAL at 08:01

## 2018-01-13 RX ADMIN — PRAVASTATIN SODIUM 40 MG: 40 TABLET ORAL at 08:01

## 2018-01-13 RX ADMIN — VITAMIN D, TAB 1000IU (100/BT) 2000 UNITS: 25 TAB at 08:01

## 2018-01-13 RX ADMIN — AMLODIPINE BESYLATE 5 MG: 5 TABLET ORAL at 08:01

## 2018-01-13 RX ADMIN — LISINOPRIL 40 MG: 20 TABLET ORAL at 08:01

## 2018-01-13 NOTE — NURSING
IM team 4 paged w/ return call received and intern st he will visit shortly. Pt w/ change in mentation and worsening confusion. Patient attempting to get out of bed w/ avasys camera alerting to patient climbing out of be. Patient re-oriented and and safety awareness reviewed, pt becoming more agitated.

## 2018-01-13 NOTE — ASSESSMENT & PLAN NOTE
-fall precautions  -son is POA and understands that patient is heading towards needing more assistance in caring for self  - Will have HH PT/OT, nursing, and social work upon discharge  - Son states that he will start the process of switching [place] so that she can move to [place], where he lives

## 2018-01-13 NOTE — PROGRESS NOTES
Pt refusing AM meds. Informed pt of /88 and importance of taking medication. Pt refuses to take medication. IM4 paged. Dr. Fry will come see pt.

## 2018-01-13 NOTE — DISCHARGE SUMMARY
[facility] Hospital Medicine  Discharge Summary      Patient Name: Carin Nicholas  MRN: [id number]  Admission Date: 1/11/2018  Hospital Length of Stay: 0 days  Discharge Date and Time:  01/13/2018 1:08 PM  Attending Physician: Ted Alcantar MD   Discharging Provider: Malia Mullins MD  Primary Care Provider: Arash Espinoza DO  [facility] Medicine Team: [facility] 4 Malia Mullins MD    HPI:   Pt is a 88y F w PMHx of dementia, HTN, DM2, DJD presenting after a fall yesterday with hip pain and weakness. Pt is confused and does not recall why she is here. Obtained history from son who lives in [place], but states that he monitors his mother who lives by herself 24/7 via skype. Says the pt told him she got up from toilet and fell slowly to her side after attempting to grab on to the sink. He reports approx 3 falls in the past 2 weeks. Denies trauma to head, LOC, recent illness, fever, chills, N/V, diarrhea, new meds. Patient currently denies back or hip pain. She is complaining of fatigue, suprapubic discomfort, urinary frequency and incontinence.     In ED UA neg for UTI, EKG without acute ischemic changes. MRI without lumbar spine fracture, but shows severe canal stenosis. Evaluated by neurosurgery who does not think there is indication for emergent intervention.    * No surgery found *      Hospital Course:   Patient was admitted on 1/11/18 to hospital medicine after having a fall and complaining of suprapubic pressure.  In ED, patient's VSS, and a UA was negative for UTI. Xrays of lumbar spine, sacrum, and coccyx were performed and were negative for fracture.  An MRI was also done that did not show any fracture but did show significant multilevel stenosis, worst at L3-L5. Her rectal tone was found to be normal.  Patient was evaluated by neurosurgery who does not think there is indication for emergent surgical intervention.  PT and OT recommend SNF, however patient unlikely to  benefit in the long run.  On 1/14/18, patient's son came to the hospital to discuss the future plans and goals for his mother's care.  He lives in [place] and eventually wants her moved there once he gets her new insurance.  In the meantime, we discussed home health PT, OT, nursing, and social work for the immediate future, and he was given resources to contact companies that provide 24/7 nursing aids.  Patient was stable for discharge home with the son and all questions about her care were answered.      Patient was seen and examined on day of discharge.  Vital signs reviewed and exam as follows:  Physical Exam   Constitutional: She appears well-developed and well-nourished.   Pleasantly confused elderly female in no acute distress.   HENT:   Head: Normocephalic and atraumatic.   Mouth/Throat: Oropharynx is clear and moist. No oropharyngeal exudate.   Eyes: Conjunctivae and EOM are normal. Pupils are equal, round, and reactive to light.   Neck: Normal range of motion. Neck supple.   Cardiovascular: Normal rate, regular rhythm and normal heart sounds.    No murmur heard.  Pulmonary/Chest: Effort normal and breath sounds normal. No respiratory distress. She has no wheezes. She has no rales.   Abdominal: Soft. Bowel sounds are normal. She exhibits no distension. There is no tenderness.   Musculoskeletal: Normal range of motion. She exhibits no edema or tenderness.   Very mild enderness to palpation at lumbar spine and at rt hip area. No bruising or skin breakdown noted.   Neurological: She is alert. No cranial nerve deficit or sensory deficit. She exhibits normal muscle tone.   Oriented to person and place only.  Skin: Skin is warm. She is not diaphoretic.     Consults:   Consults         Status Ordering Provider     Inpatient consult to Neurosurgery  Once     Provider:  (Not yet assigned)    Acknowledged KAREEN FRANCISCO          * Spinal stenosis    -MRI: significant multilevel stenosis worst L3-L5. No  fracture.  -neuro surgery evaluated pt; appreciate assistance  -no indication for acute intervention and son not interested in sx  - D/c-ed home with HH PT/OT            Recurrent falls    -possibly 2/2 to spinal stenosis vs orthostatic hypotension vs gait instability (neuropathy, debility)  -B12 wnl  -neurosx evaluated pt and no surgical intervention at this time  -PT/OT            Dementia    -fall precautions  -son is POA and understands that patient is heading towards needing more assistance in caring for self  - Will have HH PT/OT, nursing, and social work upon discharge  - Son states that he will start the process of switching [place] so that she can move to [place], where he lives          Urge incontinence    -chronic problem, but complaining of suprapubic pain.  -takes mirabregron 25 mg PO qhs  -UA neg for UTI. Cx NG          Essential hypertension    -continue home norvasc, coreg and lisinopril          Type 2 diabetes mellitus with diabetic polyneuropathy    -A1C 8.9 5/2017; continue novolog 70/30 16U AM and 8U qhs           Hypothyroidism    -continue synthroid         Final Active Diagnoses:    Diagnosis Date Noted POA    PRINCIPAL PROBLEM:  Spinal stenosis [M48.00] 01/12/2018 Yes    Dementia [F03.90] 01/12/2018 Yes    Recurrent falls [R29.6] 01/12/2018 Not Applicable    Urge incontinence [N39.41] 11/09/2016 Yes    Essential hypertension [I10] 09/26/2016 Yes     Chronic    Type 2 diabetes mellitus with diabetic polyneuropathy [E11.42] 11/24/2015 Yes     Chronic    Hypothyroidism [E03.9] 11/19/2014 Yes     Chronic    Hyperlipidemia [E78.5] 11/19/2014 Yes     Chronic      Problems Resolved During this Admission:    Diagnosis Date Noted Date Resolved POA       Discharged Condition: stable    Disposition: Home-Health Care [facility]    Follow Up:  Follow-up Information     Arash Espinoza DO In 1 week.    Specialty:  Internal Medicine  Contact information:  [address] [facility] [facility] [address] [facility]  [place] [address]  [phone]                 Patient Instructions:     Diet diabetic     Notify your health care provider if you experience any of the following:  temperature >100.4     Notify your health care provider if you experience any of the following:  severe uncontrolled pain     Notify your health care provider if you experience any of the following:  redness, tenderness, or signs of infection (pain, swelling, redness, odor or green/yellow discharge around incision site)     Notify your health care provider if you experience any of the following:  difficulty breathing or increased cough     Notify your health care provider if you experience any of the following:  increased confusion or weakness     Notify your health care provider if you experience any of the following:  persistent dizziness, light-headedness, or visual disturbances         Significant Diagnostic Studies: Labs:   CMP   Recent Labs  Lab 01/11/18 2114   *   K 3.8      CO2 27   *   BUN 20   CREATININE 0.7   CALCIUM 10.1   PROT 7.2   ALBUMIN 3.8   BILITOT 0.4   ALKPHOS 86   AST 18   ALT 12   ANIONGAP 6*   ESTGFRAFRICA >60.0   EGFRNONAA >60.0   , CBC   Recent Labs  Lab 01/11/18 2114   WBC 6.87   HGB 12.3   HCT 37.3       and INR   Lab Results   Component Value Date    INR 1.0 01/11/2018    INR 1.0 05/23/2017       Pending Diagnostic Studies:     None         Medications:  Reconciled Home Medications:   Current Discharge Medication List      START taking these medications    Details   celecoxib (CELEBREX) 100 MG capsule Take 1 capsule (100 mg total) by mouth 2 (two) times daily as needed for Pain.  Qty: 30 capsule, Refills: 0         CONTINUE these medications which have NOT CHANGED    Details   acetaminophen (TYLENOL) 650 MG TbSR Take 1,300 mg by mouth every 6 (six) hours as needed (pain).      amlodipine (NORVASC) 5 MG tablet TAKE ONE TABLET BY MOUTH EVERY DAY  Qty: 90 tablet, Refills: 3      blood sugar  "diagnostic Strp 1 strip by Misc.(Non-Drug; Combo Route) route 3 (three) times daily.  Qty: 200 strip, Refills: 11    Comments: For True Metrix meter      blood-glucose meter (FREESTYLE SYSTEM KIT) kit Use as instructed  Qty: 1 each, Refills: 0    Comments: True Metrix meter      carvedilol (COREG) 6.25 MG tablet Take 1 tablet (6.25 mg total) by mouth 2 (two) times daily.  Qty: 60 tablet, Refills: 5      folic acid-vit B6-vit B12 2.5-25-2 mg (FOLBIC OR EQUIV) 2.5-25-2 mg Tab Take 1 tablet by mouth once daily.  Qty: 30 tablet, Refills: 5      insulin aspart protamine-insulin aspart (NOVOLOG 70/30) 100 unit/mL (70-30) InPn pen Inject 16 units into skin with breakfast and 7 units with dinner  Qty: 1 Box, Refills: 5      lancets (ONETOUCH ULTRASOFT LANCETS) Misc 1 lancet by Misc.(Non-Drug; Combo Route) route 3 (three) times daily.  Qty: 200 each, Refills: 11    Comments: For True Metrix meter      levothyroxine (SYNTHROID) 50 MCG tablet Take 1 tablet (50 mcg total) by mouth before breakfast.  Qty: 90 tablet, Refills: 1      lisinopril (PRINIVIL,ZESTRIL) 40 MG tablet Take 1 tablet (40 mg total) by mouth once daily.  Qty: 90 tablet, Refills: 0      mirabegron (MYRBETRIQ) 25 mg Tb24 ER tablet Take 1 tablet (25 mg total) by mouth once daily.  Qty: 90 tablet, Refills: 4    Associated Diagnoses: Urge incontinence; Urinary urgency      NOVOFINE 32 32 gauge x 1/4" Ndle Use twice daily      senna-docusate 8.6-50 mg (PERICOLACE) 8.6-50 mg per tablet Take 1 tablet by mouth 2 (two) times daily.      vitamin D 1000 units Tab Take 2 tablets (2,000 Units total) by mouth once daily.         STOP taking these medications       aspirin (ECOTRIN) 81 MG EC tablet Comments:   Reason for Stopping:               Indwelling Lines/Drains at time of discharge:   Lines/Drains/Airways          No matching active lines, drains, or airways          Time spent on the discharge of patient: >30 minutes  Patient was seen and examined on the date of " discharge and determined to be suitable for discharge.         Malia Mullins MD  Department of Hospital Medicine  [facility]

## 2018-01-13 NOTE — ASSESSMENT & PLAN NOTE
-chronic problem, but complaining of suprapubic pain.  -takes mirabregron 25 mg PO qhs  -UA neg for UTI. Cx NG

## 2018-01-13 NOTE — ASSESSMENT & PLAN NOTE
-possibly 2/2 to spinal stenosis vs orthostatic hypotension vs gait instability (neuropathy, debility)  -B12 wnl  -neurosx evaluated pt and no surgical intervention at this time  -PT/OT

## 2018-01-13 NOTE — PLAN OF CARE
1300- Recv'd a call from Dr. Mullisn stating she has spoken with the pt's son and they are OK with HH for the pt to d/c today. She stated her son Ron ([phone]) arrived from [place], will be staying with the pt, and eventually moving her to [place] with him. Dr. Mullins aware PT/OT rec SNF, she stated the pt's son is also aware, and is looking into that for the pt in [place]. At this time he would like to take her home to her apt here, he will be with her, would like HH and sitter services arranged. Senior resource guide given to arrange sitter services and advised will arrange through her health insurance [employer].     1303- Called N, spoke to the answering service, advised pt needs HH to d/c today. She stated she will have the on call nurse call me back.     1307- HH orders and H&P faxed to [facility] ([phone]) with fax confirmation e-mail returned. Awaiting a return call from the on call [facility] nurse.     1339- Spoke to Mackenzie ([phone]) with [employer], she stated she received my fax and will arrange this pt's HH care through [facility]. She requested I fax over so-signed orders and the pt's PT/OT tamy. Advised I will once the staff signs the HH orders. Also advised her that the pt' son Ron's name and contact number are on the face sheet and he should be the POC as the pt has Dementia. She stated she would let [facility] know.     1342- Spoke with Dr. Alcantar advised him I needed co-signed HH orders and he stated he would co-sign as soon as he can.     1450- Electronically co-signed orders, d/c summary, PT/OT faxed to  with fax confirmation e-mail recv'd.       Pt is clear to d/c from a CM standpoint.

## 2018-01-13 NOTE — PROGRESS NOTES
Pt ready for discharge. Discharge instructions given and explained to pt's son. Pt's son verbalizes understanding. No further questions from pt at this time. Pt to be discharged via wheelchair. Will cont to monitor until discharge.

## 2018-01-13 NOTE — PLAN OF CARE
[facility]    HOME HEALTH ORDERS  FACE TO FACE ENCOUNTER    Patient Name: Carin Nicholas  YOB: 1929    PCP: Arash Espinoza DO   PCP Address: [address] [facility] / [place] [address]  PCP Phone Number: [phone]  PCP Fax: [phone]    Encounter Date: 01/13/2018    Admit to Home Health    Diagnoses:  Active Hospital Problems    Diagnosis  POA    *Spinal stenosis [M48.00]  Yes    Dementia [F03.90]  Yes    Recurrent falls [R29.6]  Not Applicable    Urge incontinence [N39.41]  Yes    Essential hypertension [I10]  Yes     Chronic    Type 2 diabetes mellitus with diabetic polyneuropathy [E11.42]  Yes     Chronic    Hypothyroidism [E03.9]  Yes     Chronic    Hyperlipidemia [E78.5]  Yes     Chronic      Resolved Hospital Problems    Diagnosis Date Resolved POA   No resolved problems to display.       Future Appointments  Date Time Provider Department Center   1/25/2018 11:30 AM [facility]-MRI1 [facility] MRI IC [facility]   1/25/2018 1:40 PM Guillermo Packer PA-C [facility] NEUROS7 [facility]     Follow-up Information     Arash Espinoza DO In 1 week.    Specialty:  Internal Medicine  Contact information:  [address] [facility]  [place] [address]  [phone]                     I have seen and examined this patient face to face today. My clinical findings that support the need for the home health skilled services and home bound status are the following:  Weakness/numbness causing balance and gait disturbance due to dementia and spinal stenosis making it taxing to leave home.    Allergies:Review of patient's allergies indicates:  No Known Allergies    Diet: diabetic diet: 2000 calorie    Activities: activity as tolerated    Nursing:   SN to complete comprehensive assessment including routine vital signs. Instruct on disease process and s/s of complications to report to MD. Review/verify medication list sent home with the patient at time of discharge  and instruct  patient/caregiver as needed. Frequency may be adjusted depending on start of care date.    Notify MD if SBP > 160 or < 90; DBP > 90 or < 50; HR > 120 or < 50; Temp > 101      CONSULTS:    Physical Therapy to evaluate and treat. Evaluate for home safety and equipment needs; Establish/upgrade home exercise program. Perform / instruct on therapeutic exercises, gait training, transfer training, and Range of Motion.  Occupational Therapy to evaluate and treat. Evaluate home environment for safety and equipment needs. Perform/Instruct on transfers, ADL training, ROM, and therapeutic exercises.   to evaluate for community resources/long-range planning.  Aide to provide assistance with personal care, ADLs, and vital signs.    MISCELLANEOUS CARE:  N/A    WOUND CARE ORDERS  n/a      Medications: Review discharge medications with patient and family and provide education.      Current Discharge Medication List      START taking these medications    Details   celecoxib (CELEBREX) 100 MG capsule Take 1 capsule (100 mg total) by mouth 2 (two) times daily as needed for Pain.  Qty: 30 capsule, Refills: 0         CONTINUE these medications which have NOT CHANGED    Details   acetaminophen (TYLENOL) 650 MG TbSR Take 1,300 mg by mouth every 6 (six) hours as needed (pain).      amlodipine (NORVASC) 5 MG tablet TAKE ONE TABLET BY MOUTH EVERY DAY  Qty: 90 tablet, Refills: 3      blood sugar diagnostic Strp 1 strip by Misc.(Non-Drug; Combo Route) route 3 (three) times daily.  Qty: 200 strip, Refills: 11    Comments: For True Metrix meter      blood-glucose meter (FREESTYLE SYSTEM KIT) kit Use as instructed  Qty: 1 each, Refills: 0    Comments: True Metrix meter      carvedilol (COREG) 6.25 MG tablet Take 1 tablet (6.25 mg total) by mouth 2 (two) times daily.  Qty: 60 tablet, Refills: 5      folic acid-vit B6-vit B12 2.5-25-2 mg (FOLBIC OR EQUIV) 2.5-25-2 mg Tab Take 1 tablet by mouth once daily.  Qty: 30 tablet, Refills: 5     "  insulin aspart protamine-insulin aspart (NOVOLOG 70/30) 100 unit/mL (70-30) InPn pen Inject 16 units into skin with breakfast and 7 units with dinner  Qty: 1 Box, Refills: 5      lancets (ONETOUCH ULTRASOFT LANCETS) Misc 1 lancet by Misc.(Non-Drug; Combo Route) route 3 (three) times daily.  Qty: 200 each, Refills: 11    Comments: For True Metrix meter      levothyroxine (SYNTHROID) 50 MCG tablet Take 1 tablet (50 mcg total) by mouth before breakfast.  Qty: 90 tablet, Refills: 1      lisinopril (PRINIVIL,ZESTRIL) 40 MG tablet Take 1 tablet (40 mg total) by mouth once daily.  Qty: 90 tablet, Refills: 0      mirabegron (MYRBETRIQ) 25 mg Tb24 ER tablet Take 1 tablet (25 mg total) by mouth once daily.  Qty: 90 tablet, Refills: 4    Associated Diagnoses: Urge incontinence; Urinary urgency      NOVOFINE 32 32 gauge x 1/4" Ndle Use twice daily      senna-docusate 8.6-50 mg (PERICOLACE) 8.6-50 mg per tablet Take 1 tablet by mouth 2 (two) times daily.      vitamin D 1000 units Tab Take 2 tablets (2,000 Units total) by mouth once daily.         STOP taking these medications       aspirin (ECOTRIN) 81 MG EC tablet Comments:   Reason for Stopping:               I certify that this patient is confined to her home and needs intermittent skilled nursing care, physical therapy and occupational therapy.      "

## 2018-01-16 ENCOUNTER — TELEPHONE (OUTPATIENT)
Dept: INTERNAL MEDICINE | Facility: CLINIC | Age: 83
End: 2018-01-16

## 2018-01-16 NOTE — TELEPHONE ENCOUNTER
----- Message from Ivana Calderon sent at 1/15/2018 11:28 AM CST -----  Contact: [facility]/[phone]  Neli receive home health order and would like to confirm that  Dr Espinoza will follow up patient home health order. Please call and advise.       Thank you!!!

## 2018-01-16 NOTE — TELEPHONE ENCOUNTER
Spoke to phone staff unable to speak to [facility]. Need information re:  orders, patient was last seen in May 31,2017

## 2018-01-22 PROCEDURE — 99284 EMERGENCY DEPT VISIT MOD MDM: CPT | Mod: ,,, | Performed by: EMERGENCY MEDICINE

## 2018-01-22 PROCEDURE — 99284 EMERGENCY DEPT VISIT MOD MDM: CPT

## 2018-01-23 ENCOUNTER — HOSPITAL ENCOUNTER (EMERGENCY)
Facility: HOSPITAL | Age: 83
Discharge: HOME | End: 2018-01-23
Attending: EMERGENCY MEDICINE
Payer: MEDICARE

## 2018-01-23 ENCOUNTER — TELEPHONE (OUTPATIENT)
Dept: INTERNAL MEDICINE | Facility: CLINIC | Age: 83
End: 2018-01-23

## 2018-01-23 VITALS
WEIGHT: 150 LBS | HEIGHT: 62 IN | OXYGEN SATURATION: 100 % | HEART RATE: 72 BPM | SYSTOLIC BLOOD PRESSURE: 126 MMHG | RESPIRATION RATE: 18 BRPM | BODY MASS INDEX: 27.6 KG/M2 | TEMPERATURE: 98 F | DIASTOLIC BLOOD PRESSURE: 58 MMHG

## 2018-01-23 DIAGNOSIS — M54.50 BILATERAL LOW BACK PAIN WITHOUT SCIATICA, UNSPECIFIED CHRONICITY: ICD-10-CM

## 2018-01-23 DIAGNOSIS — E11.9 DM2 (DIABETES MELLITUS, TYPE 2): ICD-10-CM

## 2018-01-23 DIAGNOSIS — M25.552 PAIN OF BOTH HIP JOINTS: Primary | ICD-10-CM
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
## 2018-01-23 DIAGNOSIS — M25.559 HIP PAIN: ICD-10-CM

## 2018-01-23 DIAGNOSIS — Z79.4 LONG TERM (CURRENT) USE OF INSULIN: Chronic | ICD-10-CM

## 2018-01-23 DIAGNOSIS — E11.22 TYPE 2 DIABETES MELLITUS WITH DIABETIC CHRONIC KIDNEY DISEASE: Chronic | ICD-10-CM

## 2018-01-23 DIAGNOSIS — M25.551 PAIN OF BOTH HIP JOINTS: Primary | ICD-10-CM

## 2018-01-23 PROCEDURE — 25000003 PHARM REV CODE 250: Performed by: EMERGENCY MEDICINE

## 2018-01-23 RX ORDER — ACETAMINOPHEN 325 MG/1
650 TABLET ORAL
Status: COMPLETED | OUTPATIENT
Start: 2018-01-23 | End: 2018-01-23

## 2018-01-23 RX ADMIN — ACETAMINOPHEN 650 MG: 325 TABLET, FILM COATED ORAL at 03:01

## 2018-01-23 NOTE — ED NOTES
Pain: hip pain 5/10 bilateral.     Psychosocial: Patient is calm and cooperative. Patients insight and judgement are appropriate to situation. Appears clean, well maintained, with clothing appropriate to environment. No evidence of delusions, hallucinations, or psychosis.    Neuro: Eyes open spontaneously. Awake, alert, oriented x 4. Speech clear and appropriate. Tolerating saliva secretions well. Able to follow commands, demonstrating ability to actively and appropriately communicate within context of current conversation. Symmetrical facial muscles. Moving all extremities well with no noted weakness. Adequate muscle tone present. Movement is purposeful. No evidence of impaired sensation. Responds to external stimuli with appropriate reflexes.     Airway: Bilateral chest rise and fall. RR regular and non-labored. Air entry patent and clear x 5 lobes of the lungs. No crepitus or subcutaneous emphysema noted on palpation.     Circulatory: Skin warm, dry, and pink. Apical and radial pulses strong and regular. Capillary refill/skin blanching less than 3 seconds to distal of 4 extremities. Placed on CM in NSR without ectopy.    Abdomen: Abdomen obese, soft and non-distended. Positive normo-active bowel sounds x 4 quadrants.     Urinary: Patient reports routine urination without pain, frequency, or urgency. Voids independently. Reports urine appears kristie/yellow in color.    Extremities: No redness, heat, swelling, deformity, or pain.     Skin: Intact with no bruising/discolorations noted.

## 2018-01-23 NOTE — ED PROVIDER NOTES
Encounter Date: 1/22/2018    SCRIBE #1 NOTE: I, Mike Rhodes, am scribing for, and in the presence of,  Charisma Rhodes MD. I have scribed the entire note.       History     Chief Complaint   Patient presents with    Fall     slipped and fell at home with lower back pain and bilateral hip pain; no deformity noted     Time seen by provider: 1:24 AM    This is a 88 y.o. Female with co-morbidities including hypertension, hyperlipidemia, diabetes mellitus type 2, and arthritis who presents to the Emergency Department with complaint of back and leg pain and bilateral hip pain s/p mechanical fall. Patient states that her pain is 9/10 in intensity. Patient apparently brought to the ED after slip and fall at home. Patient states that she does not recall falling; however, she does recall sudden onset of her back pain, leg pain, and bilateral hip pain. Patient denies any LOC. She states that she does not remember how she developed bruises to her right arm. Patient denies history of similar pain. She denies chest pain, SOB, fever, or cough. She reports that her last bowel movement was yesterday which was normal. Patient mentions that she has baseline weakness of her bilateral lower extremities.       The history is provided by the patient and medical records.     Review of patient's allergies indicates:  No Known Allergies  Past Medical History:   Diagnosis Date    Arthritis     Diabetes mellitus, type 2     Hyperlipidemia     Hypertension     Hypothyroidism     Rheumatoid arthritis      Past Surgical History:   Procedure Laterality Date    APPENDECTOMY      HYSTERECTOMY       Family History   Problem Relation Age of Onset    Cancer Brother      lung cancer    Cancer Brother      leg cancer     Social History   Substance Use Topics    Smoking status: Never Smoker    Smokeless tobacco: Never Used    Alcohol use No     Review of Systems   Constitutional: Negative for fever.   HENT: Negative for sore throat.     Respiratory: Negative for shortness of breath.    Cardiovascular: Negative for chest pain.   Gastrointestinal: Negative for abdominal pain, constipation, nausea and vomiting.   Genitourinary: Negative for dysuria.   Musculoskeletal: Positive for arthralgias (bilateral hip pain), back pain and myalgias (bilateral lower extremity pain).   Skin: Negative for rash.   Neurological: Negative for weakness.   Hematological: Does not bruise/bleed easily.       Physical Exam     Initial Vitals [01/22/18 2018]   BP Pulse Resp Temp SpO2   (!) 126/58 72 18 97.5 °F (36.4 °C) 100 %      MAP       80.67         Physical Exam    Nursing note and vitals reviewed.  Constitutional: She appears well-developed and well-nourished. No distress (no acute distress).   Patient is elderly.    HENT:   Head: Normocephalic and atraumatic.   Mouth/Throat: Oropharynx is clear and moist.   Cardiovascular: Normal rate, regular rhythm, normal heart sounds and intact distal pulses.   2+ distal pulses    Pulmonary/Chest: Breath sounds normal. No respiratory distress. She has no wheezes. She has no rhonchi. She has no rales.   Abdominal: Soft. She exhibits no distension. There is no tenderness.   Musculoskeletal: Normal range of motion. She exhibits no edema or tenderness.   No peripheral edema. Patient has midline lumbar tenderness.    Neurological: She is alert and oriented to person, place, and time. She has normal strength. No cranial nerve deficit or sensory deficit.   Patient is oriented to person and place. No focal deficits. 4/5 strength to bilateral lower extremities. 5/5 strength to bilateral upper extremities.    Skin:   Patient has a 4 x 2 cm lipoma of the right back and 2 x 2 cm lipoma of the left shoulder.          ED Course   Procedures  Labs Reviewed - No data to display       X-Rays:   Independently Interpreted Readings:   Other Readings:  X-Ray Hips Bilateral 2 View Incl AP Pelvis shows no acute process.     X-Ray Lumbar Spine Ap And  Lateral shows severe scoliosis, no acute fracture.     Medical Decision Making:   History:   Old Medical Records: I decided to obtain old medical records.  Old Records Summarized: records from previous admission(s).       <> Summary of Records: Patient was last admitted 1/11/2018 for spinal stenosis for which NS has evaluated and is nonoperative at this time.    Initial Assessment:   Emergent evaluation of 88 y.o. female with back pain and leg pain. My initial differential diagnoses include but are not limited to acute fracture, lumbar fracture, hip fracture, hip contusion, spinal stenosis. She is neurologically intact, able to range her hips without significant tenderness. X-ray of lumbar spine and hip ordered.   Independently Interpreted Test(s):   I have ordered and independently interpreted X-rays - see prior notes.  Clinical Tests:   Radiological Study: Ordered and Reviewed  ED Management:  3:22 AM. X-rays of the hips show no acute process. X-rays of the lumbar spine show severe scoliosis but no acute fracture.  Patient stable for discharge            Scribe Attestation:   Scribe #1: I performed the above scribed service and the documentation accurately describes the services I performed. I attest to the accuracy of the note.    Attending Attestation:           Physician Attestation for Scribe:      Comments: I, Dr. Charisma Rhodes, personally performed the services described in this documentation. All medical record entries made by the scribe were at my direction and in my presence.  I have reviewed the chart and agree that the record reflects my personal performance and is accurate and complete. Charisma Rhodes MD.              ED Course      Clinical Impression:   The primary encounter diagnosis was Pain of both hip joints. Diagnoses of Hip pain and Bilateral low back pain without sciatica, unspecified chronicity were also pertinent to this visit.    Disposition:   Disposition: Discharged  Condition:  Stable                        Charisma Rhodes MD  01/23/18 4501       Charisma Rhodes MD  01/23/18 5955

## 2018-01-23 NOTE — ED TRIAGE NOTES
88 year old female pt presents to the ed after falling at home x3-4 hours ago.pt denies any chest pain sob or nausea. Pt expresses bilateral hip pain with no obvious deformity.

## 2018-01-25 ENCOUNTER — HOSPITAL ENCOUNTER (OUTPATIENT)
Facility: HOSPITAL | Age: 83
Discharge: NURSING, REHAB OR LONG-TERM CARE | End: 2018-01-29
Attending: EMERGENCY MEDICINE | Admitting: HOSPITALIST
Payer: MEDICARE

## 2018-01-25 DIAGNOSIS — R53.1 WEAKNESS: ICD-10-CM

## 2018-01-25 DIAGNOSIS — E11.42 TYPE 2 DIABETES MELLITUS WITH DIABETIC POLYNEUROPATHY, WITH LONG-TERM CURRENT USE OF INSULIN: Chronic | ICD-10-CM

## 2018-01-25 DIAGNOSIS — R53.83 FATIGUE: ICD-10-CM

## 2018-01-25 DIAGNOSIS — T17.908A ASPIRATION INTO AIRWAY, INITIAL ENCOUNTER: ICD-10-CM

## 2018-01-25 DIAGNOSIS — F03.90 DEMENTIA: ICD-10-CM

## 2018-01-25 DIAGNOSIS — Z79.4 TYPE 2 DIABETES MELLITUS WITH DIABETIC POLYNEUROPATHY, WITH LONG-TERM CURRENT USE OF INSULIN: Chronic | ICD-10-CM

## 2018-01-25 DIAGNOSIS — E11.9 DM2 (DIABETES MELLITUS, TYPE 2): ICD-10-CM
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
## 2018-01-25 DIAGNOSIS — R60.0 BILATERAL LEG EDEMA: ICD-10-CM

## 2018-01-25 DIAGNOSIS — I51.7 CARDIOMEGALY: ICD-10-CM

## 2018-01-25 LAB
ALBUMIN SERPL BCP-MCNC: 3.4 G/DL
ALP SERPL-CCNC: 88 U/L
ALT SERPL W/O P-5'-P-CCNC: 13 U/L
ANION GAP SERPL CALC-SCNC: 9 MMOL/L
AST SERPL-CCNC: 23 U/L
BACTERIA #/AREA URNS AUTO: NORMAL /HPF
BASOPHILS # BLD AUTO: 0.03 K/UL
BASOPHILS NFR BLD: 0.6 %
BILIRUB SERPL-MCNC: 0.3 MG/DL
BILIRUB UR QL STRIP: NEGATIVE
BNP SERPL-MCNC: 32 PG/ML
BUN SERPL-MCNC: 18 MG/DL
CALCIUM SERPL-MCNC: 9.3 MG/DL
CHLORIDE SERPL-SCNC: 101 MMOL/L
CLARITY UR REFRACT.AUTO: CLEAR
CO2 SERPL-SCNC: 24 MMOL/L
COLOR UR AUTO: ABNORMAL
CREAT SERPL-MCNC: 0.8 MG/DL
DIFFERENTIAL METHOD: ABNORMAL
EOSINOPHIL # BLD AUTO: 0.1 K/UL
EOSINOPHIL NFR BLD: 2.2 %
ERYTHROCYTE [DISTWIDTH] IN BLOOD BY AUTOMATED COUNT: 12.2 %
EST. GFR  (AFRICAN AMERICAN): >60 ML/MIN/1.73 M^2
EST. GFR  (NON AFRICAN AMERICAN): >60 ML/MIN/1.73 M^2
GLUCOSE SERPL-MCNC: 299 MG/DL
GLUCOSE UR QL STRIP: ABNORMAL
HCT VFR BLD AUTO: 34.7 %
HGB BLD-MCNC: 11.5 G/DL
HGB UR QL STRIP: NEGATIVE
IMM GRANULOCYTES # BLD AUTO: 0.02 K/UL
IMM GRANULOCYTES NFR BLD AUTO: 0.4 %
KETONES UR QL STRIP: NEGATIVE
LEUKOCYTE ESTERASE UR QL STRIP: NEGATIVE
LIPASE SERPL-CCNC: 14 U/L
LYMPHOCYTES # BLD AUTO: 1.5 K/UL
LYMPHOCYTES NFR BLD: 26.9 %
MAGNESIUM SERPL-MCNC: 2 MG/DL
MCH RBC QN AUTO: 31.1 PG
MCHC RBC AUTO-ENTMCNC: 33.1 G/DL
MCV RBC AUTO: 94 FL
MICROSCOPIC COMMENT: NORMAL
MONOCYTES # BLD AUTO: 0.4 K/UL
MONOCYTES NFR BLD: 7.2 %
NEUTROPHILS # BLD AUTO: 3.4 K/UL
NEUTROPHILS NFR BLD: 62.7 %
NITRITE UR QL STRIP: NEGATIVE
NRBC BLD-RTO: 0 /100 WBC
PH UR STRIP: 5 [PH] (ref 5–8)
PLATELET # BLD AUTO: 268 K/UL
PMV BLD AUTO: 10.6 FL
POCT GLUCOSE: 162 MG/DL (ref 70–110)
POCT GLUCOSE: 236 MG/DL (ref 70–110)
POTASSIUM SERPL-SCNC: 4.4 MMOL/L
PROT SERPL-MCNC: 6.8 G/DL
PROT UR QL STRIP: NEGATIVE
RBC # BLD AUTO: 3.7 M/UL
RBC #/AREA URNS AUTO: 2 /HPF (ref 0–4)
SODIUM SERPL-SCNC: 134 MMOL/L
SP GR UR STRIP: 1.01 (ref 1–1.03)
SQUAMOUS #/AREA URNS AUTO: 2 /HPF
TROPONIN I SERPL DL<=0.01 NG/ML-MCNC: 0.01 NG/ML
TSH SERPL DL<=0.005 MIU/L-ACNC: 1.37 UIU/ML
URN SPEC COLLECT METH UR: ABNORMAL
UROBILINOGEN UR STRIP-ACNC: NEGATIVE EU/DL
WBC # BLD AUTO: 5.4 K/UL
WBC #/AREA URNS AUTO: 0 /HPF (ref 0–5)
YEAST UR QL AUTO: NORMAL

## 2018-01-25 PROCEDURE — 83690 ASSAY OF LIPASE: CPT

## 2018-01-25 PROCEDURE — 63600175 PHARM REV CODE 636 W HCPCS: Performed by: EMERGENCY MEDICINE

## 2018-01-25 PROCEDURE — 25000003 PHARM REV CODE 250: Performed by: EMERGENCY MEDICINE

## 2018-01-25 PROCEDURE — 63600175 PHARM REV CODE 636 W HCPCS: Performed by: HOSPITALIST

## 2018-01-25 PROCEDURE — 84443 ASSAY THYROID STIM HORMONE: CPT

## 2018-01-25 PROCEDURE — 96372 THER/PROPH/DIAG INJ SC/IM: CPT

## 2018-01-25 PROCEDURE — 36415 COLL VENOUS BLD VENIPUNCTURE: CPT

## 2018-01-25 PROCEDURE — 93005 ELECTROCARDIOGRAM TRACING: CPT

## 2018-01-25 PROCEDURE — 81001 URINALYSIS AUTO W/SCOPE: CPT

## 2018-01-25 PROCEDURE — 93010 ELECTROCARDIOGRAM REPORT: CPT | Mod: ,,, | Performed by: INTERNAL MEDICINE

## 2018-01-25 PROCEDURE — 99285 EMERGENCY DEPT VISIT HI MDM: CPT | Mod: ,,, | Performed by: EMERGENCY MEDICINE

## 2018-01-25 PROCEDURE — 99219 PR INITIAL OBSERVATION CARE,LEVL II: CPT | Mod: ,,, | Performed by: HOSPITALIST

## 2018-01-25 PROCEDURE — G0378 HOSPITAL OBSERVATION PER HR: HCPCS

## 2018-01-25 PROCEDURE — 84484 ASSAY OF TROPONIN QUANT: CPT

## 2018-01-25 PROCEDURE — 96374 THER/PROPH/DIAG INJ IV PUSH: CPT

## 2018-01-25 PROCEDURE — 99285 EMERGENCY DEPT VISIT HI MDM: CPT | Mod: 25

## 2018-01-25 PROCEDURE — P9612 CATHETERIZE FOR URINE SPEC: HCPCS

## 2018-01-25 PROCEDURE — 83735 ASSAY OF MAGNESIUM: CPT

## 2018-01-25 PROCEDURE — 80053 COMPREHEN METABOLIC PANEL: CPT

## 2018-01-25 PROCEDURE — 83880 ASSAY OF NATRIURETIC PEPTIDE: CPT

## 2018-01-25 PROCEDURE — 82962 GLUCOSE BLOOD TEST: CPT

## 2018-01-25 PROCEDURE — 85025 COMPLETE CBC W/AUTO DIFF WBC: CPT

## 2018-01-25 RX ORDER — ACETAMINOPHEN 500 MG
1000 TABLET ORAL
Status: COMPLETED | OUTPATIENT
Start: 2018-01-25 | End: 2018-01-25

## 2018-01-25 RX ORDER — ONDANSETRON 2 MG/ML
4 INJECTION INTRAMUSCULAR; INTRAVENOUS
Status: COMPLETED | OUTPATIENT
Start: 2018-01-25 | End: 2018-01-25

## 2018-01-25 RX ORDER — AMLODIPINE BESYLATE 10 MG/1
10 TABLET ORAL DAILY
Status: DISCONTINUED | OUTPATIENT
Start: 2018-01-26 | End: 2018-01-29 | Stop reason: HOSPADM

## 2018-01-25 RX ORDER — IBUPROFEN 200 MG
16 TABLET ORAL
Status: DISCONTINUED | OUTPATIENT
Start: 2018-01-25 | End: 2018-01-29 | Stop reason: HOSPADM

## 2018-01-25 RX ORDER — ENOXAPARIN SODIUM 100 MG/ML
40 INJECTION SUBCUTANEOUS EVERY 24 HOURS
Status: DISCONTINUED | OUTPATIENT
Start: 2018-01-25 | End: 2018-01-29 | Stop reason: HOSPADM

## 2018-01-25 RX ORDER — INSULIN ASPART 100 [IU]/ML
1-10 INJECTION, SOLUTION INTRAVENOUS; SUBCUTANEOUS
Status: DISCONTINUED | OUTPATIENT
Start: 2018-01-25 | End: 2018-01-29 | Stop reason: HOSPADM

## 2018-01-25 RX ORDER — IBUPROFEN 200 MG
24 TABLET ORAL
Status: DISCONTINUED | OUTPATIENT
Start: 2018-01-25 | End: 2018-01-29 | Stop reason: HOSPADM

## 2018-01-25 RX ORDER — GLUCAGON 1 MG
1 KIT INJECTION
Status: DISCONTINUED | OUTPATIENT
Start: 2018-01-25 | End: 2018-01-29 | Stop reason: HOSPADM

## 2018-01-25 RX ORDER — SODIUM CHLORIDE 0.9 % (FLUSH) 0.9 %
3 SYRINGE (ML) INJECTION
Status: DISCONTINUED | OUTPATIENT
Start: 2018-01-25 | End: 2018-01-29 | Stop reason: HOSPADM

## 2018-01-25 RX ORDER — ACETAMINOPHEN 325 MG/1
650 TABLET ORAL EVERY 4 HOURS PRN
Status: DISCONTINUED | OUTPATIENT
Start: 2018-01-25 | End: 2018-01-29 | Stop reason: HOSPADM

## 2018-01-25 RX ADMIN — ACETAMINOPHEN 1000 MG: 500 TABLET ORAL at 01:01

## 2018-01-25 RX ADMIN — INSULIN DETEMIR 15 UNITS: 100 INJECTION, SOLUTION SUBCUTANEOUS at 05:01

## 2018-01-25 RX ADMIN — INSULIN ASPART 2 UNITS: 100 INJECTION, SOLUTION INTRAVENOUS; SUBCUTANEOUS at 09:01

## 2018-01-25 RX ADMIN — ONDANSETRON 4 MG: 2 INJECTION INTRAMUSCULAR; INTRAVENOUS at 11:01

## 2018-01-25 RX ADMIN — ENOXAPARIN SODIUM 40 MG: 100 INJECTION SUBCUTANEOUS at 05:01

## 2018-01-25 RX ADMIN — INSULIN ASPART 2 UNITS: 100 INJECTION, SOLUTION INTRAVENOUS; SUBCUTANEOUS at 05:01

## 2018-01-25 NOTE — ED NOTES
"Pt in room crying states "I can't talk to my son, my phone will not work".  Assisted pt to call son, pt talking to son, siderails up/call light in reach.  "

## 2018-01-25 NOTE — ED NOTES
Patient resting in stretcher and is in NAD at this time. Pt is awake alert and oriented to place and person only, respirations even and unlabored. Pt updated on plan of care. Bed low and locked with side rails up x2, call bell in pt reach. Pt voices no needs at this time.

## 2018-01-25 NOTE — ED NOTES
Pt resting in bed without complaints at present.  Pt updated on status.  Siderails up x 2/call light in reach.

## 2018-01-25 NOTE — ED PROVIDER NOTES
Encounter Date: 1/25/2018    SCRIBE #1 NOTE: I, Dar Miller, am scribing for, and in the presence of,  Dr. Bell. I have scribed the following portions of the note -       History     Chief Complaint   Patient presents with    Fatigue     malaise x 2 days. Pt denies NVD +weakness     Carin Nicholas is a 88 y.o. female presenting with weakness and fatigue. Pt has dementia and can not provide Hx. Hx is provided through EMS. EMS was activated by home-visiting therapist that found the pt too weak to participate in therpy. Chart review shows pt lives alone and has a son in [place]. She was recently admitted and SNF/LNC was recommended but HH was elected. Son is trying to transfer his mother to [place] shortly. Pt denies any localizing symptoms besides nausea. Pt denies falls.             Review of patient's allergies indicates:  No Known Allergies  Past Medical History:   Diagnosis Date    Arthritis     Diabetes mellitus, type 2     Hyperlipidemia     Hypertension     Hypothyroidism     Rheumatoid arthritis      Past Surgical History:   Procedure Laterality Date    APPENDECTOMY      HYSTERECTOMY       Family History   Problem Relation Age of Onset    Cancer Brother      lung cancer    Cancer Brother      leg cancer     Social History   Substance Use Topics    Smoking status: Never Smoker    Smokeless tobacco: Never Used    Alcohol use No     Review of Systems   Unable to perform ROS: Dementia       Physical Exam     Initial Vitals [01/25/18 1059]   BP Pulse Resp Temp SpO2   (!) 176/78 79 16 98.7 °F (37.1 °C) 99 %      MAP       110.67         Physical Exam    Nursing note and vitals reviewed.  Constitutional: She appears well-developed and well-nourished.   HENT:   Head: Normocephalic and atraumatic.   Eyes: Conjunctivae are normal. Pupils are equal, round, and reactive to light.   Neck: Normal range of motion. Neck supple.   Cardiovascular: Normal rate, regular rhythm, normal heart sounds and intact  distal pulses. Exam reveals no gallop and no friction rub.    No murmur heard.  Pulmonary/Chest: Breath sounds normal. No respiratory distress. She has no wheezes. She has no rhonchi. She has no rales. She exhibits no tenderness.   Abdominal: Soft. Bowel sounds are normal. She exhibits no distension. There is no tenderness. There is no rebound and no guarding.   Musculoskeletal: Normal range of motion. She exhibits edema (1+ BLE). She exhibits no tenderness.   Neurological: She is alert. She has normal strength. She is disoriented. No cranial nerve deficit or sensory deficit. GCS eye subscore is 4. GCS verbal subscore is 4. GCS motor subscore is 6.   Skin: Skin is warm. Capillary refill takes less than 2 seconds.   Psychiatric: Thought content normal. Her affect is blunt. Her speech is delayed. She is slowed. Cognition and memory are impaired. She exhibits abnormal recent memory and abnormal remote memory.         ED Course   Procedures  Labs Reviewed   CBC W/ AUTO DIFFERENTIAL - Abnormal; Notable for the following:        Result Value    RBC 3.70 (*)     Hemoglobin 11.5 (*)     Hematocrit 34.7 (*)     MCH 31.1 (*)     All other components within normal limits   COMPREHENSIVE METABOLIC PANEL - Abnormal; Notable for the following:     Sodium 134 (*)     Glucose 299 (*)     Albumin 3.4 (*)     All other components within normal limits   URINALYSIS - Abnormal; Notable for the following:     Glucose, UA 3+ (*)     All other components within normal limits    Narrative:     extra urine   LIPASE   TROPONIN I   B-TYPE NATRIURETIC PEPTIDE   TSH   URINALYSIS MICROSCOPIC    Narrative:     extra urine     EKG Readings: (Independently Interpreted)   Previous EKG: Compared with most recent EKG Rhythm: Normal Sinus Rhythm. Heart Rate: 80. ST Segments: Normal ST Segments. T Waves: Normal. Other Impression: Left Atrial Enlargement       X-Rays:   Independently Interpreted Readings:   Chest X-Ray: No acute abnormalities.     Medical  Decision Making:   History:   Old Medical Records: I decided to obtain old medical records.  Clinical Tests:   Lab Tests: Ordered       APC / Resident Notes:   HO4 MDM  Pt is 88 y.o. female that presents with weakness. DDx includes but is not limited to occult infection, dehyrdation, FTT, dementia, hypothyroidism. Will get get labs, EKG, CXR and monitor.   Osvaldo Smith  [facility] Emergency [facility] Resident  11:37 AM 01/25/2018     Re-evaluation  Labs unrevealing. Pt will be placed in Obs for attempted placement. Pt updated.   Osvaldo Smith  [facility] Emergency Medicine Resident  3:18 PM 01/25/2018      Scribe Attestation:   Scribe #1: I performed the above scribed service and the documentation accurately describes the services I performed. I attest to the accuracy of the note.    Attending Attestation:   Physician Attestation Statement for Resident:  As the supervising MD   Physician Attestation Statement: I have personally seen and examined this patient.   I agree with the above history. -: Mild urinary hesitancy and fatigue. Pt is oriented x2 and has no SIRS criteria.    As the supervising MD I agree with the above PE.    As the supervising MD I agree with the above treatment, course, plan, and disposition.  I have reviewed and agree with the residents interpretation of the following: lab data.          Physician Attestation for Scribe:      Comments: I, Dr. Jonny Bell, personally performed the services described in this documentation. All medical record entries made by the scribe were at my direction and in my presence.  I have reviewed the chart and agree that the record reflects my personal performance and is accurate and complete. Jonny Bell MD.  2:00 PM 01/25/2018              ED Course      Clinical Impression:   Fatigue, Weakness                           Osvaldo Smith MD  Resident  01/25/18 1551

## 2018-01-25 NOTE — ED NOTES
Clarified with Dr. Fox diet orders and Insulin orders. Per MD, he will change diet to diabetic and will order accuchecks.

## 2018-01-25 NOTE — ED TRIAGE NOTES
Patient was brought in per EMS from home due to weakness, gen. malaise, and frequent falls. She was here last Monday for same complaints. She lives alone.

## 2018-01-25 NOTE — H&P
"[facility] Hospital Medicine  History & Physical    Patient Name: Carin Nicholas  MRN: [id number]  Admission Date: 1/25/2018  Attending Physician: Rao Fox MD  Primary Care Provider: Arash Espinoza [facility] Medicine Team: [facility] HOSP MED A Rao Fox MD     Patient information was obtained from patient and ER records.     Subjective:     Principal Problem:<principal problem not specified>    Chief Complaint:   Chief Complaint   Patient presents with    Fatigue     malaise x 2 days. Pt denies NVD +weakness        HPI: Carin Nicholas is a 88 y.o. female presenting with weakness and fatigue. Pt states that she felt weak at home and thought she might "pass out." She states that appetite has been poor for 2 days.     Past Medical History:   Diagnosis Date    Arthritis     Diabetes mellitus, type 2     Hyperlipidemia     Hypertension     Hypothyroidism     Rheumatoid arthritis        Past Surgical History:   Procedure Laterality Date    APPENDECTOMY      HYSTERECTOMY         Review of patient's allergies indicates:  No Known Allergies    No current facility-administered medications on file prior to encounter.      Current Outpatient Prescriptions on File Prior to Encounter   Medication Sig    acetaminophen (TYLENOL) 650 MG TbSR Take 1,300 mg by mouth every 6 (six) hours as needed (pain).    amlodipine (NORVASC) 5 MG tablet TAKE ONE TABLET BY MOUTH EVERY DAY    blood sugar diagnostic Strp 1 strip by Misc.(Non-Drug; Combo Route) route 3 (three) times daily.    blood-glucose meter (FREESTYLE SYSTEM KIT) kit Use as instructed    carvedilol (COREG) 6.25 MG tablet Take 1 tablet (6.25 mg total) by mouth 2 (two) times daily.    celecoxib (CELEBREX) 100 MG capsule Take 1 capsule (100 mg total) by mouth 2 (two) times daily as needed for Pain.    folic acid-vit B6-vit B12 2.5-25-2 mg (FOLBIC OR EQUIV) 2.5-25-2 mg Tab Take 1 tablet by mouth once daily.    insulin aspart " "protamine-insulin aspart (NOVOLOG 70/30) 100 unit/mL (70-30) InPn pen Inject 16 units into skin with breakfast and 7 units with dinner (Patient taking differently: Inject 16 units into skin with breakfast and 8 units with dinner)    lancets (ONETOUCH ULTRASOFT LANCETS) Misc 1 lancet by Misc.(Non-Drug; Combo Route) route 3 (three) times daily.    levothyroxine (SYNTHROID) 50 MCG tablet Take 1 tablet (50 mcg total) by mouth before breakfast.    lisinopril (PRINIVIL,ZESTRIL) 40 MG tablet Take 1 tablet (40 mg total) by mouth once daily.    mirabegron (MYRBETRIQ) 25 mg Tb24 ER tablet Take 1 tablet (25 mg total) by mouth once daily.    NOVOFINE 32 32 gauge x 1/4" Ndle Use twice daily    senna-docusate 8.6-50 mg (PERICOLACE) 8.6-50 mg per tablet Take 1 tablet by mouth 2 (two) times daily. (Patient taking differently: Take 1 tablet by mouth every evening. )    vitamin D 1000 units Tab Take 2 tablets (2,000 Units total) by mouth once daily.     Family History     Problem Relation (Age of Onset)    Cancer Brother, Brother        Social History Main Topics    Smoking status: Never Smoker    Smokeless tobacco: Never Used    Alcohol use No    Drug use: No    Sexual activity: No     Review of Systems   Constitutional: Positive for appetite change and unexpected weight change (3lbs in 3-4 wks). Negative for chills and fever.   HENT: Negative for congestion, rhinorrhea, sinus pressure and sore throat.    Eyes: Negative for visual disturbance.   Respiratory: Negative for shortness of breath.    Cardiovascular: Negative for chest pain.   Gastrointestinal: Negative for abdominal distention, abdominal pain, constipation, diarrhea, nausea and vomiting.   Genitourinary: Negative for dysuria.   Musculoskeletal: Negative for arthralgias and myalgias.   Skin: Negative for rash.   Neurological: Positive for dizziness, weakness, light-headedness and headaches (4 months ago, R temporal). Negative for syncope, speech difficulty and " numbness.      Unable to perform ROS: Dementia   Objective:     Vital Signs (Most Recent):  Temp: 98.3 °F (36.8 °C) (01/25/18 1306)  Pulse: 80 (01/25/18 1306)  Resp: 20 (01/25/18 1306)  BP: (!) 127/99 (01/25/18 1306)  SpO2: 96 % (01/25/18 1306) Vital Signs (24h Range):  Temp:  [98.1 °F (36.7 °C)-98.7 °F (37.1 °C)] 98.3 °F (36.8 °C)  Pulse:  [72-80] 80  Resp:  [16-20] 20  SpO2:  [96 %-100 %] 96 %  BP: (127-182)/(74-99) 127/99     Weight: 68.5 kg (151 lb)  Body mass index is 29.49 kg/m².    Physical Exam    Nursing note and vitals reviewed.  Constitutional: She appears well-developed and well-nourished.   HENT:   Head: Normocephalic and atraumatic.   Eyes: Conjunctivae are normal. Pupils are equal, round, and reactive to light.   Neck: Normal range of motion. Neck supple.   Cardiovascular: Normal rate, regular rhythm, normal heart sounds and intact distal pulses. Exam reveals no gallop and no friction rub.    No murmur heard.  Pulmonary/Chest: Breath sounds normal. No respiratory distress. She has no wheezes. She has no rhonchi. She has no rales. She exhibits no tenderness.   Abdominal: Soft. Bowel sounds are normal. She exhibits no distension. There is no tenderness. There is no rebound and no guarding. Fluid   Musculoskeletal: Normal range of motion. She exhibits edema (1+ BLE). She exhibits no tenderness.   Neurological: She is alert. She has mostly normal strength, some weakness on L hip extension but she says 2/2 pain. She is oriented to person, place and January but not year or president. No cranial nerve deficit or sensory deficit. GCS is 15  Skin: Skin is warm. Bruise on medial side of R knee, and R arm forearm  Psychiatric: Thought content normal. Her speech is slowed. Cognition and memory are impaired.    Significant Labs: All pertinent labs within the past 24 hours have been reviewed.    Significant Imaging: I have reviewed and interpreted all pertinent imaging results/findings within the past 24  hours.    Assessment/Plan:     Active Diagnoses:    Diagnosis Date Noted POA    Fatigue [R53.83] 01/25/2018 Yes      Problems Resolved During this Admission:    Diagnosis Date Noted Date Resolved POA     VTE Risk Mitigation     None        Subjective weakness, Recurrent falls  Pt lives alone and feels unsteady on her feet. Good strength. Bruising from prior falls. Recent admit for fall. Son lives in [place] and they working on relocating her there. -possibly 2/2 to spinal stenosis vs orthostatic hypotension vs gait instability (neuropathy, debility). Pt not taking Dm, thyroid or HTN meds. Some concern for diastolic heart failure based on exam. BNP nl. B12 wnl  -TTE ordered  -PT/OT eval, last admit rec SNF and likely will need to go there upon discharge     Spinal stenosis     -MRI at last admit: significant multilevel stenosis worst L3-L5. No fracture. neuro surgery evaluated pt; appreciate assistance                       Dementia     -fall precautions  -son is POA and understands that patient is heading towards needing more assistance in caring for self, agreeable to SNF this time  - Son states that he will start the process of switching her [place] so that she can move to [place], where he lives          Urge incontinence     -chronic problem, but no complaints today  -Rx for mirabregron 25 mg PO qhs  -UA neg for UTI. Cx NG       Essential hypertension     -continue home  Lisinopril. Can consider norvasc but will hold for now given pt states that she is not taking them. Son says she is taking them. Appears volume overloaded. Will hold           Type 2 diabetes mellitus with diabetic polyneuropathy     -A1C 8.9 5/2017; home meds novolog 70/30 16U AM and 8U qhs  -levemir 15u given in Ed, will continue moderate dose sliding scale       Hypothyroidism     -synthroid home med, but unclear compliance. TSH nl. Will hold off for now.          Rao Fox MD  Department of Hospital Medicine   [facility]

## 2018-01-25 NOTE — ED NOTES
"Pt states " my mouth is dry,  Can I have some water?"  Pt updated on NPO status for now.  Moist swab provided, pt states "thank you".  Pt without other complaint, siderails up x2/call light in reach.  "

## 2018-01-25 NOTE — ED NOTES
Pt identifiers checked and correct.    LOC: The patient is awake, alert and aware of environment with an appropriate affect, the patient is oriented to place only and disoriented to person, time, and situation but speaking appropriately.   APPEARANCE: Patient resting comfortably and in no acute distress, patient is clean and well groomed, patient's clothing is properly fastened.   SKIN: The skin is warm and dry, color consistent with ethnicity, patient has normal skin turgor and moist mucus membranes, skin intact, no breakdown. Bruise to right knee noted.  MUSCULOSKELETAL: Patient moving all extremities spontaneously, no obvious swelling or deformities noted.   RESPIRATORY: Airway is open and patent, respirations are spontaneous, patient has a normal effort and rate, no accessory muscle use noted.   CARDIAC: Patient has a normal rate and regular rhythm, no periphreal edema noted, capillary refill < 3 seconds.   ABDOMEN: Soft and non tender to palpation, no distention noted, active bowel sounds present in all four quadrants.   NEUROLOGIC: Eyes open spontaneously, behavior appropriate to situation, follows commands, facial expression symmetrical, bilateral hand grasp equal and even, purposeful motor response noted, normal sensation in all extremities when touched with a finger.

## 2018-01-26 LAB
ANION GAP SERPL CALC-SCNC: 11 MMOL/L
AORTIC VALVE REGURGITATION: NORMAL
BASOPHILS # BLD AUTO: 0.04 K/UL
BASOPHILS NFR BLD: 0.5 %
BUN SERPL-MCNC: 19 MG/DL
CALCIUM SERPL-MCNC: 9.4 MG/DL
CHLORIDE SERPL-SCNC: 101 MMOL/L
CO2 SERPL-SCNC: 25 MMOL/L
CREAT SERPL-MCNC: 0.7 MG/DL
DIFFERENTIAL METHOD: ABNORMAL
EOSINOPHIL # BLD AUTO: 0.1 K/UL
EOSINOPHIL NFR BLD: 1.7 %
ERYTHROCYTE [DISTWIDTH] IN BLOOD BY AUTOMATED COUNT: 12.3 %
EST. GFR  (AFRICAN AMERICAN): >60 ML/MIN/1.73 M^2
EST. GFR  (NON AFRICAN AMERICAN): >60 ML/MIN/1.73 M^2
ESTIMATED PA SYSTOLIC PRESSURE: 35.72
GLUCOSE SERPL-MCNC: 144 MG/DL
HCT VFR BLD AUTO: 36.6 %
HGB BLD-MCNC: 12.1 G/DL
IMM GRANULOCYTES # BLD AUTO: 0.03 K/UL
IMM GRANULOCYTES NFR BLD AUTO: 0.4 %
LYMPHOCYTES # BLD AUTO: 1.8 K/UL
LYMPHOCYTES NFR BLD: 23.2 %
MCH RBC QN AUTO: 31.1 PG
MCHC RBC AUTO-ENTMCNC: 33.1 G/DL
MCV RBC AUTO: 94 FL
MONOCYTES # BLD AUTO: 0.6 K/UL
MONOCYTES NFR BLD: 8.2 %
NEUTROPHILS # BLD AUTO: 5 K/UL
NEUTROPHILS NFR BLD: 66 %
NRBC BLD-RTO: 0 /100 WBC
PLATELET # BLD AUTO: 303 K/UL
PMV BLD AUTO: 10.6 FL
POCT GLUCOSE: 147 MG/DL (ref 70–110)
POCT GLUCOSE: 154 MG/DL (ref 70–110)
POCT GLUCOSE: 297 MG/DL (ref 70–110)
POTASSIUM SERPL-SCNC: 4.3 MMOL/L
RBC # BLD AUTO: 3.89 M/UL
RETIRED EF AND QEF - SEE NOTES: 65 (ref 55–65)
SODIUM SERPL-SCNC: 137 MMOL/L
TRICUSPID VALVE REGURGITATION: NORMAL
WBC # BLD AUTO: 7.59 K/UL

## 2018-01-26 PROCEDURE — 97166 OT EVAL MOD COMPLEX 45 MIN: CPT

## 2018-01-26 PROCEDURE — 97530 THERAPEUTIC ACTIVITIES: CPT

## 2018-01-26 PROCEDURE — 99225 PR SUBSEQUENT OBSERVATION CARE,LEVEL II: CPT | Mod: ,,, | Performed by: HOSPITALIST

## 2018-01-26 PROCEDURE — G8978 MOBILITY CURRENT STATUS: HCPCS | Mod: CK

## 2018-01-26 PROCEDURE — 25000003 PHARM REV CODE 250: Performed by: HOSPITALIST

## 2018-01-26 PROCEDURE — 63600175 PHARM REV CODE 636 W HCPCS: Performed by: NURSE PRACTITIONER

## 2018-01-26 PROCEDURE — 36415 COLL VENOUS BLD VENIPUNCTURE: CPT

## 2018-01-26 PROCEDURE — G0378 HOSPITAL OBSERVATION PER HR: HCPCS

## 2018-01-26 PROCEDURE — 85025 COMPLETE CBC W/AUTO DIFF WBC: CPT

## 2018-01-26 PROCEDURE — 97161 PT EVAL LOW COMPLEX 20 MIN: CPT

## 2018-01-26 PROCEDURE — 63600175 PHARM REV CODE 636 W HCPCS: Performed by: HOSPITALIST

## 2018-01-26 PROCEDURE — 93306 TTE W/DOPPLER COMPLETE: CPT

## 2018-01-26 PROCEDURE — G8979 MOBILITY GOAL STATUS: HCPCS | Mod: CJ

## 2018-01-26 PROCEDURE — G8988 SELF CARE GOAL STATUS: HCPCS | Mod: CJ

## 2018-01-26 PROCEDURE — 97535 SELF CARE MNGMENT TRAINING: CPT

## 2018-01-26 PROCEDURE — 80048 BASIC METABOLIC PNL TOTAL CA: CPT

## 2018-01-26 PROCEDURE — 93306 TTE W/DOPPLER COMPLETE: CPT | Mod: 26,,, | Performed by: INTERNAL MEDICINE

## 2018-01-26 PROCEDURE — G8987 SELF CARE CURRENT STATUS: HCPCS | Mod: CK

## 2018-01-26 RX ORDER — ONDANSETRON 4 MG/1
8 TABLET, ORALLY DISINTEGRATING ORAL EVERY 8 HOURS PRN
Status: DISCONTINUED | OUTPATIENT
Start: 2018-01-26 | End: 2018-01-26

## 2018-01-26 RX ORDER — LISINOPRIL 20 MG/1
40 TABLET ORAL DAILY
Status: DISCONTINUED | OUTPATIENT
Start: 2018-01-26 | End: 2018-01-29 | Stop reason: HOSPADM

## 2018-01-26 RX ORDER — FUROSEMIDE 10 MG/ML
20 INJECTION INTRAMUSCULAR; INTRAVENOUS ONCE
Status: COMPLETED | OUTPATIENT
Start: 2018-01-26 | End: 2018-01-26

## 2018-01-26 RX ORDER — ONDANSETRON 2 MG/ML
4 INJECTION INTRAMUSCULAR; INTRAVENOUS EVERY 8 HOURS PRN
Status: DISCONTINUED | OUTPATIENT
Start: 2018-01-26 | End: 2018-01-29 | Stop reason: HOSPADM

## 2018-01-26 RX ORDER — ONDANSETRON 2 MG/ML
4 INJECTION INTRAMUSCULAR; INTRAVENOUS EVERY 8 HOURS PRN
Status: DISCONTINUED | OUTPATIENT
Start: 2018-01-26 | End: 2018-01-26

## 2018-01-26 RX ADMIN — FUROSEMIDE 20 MG: 10 INJECTION, SOLUTION INTRAMUSCULAR; INTRAVENOUS at 04:01

## 2018-01-26 RX ADMIN — ACETAMINOPHEN 650 MG: 325 TABLET ORAL at 01:01

## 2018-01-26 RX ADMIN — ACETAMINOPHEN 650 MG: 325 TABLET ORAL at 02:01

## 2018-01-26 RX ADMIN — ONDANSETRON 4 MG: 2 INJECTION INTRAMUSCULAR; INTRAVENOUS at 02:01

## 2018-01-26 RX ADMIN — ENOXAPARIN SODIUM 40 MG: 100 INJECTION SUBCUTANEOUS at 04:01

## 2018-01-26 RX ADMIN — LISINOPRIL 40 MG: 20 TABLET ORAL at 11:01

## 2018-01-26 RX ADMIN — AMLODIPINE BESYLATE 10 MG: 10 TABLET ORAL at 11:01

## 2018-01-26 RX ADMIN — INSULIN DETEMIR 15 UNITS: 100 INJECTION, SOLUTION SUBCUTANEOUS at 11:01

## 2018-01-26 RX ADMIN — ACETAMINOPHEN 650 MG: 325 TABLET ORAL at 10:01

## 2018-01-26 RX ADMIN — INSULIN ASPART 6 UNITS: 100 INJECTION, SOLUTION INTRAVENOUS; SUBCUTANEOUS at 11:01

## 2018-01-26 NOTE — PLAN OF CARE
Problem: Physical Therapy Goal  Goal: Physical Therapy Goal  Goals to be met by: 02/10/18    Patient will increase functional independence with mobility by performin. Supine to sit with Lynn  2. Sit to supine with Lynn  3. Sit to stand transfer with Stand-by Assistance  4. Gait  x 40 feet with Contact Guard Assistance using Rolling Walker.   5. Lower extremity exercise program x20 reps per handout, with supervision    Outcome: Outcome(s) achieved Date Met: 18  PT Goals established following initial eval    Orlando Rodriguez, PT  2018

## 2018-01-26 NOTE — PT/OT/SLP EVAL
Physical Therapy Evaluation    Patient Name:  Carin Nicholas   MRN:  [id number]    Recommendations:     Discharge Recommendations:  nursing facility, skilled   Discharge Equipment Recommendations: walker, rolling   Barriers to discharge: Decreased caregiver support    Assessment:     Carin Nicholas is a 88 y.o. female admitted with a medical diagnosis of <principal problem not specified>.  She presents with the following impairments/functional limitations:  weakness, impaired endurance, impaired self care skills, impaired functional mobilty, gait instability, impaired balance. Patient is a pleasant lady of [place] decent. She is fully oriented but pleasantly confused and has short attention. Patient lives alone and reports to being totally independent PTA. Patient now requires significant assistance for out of bed safety. Patient also has poor out of bed endurance. Patient is not safe for self ambulation and will benefit from skilled PT services.     Rehab Prognosis:  fair; patient would benefit from acute skilled PT services to address these deficits and reach maximum level of function.      Recent Surgery: * No surgery found *      Plan:     During this hospitalization, patient to be seen 4 x/week to address the above listed problems via gait training, therapeutic activities, neuromuscular re-education, therapeutic exercises  · Plan of Care Expires:  02/25/18   Plan of Care Reviewed with: patient    Subjective     Communicated with nurse prior to session.  Patient found sitting EOB upon PT entry to room, agreeable to evaluation.      Chief Complaint: gait instability, decreased functional mobility, impaired balance  Patient comments/goals: to get better, return to plof  Pain/Comfort:  · Pain Rating 1: 0/10    Patients cultural, spiritual, Rastafarian conflicts given the current situation: none stated    Living Environment:  Patient lives alone in an apartment, [address]. Patient has no family nearby  Prior to admission,  patients level of function was Independent per patient report.  Patient has the following equipment: cane, straight.  DME owned (not currently used): single point cane.  Upon discharge, patient will have assistance from (no one long term).    Objective:     Patient found with:  (none)     General Precautions: Standard, fall   Orthopedic Precautions:    Braces: N/A     Exams:  · Cognitive Exam:  Patient is oriented to Person, Place, Time and Situation and follows 75% of 2 step commands   · Gross Motor Coordination:  WFL  · Postural Exam:  Patient presented with the following abnormalities:    · -       Rounded shoulders  · Sensation:    · -       Intact  · RLE ROM: WFL  · RLE Strength: WFL  · LLE ROM: WFL  · LLE Strength: WFL   · Patient BLE grossly 3+/5    Functional Mobility:  · Bed Mobility:     · Supine to Sit: contact guard assistance  · Sit to Supine: contact guard assistance  · Transfers:     · Sit to Stand:  minimum assistance with no AD and rolling walker  · Gait: 10' in room x 2 trials  · Max A w/ RW. Patient has decreased step length, decreased weigh shifting ability, requires max verbal cues for attention to task and mod-Max A for balance w/ no AD    AM-PAC 6 CLICK MOBILITY  Total Score:16       Therapeutic Activities and Exercises:   PT Eval completed  Patient assisted w/ in room mobility, educated on RW use    Patient left supine with all lines intact, call button in reach, nurse  notified and PCT present.    GOALS:    Physical Therapy Goals     Not on file          Multidisciplinary Problems (Resolved)        Problem: Physical Therapy Goal    Goal Priority Disciplines Outcome Goal Variances Interventions   Physical Therapy Goal   (Resolved)     PT/OT, PT Outcome(s) achieved     Description:  Goals to be met by: 02/10/18    Patient will increase functional independence with mobility by performin. Supine to sit with [place]  2. Sit to supine with [place]  3. Sit to stand transfer with  Stand-by Assistance  4. Gait  x 40 feet with Contact Guard Assistance using Rolling Walker.   5. Lower extremity exercise program x20 reps per handout, with supervision                      History:     Past Medical History:   Diagnosis Date    Arthritis     Diabetes mellitus, type 2     Hyperlipidemia     Hypertension     Hypothyroidism     Rheumatoid arthritis        Past Surgical History:   Procedure Laterality Date    APPENDECTOMY      HYSTERECTOMY         Clinical Decision Making:     History  Co-morbidities and personal factors that may impact the plan of care Examination  Body Structures and Functions, activity limitations and participation restrictions that may impact the plan of care Clinical Presentation   Decision Making/ Complexity Score   Co-morbidities:   [] Time since onset of injury / illness / exacerbation  [] Status of current condition  []Patient's cognitive status and safety concerns    [] Multiple Medical Problems (see med hx)  Personal Factors:   [] Patient's age  [x] Prior Level of function   [x] Patient's home situation (environment and family support)  [] Patient's level of motivation  [x] Expected progression of patient      HISTORY:(criteria)    [] 52432 - no personal factors/history    [x] 22233 - has 1-2 personal factor/comorbidity     [] 35854 - has >3 personal factor/comorbidity     Body Regions:  [] Objective examination findings  [] Head     []  Neck  [] Trunk   [] Upper Extremity  [] Lower Extremity    Body Systems:  [] For communication ability, affect, cognition, language, and learning style: the assessment of the ability to make needs known, consciousness, orientation (person, place, and time), expected emotional /behavioral responses, and learning preferences (eg, learning barriers, education  needs)  [] For the neuromuscular system: a general assessment of gross coordinated movement (eg, balance, gait, locomotion, transfers, and transitions) and motor function  (motor  control and motor learning)  [] For the musculoskeletal system: the assessment of gross symmetry, gross range of motion, gross strength, height, and weight  [] For the integumentary system: the assessment of pliability(texture), presence of scar formation, skin color, and skin integrity  [] For cardiovascular/pulmonary system: the assessment of heart rate, respiratory rate, blood pressure, and edema     Activity limitations:    [] Patient's cognitive status and saf ety concerns          [] Status of current condition      [] Weight bearing restriction  [] Cardiopulmunary Restriction    Participation Restrictions:   [] Goals and goal agreement with the patient     [] Rehab potential (prognosis) and probable outcome      Examination of Body System: (criteria)    [] 64404 - addressing 1-2 elements    [] 55617 - addressing a total of 3 or more elements     [] 37626 -  Addressing a total of 4 or more elements         Clinical Presentation: (criteria)  Stable - 38638     On examination of body system using standardized tests and measures patient presents with 1-2 elements from any of the following: body structures and functions, activity limitations, and/or participation restrictions.  Leading to a clinical presentation that is considered stable and/or uncomplicated                              Clinical Decision Making  (Eval Complexity):  Low- 02576     Time Tracking:     PT Received On: 01/26/18  PT Start Time: 1037     PT Stop Time: 1108  PT Total Time (min): 31 min     Billable Minutes: Evaluation 8 Min and Therapeutic Activity 15 Min      Orlando Rodriguez, PT  01/26/2018

## 2018-01-26 NOTE — NURSING
Patient arrived to room 951. VS stable, patient alert and oriented to place and  Person only. SR up x3, Callbell in reach; will continue to monitor.

## 2018-01-26 NOTE — PLAN OF CARE
Pt. admitted with history of numerous falls and fatigue. Pt.lives alone. Son Ron is the POA and realizes the need for more care for his mother. He is in the process of obtaining insurance for his mother and plans to move pt. To [place] to live with him. Pt. Is current with  services with [facility]. Pt. May benefit from short term SNF prior to moving with son. PT recs. Pending. SW/CM will continue to follow.    Payor: [employer] MANAGED MEDICARE / Plan: [employer] CHOICES 65 / Product Type: Medicare Advantage /      [id number] - [place], [place] - [address]  [address] [facility] [address]  Phone: [phone] Fax: [phone]      Extended Emergency Contact Information  Primary Emergency Contact: Ron Nicholas  Address: [address] [place] [address] [facility] [place]  Home Phone: [phone]  Mobile Phone: [phone]  Relation: Son        01/26/18 1232   Discharge Assessment   Assessment Type Discharge Planning Assessment   Confirmed/corrected address and phone number on facesheet? Yes   Assessment information obtained from? Patient   Expected Length of Stay (days) 2   Communicated expected length of stay with patient/caregiver yes   Prior to hospitilization cognitive status: Alert/Oriented   Prior to hospitalization functional status: Assistive Equipment   Current cognitive status: Alert/Oriented   Current Functional Status: Assistive Equipment   Lives With alone   Able to Return to Prior Arrangements yes   Is patient able to care for self after discharge? No  (It is becoming more difficult for pt. to care for self. lives alone)   Who are your caregiver(s) and their phone number(s)? Roberto Velasquez [phone]   Patient's perception of discharge disposition home or selfcare   Readmission Within The Last 30 Days no previous admission in last 30 days   Patient currently being followed by outpatient case management? No   Patient currently  receives any other outside agency services? Yes   Is it the patient/care giver preference to resume care with the current outside agency? (Home health with [facility])   Equipment Currently Used at Home cane, straight;walker, rolling   Do you have any problems affording any of your prescribed medications? No   Is the patient taking medications as prescribed? yes   Does the patient have transportation home? Yes   Transportation Available family or friend will provide   Does the patient receive services at the [facility]? No   Discharge Plan A Home;Home Health   Discharge Plan B Home

## 2018-01-26 NOTE — PLAN OF CARE
Future Appointments  Date Time Provider Department Center   2/1/2018 10:40 AM Arash Espinoza DO [facility] IM [place]

## 2018-01-26 NOTE — PROGRESS NOTES
"[facility] Hospital Medicine  Progress Note    Patient Name: Carin Nicholas  MRN: [id number]  Patient Class: OP- Observation   Admission Date: 1/25/2018  Length of Stay: 0 days  Attending Physician: Rao Fox MD  Primary Care Provider: Arash Espinoza DO    [facility] Medicine Team: [facility] HOSP MED A Rao Fox MD    Subjective:     Principal Problem:<principal problem not specified>    HPI: Carin Nicholas is a 88 y.o. female presenting with weakness and fatigue. Pt states that she felt weak at home and thought she might "pass out." She states that appetite has been poor for 2 days.     1/26:Waxing and waning mental status w/ bouts of clear english speaking, clarity and understanding of need for SNF placement mixed w/ rapid Yakut speaking, and asking to leave, stating she is being held against her will.     Review of Systems  Objective:     Vital Signs (Most Recent):  Temp: 98.3 °F (36.8 °C) (01/26/18 1158)  Pulse: 93 (01/26/18 1446)  Resp: 17 (01/26/18 1158)  BP: (!) 181/74 (Medications given see MAR) (01/26/18 1158)  SpO2: 97 % (01/26/18 1158) Vital Signs (24h Range):  Temp:  [97.4 °F (36.3 °C)-98.5 °F (36.9 °C)] 98.3 °F (36.8 °C)  Pulse:  [] 93  Resp:  [16-18] 17  SpO2:  [96 %-98 %] 97 %  BP: (128-181)/(60-96) 181/74     Weight: 71.3 kg (157 lb 3 oz)  Body mass index is 30.7 kg/m².  No intake or output data in the 24 hours ending 01/26/18 1624   Physical Exam  Nursing note and vitals reviewed.  Constitutional: She appears well-developed and well-nourished.   HENT:   Head: Normocephalic and atraumatic.   Eyes: Conjunctivae are normal. Pupils are equal, round, and reactive to light.   Neck: Normal range of motion. Neck supple.   Cardiovascular: Normal rate, regular rhythm, normal heart sounds and intact distal pulses. Exam reveals no gallop and no friction rub.   No murmur heard.  Pulmonary/Chest: Breath sounds normal. No respiratory distress. She has no wheezes. She has no rhonchi. " She has no rales. She exhibits no tenderness.   Abdominal: Soft. Bowel sounds are normal. She exhibits no distension. There is no tenderness. There is no rebound and no guarding. Fluid   Musculoskeletal: Normal range of motion. She exhibits edema (1+ BLE). She exhibits no tenderness.   Neurological: She is alert. She has mostly normal strength, some weakness on L hip extension but she says 2/2 pain. She is intermittently oriented to person, place and January. No cranial nerve deficit or sensory deficit. GCS is 15  Skin: Skin is warm. Bruise on medial side of R knee, and R arm forearm  Psychiatric: Thought content normal. Her speech is slowed. Cognition and memory are impaired.    Significant Labs: All pertinent labs within the past 24 hours have been reviewed.    Significant Imaging: I have reviewed and interpreted all pertinent imaging results/findings within the past 24 hours.    Assessment/Plan:      Active Diagnoses:    Diagnosis Date Noted POA    Fatigue [R53.83] 01/25/2018 Yes      Problems Resolved During this Admission:    Diagnosis Date Noted Date Resolved POA     VTE Risk Mitigation         Ordered     enoxaparin injection 40 mg  Daily     Route:  Subcutaneous        01/25/18 1701     Medium Risk of VTE  Once      01/25/18 1701         Subjective weakness, Recurrent falls  Pt lives alone and feels unsteady on her feet. Good strength. Bruising from prior falls. Recent admit for fall. Son lives in [place] and they working on relocating her there. -possibly 2/2 to spinal stenosis vs orthostatic hypotension vs gait instability (neuropathy, debility). Pt not taking Dm, thyroid or HTN meds. Some concern for diastolic heart failure based on exam. BNP nl. B12 wnl  -TTE: indeterminate diastolic dysfunc, PAP 36, pulmonary hypertension  -PT/OT eval, SNF placement         Spinal stenosis     -MRI at last admit: significant multilevel stenosis worst L3-L5. No fracture. neuro surgery evaluated pt; appreciate  assistance                           Dementia     -fall precautions  -son is POA and understands that patient is heading towards needing more assistance in caring for self, agreeable to SNF this time  - Son states that he will start the process of [place] so that she can move to [place], where he lives  -appears to have changes in mental status. Sitter ordered. Son says this is not unusual for her.           Urge incontinence     -chronic problem, but no complaints today  -Rx for mirabregron 25 mg PO qhs  -UA neg for UTI. Cx NG       Essential hypertension     -continue home Lisinopril and norvasc        Type 2 diabetes mellitus with diabetic polyneuropathy     -A1C 8.9 5/2017; home meds novolog 70/30 16U AM and 8U qhs  -levemir 15u given in Ed, inc to 20u, will continue moderate dose sliding scale       Hypothyroidism     -synthroid home med, but unclear compliance. TSH nl. Will hold off for now.          Rao Fox MD  Department of Hospital Medicine   [facility]

## 2018-01-26 NOTE — PROGRESS NOTES
2000.  Patient is alert and oriented x 3.  Patient requesting to talk to her son, but her cell phone battery is dead.  Nurse obtained a phone  for patient's phone.  2100  Patient asking  for her cell phone to call her son.  Nurse reported to bedside and checked patient's phone for charging status.  Patient was able to recall her son's phone number except for the area code.  0300  Patient has been fine throughout the shift, until now.  Patient stated that she has been calling for an hour for help with a headache and to use the bedpan.  Nurse checked the call light log and found that it had only been a few minutes since she used the call bell.  Patient complaining of nausea.  Paged ARMANI to get nausea medication and patient complaining about the time she had to wait.  Patient obviously confused.  0500  Notified ARMANILETA WAGNER,about patient confusion, high BP, and that patient initially refused the labwork draw.    0530  Patient is now oriented only to self.  Patient trying to get out of bed despite several nurses attempts to explain fall precautions.  Reapplied telemetry box.

## 2018-01-26 NOTE — PLAN OF CARE
12:55 PM  SW received notification that pt will need SNF at discharge. Called son who stated his preferences are [facility], [facility] and [facility] Faxed referrals to facilities. Will f/u as needed.      Paulina Ordoñez LMSW   [facility]  Ext [phone]

## 2018-01-26 NOTE — PT/OT/SLP EVAL
Occupational Therapy   Evaluation and Treatment    Name: Carin Nicholas  MRN: [id number]  Admitting Diagnosis:  Fatigue    Recommendations:     Discharge Recommendations: nursing facility, skilled  Discharge Equipment Recommendations:  walker, rolling  Barriers to discharge:  Decreased caregiver support    History:     Occupational Profile:  Living Environment: pt lives alone in a first floor apartment  Previous level of function: (I), SPC ambulation  Roles and Routines: friend  Equipment Owned:  cane, straight  Assistance upon Discharge: not available    Past Medical History:   Diagnosis Date    Arthritis     Diabetes mellitus, type 2     Hyperlipidemia     Hypertension     Hypothyroidism     Rheumatoid arthritis        Past Surgical History:   Procedure Laterality Date    APPENDECTOMY      HYSTERECTOMY         Subjective     Chief Complaint: wants to go home  Patient/Family stated goals: go home  Communicated with: RN prior to session.  Pain/Comfort:  · Pain Rating 1: 0/10  · Pain Rating Post-Intervention 1: 0/10    Patients cultural, spiritual, Temple conflicts given the current situation: none stated    Objective:     Patient found with:  (none)    General Precautions: Standard, fall   Orthopedic Precautions:N/A   Braces: N/A     Occupational Performance:    Bed Mobility:    · Patient completed Supine to Sit with stand by assistance  · Patient completed Sit to Supine with stand by assistance    Functional Mobility/Transfers:  · Patient completed Sit <> Stand Transfer with contact guard assistance  with  rolling walker   · Patient completed Bed <> Chair Transfer using Stand Pivot technique with minimum assistance with rolling walker  · Functional Mobility: Min A with RW    Activities of Daily Living:  · LB Dressing: maximal assistance able to doff socks with max effort; assist to don  · Toileting: total assistance bed pan, pericare    Cognitive/Visual Perceptual:  Cognitive/Psychosocial Skills:     -        Oriented to: Person, Time and Situation   -       Follows Commands/attention:Follows two-step commands, but inattentive  -       Communication: clear/fluent  -       Memory: no family present, but pt seems WFL  -       Safety awareness/insight to disability: impaired   -       Mood/Affect/Coping skills/emotional control: Appropriate to situation  Visual/Perceptual:      -Intact    Physical Exam:  Balance:    -       Fair  Postural examination/scapula alignment:    -       Rounded shoulders  -       Forward head  Dominant hand:    -       R  Upper Extremity Range of Motion:     -       Right Upper Extremity: 90 degrees at shoulder  -       Left Upper Extremity: same  Upper Extremity Strength:    -       Right Upper Extremity: WFL  -       Left Upper Extremity: WFL   Strength:    -       Right Upper Extremity: WFL  -       Left Upper Extremity: WFL  Fine Motor Coordination:    -       Intact  Gross motor coordination:   WFL    Patient left supine with all lines intact, call button in reach and PCT/sitter present    [facility] 6 Click:  [facility] Total Score: 15    Treatment & Education:  Pt ed re OT role and POC. Pt found sitting EOB. Pt performed strength and ROM testing. Pt required Max a for socks. Pt stood with CGA and RW and ambulated with min A and RW. Pt returned to supine with SBA, but required CGA to roll and place bed pan, for Total A pericare.  Education:    Assessment:     Carin Nicholas is a 88 y.o. female with a medical diagnosis of fatigue.  She presents with the following performance deficits affecting function are weakness, impaired endurance, impaired self care skills, impaired functional mobilty, gait instability, impaired balance, decreased safety awareness, decreased ROM, decreased lower extremity function, decreased upper extremity function.  Pt participates well and is motivated to regain functional independence. Pt lives alone and would be unable to care for herself at this time. Pt would benefit from  "an inpatient stay to improve strength, endurance and balance, as well as to improve modification of tasks for safer performance of self care tasks.    Rehab Prognosis:  Good; patient would benefit from acute skilled OT services to address these deficits and reach maximum level of function.         Clinical Decision Makin.  OT Mod:  "Pt evaluation falls under moderate complexity for evaluation coding due to identification of 3-5 performance deficits noted as stated above. Eval required Min/Mod assistance to complete on this date and detailed assessment(s) were utilized. Moreover, an expanded review of history and occupational profile obtained with additional review of cognitive, physical and psychosocial hx."     Plan:     Patient to be seen 3 x/week to address the above listed problems via self-care/home management, therapeutic activities, therapeutic exercises  · Plan of Care Expires: 18  · Plan of Care Reviewed with: patient    This Plan of care has been discussed with the patient who was involved in its development and understands and is in agreement with the identified goals and treatment plan    GOALS:    Occupational Therapy Goals        Problem: Occupational Therapy Goal    Goal Priority Disciplines Outcome Interventions   Occupational Therapy Goal     OT, PT/OT Ongoing (interventions implemented as appropriate)    Description:  Goals to be met by: 2/10/18     Patient will increase functional independence with ADLs by performing:    UE Dressing with Stand-by Assistance.  LE Dressing with Minimal Assistance.  Grooming while standing at sink with Contact Guard Assistance.  Toileting from toilet with Minimal Assistance for hygiene and clothing management.   Supine to sit with Supervision.  Stand pivot transfers with Contact Guard Assistance.  Toilet transfer to toilet with Contact Guard Assistance.                      Time Tracking:     OT Date of Treatment: 18  OT Start Time: 1037  OT Stop " Time: 1108  OT Total Time (min): 31 min    Billable Minutes:Evaluation 8 minutes  Self Care/Home Management 8 minutes    ROMAN Kevin  1/26/2018  Pager: [phone]

## 2018-01-26 NOTE — PLAN OF CARE
Problem: Occupational Therapy Goal  Goal: Occupational Therapy Goal  Goals to be met by: 2/10/18     Patient will increase functional independence with ADLs by performing:    UE Dressing with Stand-by Assistance.  LE Dressing with Minimal Assistance.  Grooming while standing at sink with Contact Guard Assistance.  Toileting from toilet with Minimal Assistance for hygiene and clothing management.   Supine to sit with Supervision.  Stand pivot transfers with Contact Guard Assistance.  Toilet transfer to toilet with Contact Guard Assistance.    Outcome: Ongoing (interventions implemented as appropriate)  OT eval completed. The above goals are established to improve functional (I) and mobility.    ROMAN Kevin  1/26/2018  Pager: [phone]

## 2018-01-27 LAB
POCT GLUCOSE: 156 MG/DL (ref 70–110)
POCT GLUCOSE: 169 MG/DL (ref 70–110)
POCT GLUCOSE: 180 MG/DL (ref 70–110)
POCT GLUCOSE: 202 MG/DL (ref 70–110)
POCT GLUCOSE: 229 MG/DL (ref 70–110)

## 2018-01-27 PROCEDURE — 63600175 PHARM REV CODE 636 W HCPCS: Performed by: HOSPITALIST

## 2018-01-27 PROCEDURE — G0378 HOSPITAL OBSERVATION PER HR: HCPCS

## 2018-01-27 PROCEDURE — 25000003 PHARM REV CODE 250: Performed by: HOSPITALIST

## 2018-01-27 PROCEDURE — 99224 PR SUBSEQUENT OBSERVATION CARE,LEVEL I: CPT | Mod: ,,, | Performed by: HOSPITALIST

## 2018-01-27 PROCEDURE — 63600175 PHARM REV CODE 636 W HCPCS: Performed by: NURSE PRACTITIONER

## 2018-01-27 RX ORDER — KETOROLAC TROMETHAMINE 15 MG/ML
15 INJECTION, SOLUTION INTRAMUSCULAR; INTRAVENOUS ONCE
Status: COMPLETED | OUTPATIENT
Start: 2018-01-27 | End: 2018-01-27

## 2018-01-27 RX ADMIN — INSULIN ASPART 1 UNITS: 100 INJECTION, SOLUTION INTRAVENOUS; SUBCUTANEOUS at 01:01

## 2018-01-27 RX ADMIN — LISINOPRIL 40 MG: 20 TABLET ORAL at 08:01

## 2018-01-27 RX ADMIN — ACETAMINOPHEN 650 MG: 325 TABLET ORAL at 06:01

## 2018-01-27 RX ADMIN — INSULIN ASPART 2 UNITS: 100 INJECTION, SOLUTION INTRAVENOUS; SUBCUTANEOUS at 06:01

## 2018-01-27 RX ADMIN — KETOROLAC TROMETHAMINE 15 MG: 15 INJECTION, SOLUTION INTRAMUSCULAR; INTRAVENOUS at 05:01

## 2018-01-27 RX ADMIN — ENOXAPARIN SODIUM 40 MG: 100 INJECTION SUBCUTANEOUS at 05:01

## 2018-01-27 RX ADMIN — AMLODIPINE BESYLATE 10 MG: 10 TABLET ORAL at 08:01

## 2018-01-27 RX ADMIN — ACETAMINOPHEN 650 MG: 325 TABLET ORAL at 09:01

## 2018-01-27 RX ADMIN — INSULIN ASPART 2 UNITS: 100 INJECTION, SOLUTION INTRAVENOUS; SUBCUTANEOUS at 01:01

## 2018-01-27 RX ADMIN — ACETAMINOPHEN 650 MG: 325 TABLET ORAL at 04:01

## 2018-01-27 RX ADMIN — INSULIN ASPART 1 UNITS: 100 INJECTION, SOLUTION INTRAVENOUS; SUBCUTANEOUS at 11:01

## 2018-01-27 RX ADMIN — INSULIN ASPART 4 UNITS: 100 INJECTION, SOLUTION INTRAVENOUS; SUBCUTANEOUS at 09:01

## 2018-01-27 NOTE — PLAN OF CARE
Problem: Patient Care Overview  Goal: Plan of Care Review  Outcome: Ongoing (interventions implemented as appropriate)  Patient remains confused to time, situation, place requiring sitter at bedside, unsteady of gait, remaining BR, cooperative and calm.

## 2018-01-27 NOTE — PLAN OF CARE
Problem: Patient Care Overview  Goal: Plan of Care Review  Outcome: Ongoing (interventions implemented as appropriate)  POC reviewed with pt and family.  Pt verbalized understanding.  Questions and concerns addressed, and no acute events today.  Pt progressing toward goals, will continue to monitor.  See flowsheets for full assessment and VS info.

## 2018-01-27 NOTE — PROGRESS NOTES
"[facility] Hospital Medicine  Progress Note    Patient Name: Carin Nicholas  MRN: [id number]  Patient Class: OP- Observation   Admission Date: 1/25/2018  Length of Stay: 0 days  Attending Physician: Rao Fox MD  Primary Care Provider: Arash Espinoza DO    [facility] Medicine Team: [facility] HOSP MED A Rao Fox MD    Subjective:     Principal Problem:<principal problem not specified>    HPI: Carin Nicholas is a 88 y.o. female presenting with weakness and fatigue. Pt states that she felt weak at home and thought she might "pass out." She states that appetite has been poor for 2 days.     1/26:Waxing and waning mental status w/ bouts of clear english speaking, clarity and understanding of need for SNF placement mixed w/ rapid Romanian speaking, and asking to leave, stating she is being held against her will.     1/27: NAEON.     Review of Systems  Objective:     Vital Signs (Most Recent):  Temp: 98.6 °F (37 °C) (01/27/18 0725)  Pulse: 98 (01/27/18 0725)  Resp: 16 (01/27/18 0725)  BP: (!) 144/79 (01/27/18 0725)  SpO2: (!) 93 % (01/27/18 0725) Vital Signs (24h Range):  Temp:  [96.9 °F (36.1 °C)-98.9 °F (37.2 °C)] 98.6 °F (37 °C)  Pulse:  [] 98  Resp:  [16-22] 16  SpO2:  [93 %-99 %] 93 %  BP: (122-181)/(57-83) 144/79     Weight: 71.3 kg (157 lb 3 oz)  Body mass index is 30.7 kg/m².  No intake or output data in the 24 hours ending 01/27/18 0803   Physical Exam  Nursing note and vitals reviewed.  Constitutional: She appears well-developed and well-nourished.   HENT:   Head: Normocephalic and atraumatic.   Eyes: Conjunctivae are normal. Pupils are equal, round, and reactive to light.   Neck: Normal range of motion. Neck supple.   Cardiovascular: Normal rate, regular rhythm, normal heart sounds and intact distal pulses. Exam reveals no gallop and no friction rub.   No murmur heard.  Pulmonary/Chest: Breath sounds normal. No respiratory distress. She has no wheezes. She has no rhonchi. She has no " rales. She exhibits no tenderness.   Abdominal: Soft. Bowel sounds are normal. She exhibits no distension. There is no tenderness. There is no rebound and no guarding. Fluid   Musculoskeletal: Normal range of motion. She exhibits edema (1+ BLE). She exhibits no tenderness.   Neurological: She is alert. She has mostly normal strength, some weakness on L hip extension but she says 2/2 pain. She is intermittently oriented to person, place and January. No cranial nerve deficit or sensory deficit. GCS is 15  Skin: Skin is warm. Bruise on medial side of R knee, and R arm forearm  Psychiatric: Thought content normal. Her speech is slowed. Cognition and memory are impaired.    Significant Labs: All pertinent labs within the past 24 hours have been reviewed.    Significant Imaging: I have reviewed and interpreted all pertinent imaging results/findings within the past 24 hours.    Assessment/Plan:      Active Diagnoses:    Diagnosis Date Noted POA    Fatigue [R53.83] 01/25/2018 Yes      Problems Resolved During this Admission:    Diagnosis Date Noted Date Resolved POA     VTE Risk Mitigation         Ordered     enoxaparin injection 40 mg  Daily     Route:  Subcutaneous        01/25/18 1701     Medium Risk of VTE  Once      01/25/18 1701         Subjective weakness, Recurrent falls  Pt lives alone and feels unsteady on her feet. Good strength. Bruising from prior falls. Recent admit for fall. Son lives in [place] and they working on relocating her there. -possibly 2/2 to spinal stenosis vs orthostatic hypotension vs gait instability (neuropathy, debility). Pt not taking Dm, thyroid or HTN meds. Some concern for diastolic heart failure based on exam. BNP nl. B12 wnl  -TTE: indeterminate diastolic dysfunc, PAP 36, pulmonary hypertension. 1 dose of IV lasix given w/ improvement in Bp.   -PT/OT eval, SNF placement         Spinal stenosis     -MRI at last admit: significant multilevel stenosis worst L3-L5. No fracture. neuro  surgery evaluated pt; appreciate assistance                           Dementia     -fall precautions  -son is POA and understands that patient is heading towards needing more assistance in caring for self, agreeable to SNF this time  - Son states that he will start the process of switching her [place] so that she can move to [place], where he lives  -appears to have changes in mental status. Sitter ordered. Son says this is not unusual for her.           Urge incontinence     -chronic problem, but no complaints today  -Rx for mirabregron 25 mg PO qhs  -UA neg for UTI. Cx NG       Essential hypertension     -continue home Lisinopril and norvasc        Type 2 diabetes mellitus with diabetic polyneuropathy     -A1C 8.9 5/2017; home meds novolog 70/30 16U AM and 8U qhs  -levemir 15u given in Ed, inc to 20u, will continue moderate dose sliding scale       Hypothyroidism     -synthroid home med, but unclear compliance. TSH, T4 nl since 2014. Will hold off for now.          Rao Fox MD  Department of Hospital Medicine   [facility]

## 2018-01-28 LAB
POCT GLUCOSE: 145 MG/DL (ref 70–110)
POCT GLUCOSE: 179 MG/DL (ref 70–110)
POCT GLUCOSE: 212 MG/DL (ref 70–110)
POCT GLUCOSE: 292 MG/DL (ref 70–110)

## 2018-01-28 PROCEDURE — 99225 PR SUBSEQUENT OBSERVATION CARE,LEVEL II: CPT | Mod: ,,, | Performed by: HOSPITALIST

## 2018-01-28 PROCEDURE — 63600175 PHARM REV CODE 636 W HCPCS: Performed by: HOSPITALIST

## 2018-01-28 PROCEDURE — G0378 HOSPITAL OBSERVATION PER HR: HCPCS

## 2018-01-28 PROCEDURE — 25000003 PHARM REV CODE 250: Performed by: NURSE PRACTITIONER

## 2018-01-28 PROCEDURE — 25000003 PHARM REV CODE 250: Performed by: HOSPITALIST

## 2018-01-28 RX ORDER — HYDROCHLOROTHIAZIDE 12.5 MG/1
12.5 TABLET ORAL DAILY
Status: DISCONTINUED | OUTPATIENT
Start: 2018-01-28 | End: 2018-01-29 | Stop reason: HOSPADM

## 2018-01-28 RX ORDER — RAMELTEON 8 MG/1
8 TABLET ORAL NIGHTLY PRN
Status: DISCONTINUED | OUTPATIENT
Start: 2018-01-28 | End: 2018-01-29 | Stop reason: HOSPADM

## 2018-01-28 RX ADMIN — RAMELTEON 8 MG: 8 TABLET, FILM COATED ORAL at 11:01

## 2018-01-28 RX ADMIN — INSULIN ASPART 2 UNITS: 100 INJECTION, SOLUTION INTRAVENOUS; SUBCUTANEOUS at 09:01

## 2018-01-28 RX ADMIN — AMLODIPINE BESYLATE 10 MG: 10 TABLET ORAL at 08:01

## 2018-01-28 RX ADMIN — INSULIN ASPART 2 UNITS: 100 INJECTION, SOLUTION INTRAVENOUS; SUBCUTANEOUS at 08:01

## 2018-01-28 RX ADMIN — HYDROCHLOROTHIAZIDE 12.5 MG: 12.5 TABLET ORAL at 11:01

## 2018-01-28 RX ADMIN — ACETAMINOPHEN 650 MG: 325 TABLET ORAL at 09:01

## 2018-01-28 RX ADMIN — INSULIN ASPART 6 UNITS: 100 INJECTION, SOLUTION INTRAVENOUS; SUBCUTANEOUS at 01:01

## 2018-01-28 RX ADMIN — LISINOPRIL 40 MG: 20 TABLET ORAL at 08:01

## 2018-01-28 RX ADMIN — ACETAMINOPHEN 650 MG: 325 TABLET ORAL at 12:01

## 2018-01-28 RX ADMIN — ACETAMINOPHEN 650 MG: 325 TABLET ORAL at 08:01

## 2018-01-28 RX ADMIN — ENOXAPARIN SODIUM 40 MG: 100 INJECTION SUBCUTANEOUS at 05:01

## 2018-01-28 NOTE — PLAN OF CARE
Problem: Patient Care Overview  Goal: Plan of Care Review  Outcome: Ongoing (interventions implemented as appropriate)  No acute events overnight. Alert and oriented to self when awake. Patient understands and agrees with plan of care. Vitals stable and within patient's baseline since admission on room air. Diligent coughing, deep breathing and incentive spirometer use encouraged. Pain controlled on current regimen. No nausea reported overnight. Urine output adequate overnight. No BM overnight. Instructed to call for help as needed, call light in reach. Bed in lowest position and brake set. Frequent rounds made for patient's safety. See flowsheets for detailed assessment. White board in patient's room updated accordingly. Continuing to monitor closely.

## 2018-01-29 VITALS
OXYGEN SATURATION: 96 % | HEART RATE: 92 BPM | WEIGHT: 157.19 LBS | RESPIRATION RATE: 20 BRPM | TEMPERATURE: 98 F | BODY MASS INDEX: 30.86 KG/M2 | SYSTOLIC BLOOD PRESSURE: 120 MMHG | HEIGHT: 60 IN | DIASTOLIC BLOOD PRESSURE: 70 MMHG
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
## 2018-01-29 LAB
POCT GLUCOSE: 127 MG/DL (ref 70–110)
POCT GLUCOSE: 168 MG/DL (ref 70–110)
POCT GLUCOSE: 230 MG/DL (ref 70–110)

## 2018-01-29 PROCEDURE — 97535 SELF CARE MNGMENT TRAINING: CPT | Mod: 59

## 2018-01-29 PROCEDURE — G8988 SELF CARE GOAL STATUS: HCPCS | Mod: CJ

## 2018-01-29 PROCEDURE — 25000003 PHARM REV CODE 250: Performed by: HOSPITALIST

## 2018-01-29 PROCEDURE — 63600175 PHARM REV CODE 636 W HCPCS: Performed by: HOSPITALIST

## 2018-01-29 PROCEDURE — 99219 PR INITIAL OBSERVATION CARE,LEVL II: CPT | Mod: ,,, | Performed by: HOSPITALIST

## 2018-01-29 PROCEDURE — G8987 SELF CARE CURRENT STATUS: HCPCS | Mod: CK

## 2018-01-29 PROCEDURE — G0378 HOSPITAL OBSERVATION PER HR: HCPCS

## 2018-01-29 PROCEDURE — 97530 THERAPEUTIC ACTIVITIES: CPT

## 2018-01-29 PROCEDURE — 86580 TB INTRADERMAL TEST: CPT | Performed by: HOSPITALIST

## 2018-01-29 RX ORDER — HYDROCHLOROTHIAZIDE 12.5 MG/1
12.5 TABLET ORAL DAILY
Qty: 30 TABLET | Refills: 11 | Status: SHIPPED | OUTPATIENT
Start: 2018-01-30 | End: 2019-01-30

## 2018-01-29 RX ORDER — SOLIFENACIN SUCCINATE 5 MG/1
5 TABLET, FILM COATED ORAL DAILY
Status: DISCONTINUED | OUTPATIENT
Start: 2018-01-29 | End: 2018-01-29 | Stop reason: HOSPADM

## 2018-01-29 RX ORDER — AMLODIPINE BESYLATE 10 MG/1
10 TABLET ORAL DAILY
Qty: 30 TABLET | Refills: 11 | Status: SHIPPED | OUTPATIENT
Start: 2018-01-30 | End: 2019-01-30

## 2018-01-29 RX ADMIN — Medication 5 UNITS: at 12:01

## 2018-01-29 RX ADMIN — INSULIN ASPART 2 UNITS: 100 INJECTION, SOLUTION INTRAVENOUS; SUBCUTANEOUS at 09:01

## 2018-01-29 RX ADMIN — HYDROCHLOROTHIAZIDE 12.5 MG: 12.5 TABLET ORAL at 09:01

## 2018-01-29 RX ADMIN — ENOXAPARIN SODIUM 40 MG: 100 INJECTION SUBCUTANEOUS at 05:01

## 2018-01-29 RX ADMIN — LISINOPRIL 40 MG: 20 TABLET ORAL at 09:01

## 2018-01-29 RX ADMIN — ACETAMINOPHEN 650 MG: 325 TABLET ORAL at 09:01

## 2018-01-29 RX ADMIN — AMLODIPINE BESYLATE 10 MG: 10 TABLET ORAL at 09:01

## 2018-01-29 RX ADMIN — SOLIFENACIN SUCCINATE 5 MG: 5 TABLET, FILM COATED ORAL at 12:01

## 2018-01-29 NOTE — PLAN OF CARE
2:08 PM   requesting SNF authorization. Faxed authorization request to [facility]. Informed Deedee with N to inform her of auth request.       Paulina Ordoñez LMSW   [facility]  Ext [phone]

## 2018-01-29 NOTE — PLAN OF CARE
3:46 PM  SW received notification from Levi with [facility] that nurse can call report to  [phone] to skilled nursing unit.     Arranged transportation with [facility]. [facility] will arrive at 5:30PM.     Informed RN, Donna.     Paulina Ordoñez, CHAKA   [facility]  Ext [phone]

## 2018-01-29 NOTE — PLAN OF CARE
Problem: Occupational Therapy Goal  Goal: Occupational Therapy Goal  Goals to be met by: 2/10/18     Patient will increase functional independence with ADLs by performing:    UE Dressing with Stand-by Assistance.  LE Dressing with Minimal Assistance.  Grooming while standing at sink with Contact Guard Assistance.  Toileting from toilet with Minimal Assistance for hygiene and clothing management.   Supine to sit with Supervision.  Stand pivot transfers with Contact Guard Assistance.  Toilet transfer to toilet with Contact Guard Assistance.     Continue OT POC    Comments: Nicolas Parikh OTR/L  1/29/2018

## 2018-01-29 NOTE — NURSING
NOTIFIED PATIENT'S SON OF PATIENT'S DISCHARGE. DISCHARGE INSTRUCTION REVIEWED WITH PATIENT'S SON. UNDERSTANDING VERBALIZED. IV REMOVED. SITE WITHIN NORMAL LIMITS. TELEMETRY MONITOR REMOVED. DISCHARGE INSTRUCTIONS AND PAPER PRESCRIPTION GIVEN TO PATIENT. TIME GIVEN FOR QUESTIONS, COMMENTS AND CONCERNS. PATIENT WILL BE TRANSFERRED TO [facility] NURSING [facility]IA [facility]SP[facility]. REPORT CALLED TO RECEIVING NURSE, JOHN. PROPER PAPERWORK SENT WITH PATIENT INFORMATION.

## 2018-01-29 NOTE — NURSING
PATIENT C/O INCOMPLETE EMPTYING OF BLADDER. BLADDER SCAN REVEALED 168 ML IN BLADDER POST VOID. MD MADE AWARE AND VERBALIZED UNDERSTANDING AND STATED THAT THE PATIENT HAS BEEN STARTED ON [facility]. WILL CONTINUE TO MONITOR PATIENT'S STATUS AND PERFORM INTERVENTIONS WHEN APPLICABLE.

## 2018-01-29 NOTE — PROGRESS NOTES
"[facility] Hospital Medicine  Progress Note    Patient Name: Carin Nicholas  MRN: [id number]  Patient Class: OP- Observation   Admission Date: 1/25/2018  Length of Stay: 0 days  Attending Physician: Rao Fox MD  Primary Care Provider: Arash Espinoza DO    [facility] Medicine Team: [facility] MED A Rao Fox MD    Subjective:     Principal Problem:<principal problem not specified>    HPI: Carin Nicholas is a 88 y.o. female presenting with weakness and fatigue. Pt states that she felt weak at home and thought she might "pass out." She states that appetite has been poor for 2 days.     1/26:Waxing and waning mental status w/ bouts of clear english speaking, clarity and understanding of need for SNF placement mixed w/ rapid Telugu speaking, and asking to leave, stating she is being held against her will.     1/27: NAEON. Oriented    1/28: oriented, no complaints.     Review of Systems  Objective:     Vital Signs (Most Recent):  Temp: 98.6 °F (37 °C) (01/28/18 1519)  Pulse: 91 (01/28/18 1519)  Resp: 17 (01/28/18 1519)  BP: (!) 166/76 (01/28/18 1519)  SpO2: (!) 94 % (01/28/18 1519) Vital Signs (24h Range):  Temp:  [98.1 °F (36.7 °C)-99.1 °F (37.3 °C)] 98.6 °F (37 °C)  Pulse:  [] 91  Resp:  [17-18] 17  SpO2:  [91 %-96 %] 94 %  BP: (143-166)/() 166/76     Weight: 71.3 kg (157 lb 3 oz)  Body mass index is 30.7 kg/m².    Intake/Output Summary (Last 24 hours) at 01/28/18 1846  Last data filed at 01/28/18 1700   Gross per 24 hour   Intake              960 ml   Output                0 ml   Net              960 ml      Physical Exam  Nursing note and vitals reviewed.  Constitutional: She appears well-developed and well-nourished.   HENT:   Head: Normocephalic and atraumatic.   Eyes: Conjunctivae are normal. Pupils are equal, round, and reactive to light.   Neck: Normal range of motion. Neck supple.   Cardiovascular: Normal rate, regular rhythm, normal heart sounds and intact distal pulses. " Exam reveals no gallop and no friction rub.   No murmur heard.  Pulmonary/Chest: Breath sounds normal. No respiratory distress. She has no wheezes. She has no rhonchi. She has no rales. She exhibits no tenderness.   Abdominal: Soft. Bowel sounds are normal. She exhibits no distension. There is no tenderness. There is no rebound and no guarding. Fluid   Musculoskeletal: Normal range of motion. She exhibits edema (1+ BLE) improving. She exhibits no tenderness.   Neurological: She is alert. She has mostly normal strength, some weakness on L hip extension but she says 2/2 pain. She is intermittently oriented to person, place and January. No cranial nerve deficit or sensory deficit. GCS is 15  Skin: Skin is warm. Bruise on medial side of R knee, and R arm forearm  Psychiatric: Thought content normal. Her speech is slowed. Cognition and memory are impaired.    Significant Labs: All pertinent labs within the past 24 hours have been reviewed.    Significant Imaging: I have reviewed and interpreted all pertinent imaging results/findings within the past 24 hours.    Assessment/Plan:      Active Diagnoses:    Diagnosis Date Noted POA    Fatigue [R53.83] 01/25/2018 Yes      Problems Resolved During this Admission:    Diagnosis Date Noted Date Resolved POA     VTE Risk Mitigation         Ordered     enoxaparin injection 40 mg  Daily     Route:  Subcutaneous        01/25/18 1701     Medium Risk of VTE  Once      01/25/18 1701        Subjective weakness, Recurrent falls  Pt lives alone and feels unsteady on her feet. Good strength. Bruising from prior falls. Recent admit for fall. Son lives in [place] and they working on relocating her there. -possibly 2/2 to spinal stenosis vs orthostatic hypotension vs gait instability (neuropathy, debility). Pt not taking Dm, thyroid or HTN meds. Some concern for diastolic heart failure based on exam. BNP nl. B12 wnl  -TTE: indeterminate diastolic dysfunc, PAP 36, pulmonary hypertension. 1 dose  of IV lasix given w/ improvement in Bp.   -PT/OT eval, SNF placement         Spinal stenosis     -MRI at last admit: significant multilevel stenosis worst L3-L5. No fracture. neuro surgery evaluated pt; appreciate assistance                           Dementia     -fall precautions  -son is POA and understands that patient is heading towards needing more assistance in caring for self, agreeable to SNF this time  - Son states that he will start the process of [place] so that she can move to [place], where he lives  -appears to have changes in mental status. Sitter ordered. Son says this is not unusual for her.           Urge incontinence     -chronic problem, but no complaints today  -Rx for mirabregron 25 mg PO qhs  -UA neg for UTI. Cx NG       Essential hypertension     -continue home Lisinopril and norvasc. Added hctz       Type 2 diabetes mellitus with diabetic polyneuropathy     -A1C 8.9 5/2017; home meds novolog 70/30 16U AM and 8U qhs  -levemir 15u given in [facility] 1/27, 1/28, will continue moderate dose sliding scale       Hypothyroidism     -synthroid home med, but unclear compliance. TSH, T4 nl since 2014. Will hold off for now.          Rao Fox MD  Department of Hospital Medicine   [facility]

## 2018-01-29 NOTE — PROGRESS NOTES
"[facility] Hospital Medicine  Progress Note    Patient Name: Carin Nicholas  MRN: [id number]  Patient Class: OP- Observation   Admission Date: 1/25/2018  Length of Stay: 0 days  Attending Physician: Rao Fox MD  Primary Care Provider: Arash Espinoza DO    [facility] Medicine Team: [facility] MED A Rao Fox MD    Subjective:     Principal Problem:<principal problem not specified>    HPI: Carin Nicholas is a 88 y.o. female presenting with weakness and fatigue. Pt states that she felt weak at home and thought she might "pass out." She states that appetite has been poor for 2 days.     1/26:Waxing and waning mental status w/ bouts of clear english speaking, clarity and understanding of need for SNF placement mixed w/ rapid Ukrainian speaking, and asking to leave, stating she is being held against her will.     1/27: NAEON. Oriented    1/28: oriented, no complaints.     1/29: pt w/ urinary complaints that she is having difficulty articulating. Sounds like concern for retention. Denies dysuria, f/c. Bladder scan ordered and will attempt to give similar med to mirabegron.     Review of Systems  Objective:     Vital Signs (Most Recent):  Temp: 97.9 °F (36.6 °C) (01/28/18 2329)  Pulse: 88 (01/28/18 2329)  Resp: 18 (01/28/18 2329)  BP: (!) 167/72 (01/28/18 2329)  SpO2: 96 % (01/28/18 2329) Vital Signs (24h Range):  Temp:  [97.9 °F (36.6 °C)-99 °F (37.2 °C)] 97.9 °F (36.6 °C)  Pulse:  [88-92] 88  Resp:  [17-18] 18  SpO2:  [94 %-96 %] 96 %  BP: (131-167)/(58-76) 167/72     Weight: 71.3 kg (157 lb 3 oz)  Body mass index is 30.7 kg/m².    Intake/Output Summary (Last 24 hours) at 01/29/18 1045  Last data filed at 01/29/18 0700   Gross per 24 hour   Intake              640 ml   Output                0 ml   Net              640 ml      Physical Exam  Nursing note and vitals reviewed.  Constitutional: She appears well-developed and well-nourished.   HENT:   Head: Normocephalic and atraumatic.   Eyes: " Conjunctivae are normal. Pupils are equal, round, and reactive to light.   Neck: Normal range of motion. Neck supple.   Cardiovascular: Normal rate, regular rhythm, normal heart sounds and intact distal pulses. Exam reveals no gallop and no friction rub.   No murmur heard.  Pulmonary/Chest: Breath sounds normal. No respiratory distress. She has no wheezes. She has no rhonchi. She has no rales. She exhibits no tenderness.   Abdominal: Soft. Bowel sounds are normal. She exhibits no distension. There is no tenderness. There is no rebound and no guarding. Fluid   Musculoskeletal: Normal range of motion. She exhibits edema (1+ BLE) improving. She exhibits no tenderness.   Neurological: She is alert. She has mostly normal strength, some weakness on L hip extension but she says 2/2 pain. She is intermittently oriented to person, place and January. No cranial nerve deficit or sensory deficit. GCS is 15  Skin: Skin is warm. Bruise on medial side of R knee, and R arm forearm  Psychiatric: Thought content normal. Her speech is slowed. Cognition and memory are impaired.    Significant Labs: All pertinent labs within the past 24 hours have been reviewed.    Significant Imaging: I have reviewed and interpreted all pertinent imaging results/findings within the past 24 hours.    Assessment/Plan:      Active Diagnoses:    Diagnosis Date Noted POA    Fatigue [R53.83] 01/25/2018 Yes      Problems Resolved During this Admission:    Diagnosis Date Noted Date Resolved POA     VTE Risk Mitigation         Ordered     enoxaparin injection 40 mg  Daily     Route:  Subcutaneous        01/25/18 1701     Medium Risk of VTE  Once      01/25/18 1701        Subjective weakness, Recurrent falls  Pt lives alone and feels unsteady on her feet. Good strength. Bruising from prior falls. Recent admit for fall. Son lives in [place] and they working on relocating her there. -possibly 2/2 to spinal stenosis vs orthostatic hypotension vs gait instability  (neuropathy, debility). Pt not taking Dm, thyroid or HTN meds. Some concern for diastolic heart failure based on exam. BNP nl. B12 wnl  -TTE: indeterminate diastolic dysfunc, PAP 36, pulmonary hypertension. 1 dose of IV lasix given w/ improvement in Bp.   -PT/OT eval, SNF placement         Spinal stenosis     -MRI at last admit: significant multilevel stenosis worst L3-L5. No fracture. neuro surgery evaluated pt; appreciate assistance                           Dementia     -fall precautions  -son is POA and understands that patient is heading towards needing more assistance in caring for self, agreeable to SNF this time  - Son states that he will start the process of switching [place] so that she can move to [place], where he lives  -appears to have changes in mental status. Sitter ordered. Son says this is not unusual for her.           Urge incontinence     -chronic problem, w/ new complaints 1/29  -Rx for mirabregron 25 mg PO qhs, but not available in our orders. I tried to call pharmacy twice but no answer. Similar med is oxybutynin but hesitant to give anticholinergic to elderly lady w/ waxe/wane mental status. Will try to contact pharmacy again  -f/u bladder scan  -UA neg for UTI. Cx NG       Essential hypertension     -continue home Lisinopril and norvasc. Added hctz       Type 2 diabetes mellitus with diabetic polyneuropathy     -A1C 8.9 5/2017; home meds novolog 70/30 16U AM and 8U qhs  -levemir 15u given in [facility] 1/27, 1/28, will continue moderate dose sliding scale       Hypothyroidism     -synthroid home med, but unclear compliance. TSH, T4 nl since 2014. Will hold off for now.      Dispo: SNF asap     Rao Fox MD  Department of Hospital Medicine   [facility]

## 2018-01-29 NOTE — PLAN OF CARE
[facility]     Department of Hospital Medicine     [address] [place] [address]     [phone] after hours  [phone] fax       NURSING HOME ORDERS    01/29/2018    Admit to Nursing Home:   Skilled Bed                                               Diagnoses:  Active Hospital Problems    Diagnosis  POA    *Falls [W19.XXXA]  Yes    Fatigue [R53.83]  Yes    Dementia [F03.90]  Yes    Urinary urgency [R39.15]  Yes    Essential hypertension [I10]  Yes     Chronic    Obesity, diabetes, and hypertension syndrome [E11.9, I10, E66.9]  Yes     Chronic    Long term (current) use of insulin [Z79.4]  Not Applicable     Chronic    Type 2 diabetes mellitus with diabetic polyneuropathy [E11.42]  Yes     Chronic    Hyperlipidemia [E78.5]  Yes     Chronic    Hypothyroidism [E03.9]  Yes     Chronic      Resolved Hospital Problems    Diagnosis Date Resolved POA   No resolved problems to display.       Patient is homebound due to:  Falls    Allergies:Review of patient's allergies indicates:  No Known Allergies    Vitals:       Every shift (Skilled Nursing patients)    Diet: diabetic    Acitivities: as tolerated      LABS:  Per facility protocol    CONSULTS:     Physical Therapy to evaluate and treat     Occupational Therapy to evaluate and treat     Psychiatry to evaluate and follow patients for delirium       DIABETES CARE:        Check blood sugar:       Fingerstick blood sugar AC and HS      Report CBG < 60 or > 400 to physician.                                          Insulin Sliding Scale          Glucose  Novolog Insulin Subcutaneous        0 - 60   Orange juice or glucose tablet, hold insulin      No insulin   201-250  2 units   251-300  4 units   301-350  6 units   351-400  8 units   >400   10 units then call physician      Medications: Discontinue all previous medication orders, if any. See new list below.     Carin Nicholas   Home Medication  "Instructions ELIZABETH:[id number]    Printed on:01/29/18 5748   Medication Information                      acetaminophen (TYLENOL) 650 MG TbSR  Take 1,300 mg by mouth every 6 (six) hours as needed (pain).             amLODIPine (NORVASC) 10 MG tablet  Take 1 tablet (10 mg total) by mouth once daily.             blood sugar diagnostic Strp  1 strip by Misc.(Non-Drug; Combo Route) route 3 (three) times daily.             blood-glucose meter (FREESTYLE SYSTEM KIT) kit  Use as instructed             folic acid-vit B6-vit B12 2.5-25-2 mg (FOLBIC OR EQUIV) 2.5-25-2 mg Tab  Take 1 tablet by mouth once daily.             hydroCHLOROthiazide (HYDRODIURIL) 12.5 MG Tab  Take 1 tablet (12.5 mg total) by mouth once daily.             insulin aspart protamine-insulin aspart (NOVOLOG 70/30) 100 unit/mL (70-30) InPn pen  Inject 16 units into skin with breakfast and 7 units with dinner             lancets (ONETOUCH ULTRASOFT LANCETS) Misc  1 lancet by Misc.(Non-Drug; Combo Route) route 3 (three) times daily.             levothyroxine (SYNTHROID) 50 MCG tablet  Take 1 tablet (50 mcg total) by mouth before breakfast.             lisinopril (PRINIVIL,ZESTRIL) 40 MG tablet  Take 1 tablet (40 mg total) by mouth once daily.             mirabegron (MYRBETRIQ) 25 mg Tb24 ER tablet  Take 1 tablet (25 mg total) by mouth once daily.             NOVOFINE 32 32 gauge x 1/4" Ndle  Use twice daily             senna-docusate 8.6-50 mg (PERICOLACE) 8.6-50 mg per tablet  Take 1 tablet by mouth 2 (two) times daily.             vitamin D 1000 units Tab  Take 2 tablets (2,000 Units total) by mouth once daily.                       _________________________________  Rao Fox MD  01/29/2018  "

## 2018-01-29 NOTE — PT/OT/SLP PROGRESS
Occupational Therapy   Treatment    Name: Carin Nicholas  MRN: [id number]  Admitting Diagnosis:  <principal problem not specified>       Recommendations:     Discharge Recommendations: nursing facility, skilled  Discharge Equipment Recommendations:  walker, rolling  Barriers to discharge:  Decreased caregiver support    Subjective     Communicated with: RN prior to session.  Pain/Comfort:  · Pain Rating 1: 0/10  · Pain Rating Post-Intervention 1: 0/10    Patients cultural, spiritual, Mandaen conflicts given the current situation: none stated    Objective:     Patient found with:      General Precautions: Standard, fall   Orthopedic Precautions:N/A   Braces: N/A     Occupational Performance:    Bed Mobility:    · Patient completed Rolling/Turning to Left with  minimum assistance  · Patient completed Rolling/Turning to Right with minimum assistance  · Patient completed Scooting/Bridging with minimum assistance  · Patient completed Supine to Sit with minimum assistance     Functional Mobility/Transfers:  · Patient completed Sit <> Stand Transfer with moderate assistance  with  rolling walker   · Patient completed Bed <> Chair Transfer using Stand Pivot technique with moderate assistance with rolling walker and mod verbal cues for RW management   · Functional Mobility: Pt ambulated 6 steps at mod a for verbal cues for RW management.     Activities of Daily Living:  · Feeding:  independence k  · UB Dressing: minimum assistance donned robe as gown  · LB Dressing: maximal assistance donned/doffed socks  · Toileting: minimum assistance min a for rolling on bed pain to void    Patient left up in chair with all lines intact, call button in reach and RN notified    AMPAC 6 Click:  AMPAC Total Score: 16    Treatment & Education:  Pt educated on POC.   Pt educated on need for SNF placement  Pt educated on benefits of therapy and safety risk off returning home at [facility].  Education:    Assessment:     Carin Nicholas is a 88 y.o. female  with a medical diagnosis of <principal problem not specified>.  She presents with impairments listed below. Pt displayed limited strength and endurance w/ oob activities. Pt displayed limited ability to perform LBD. Pt would benefit from skilled OT services to improve independence and overall occupational functioning.      Performance deficits affecting function are weakness, impaired endurance, impaired self care skills, impaired functional mobilty, gait instability, impaired balance.      Rehab Prognosis:  good; patient would benefit from acute skilled OT services to address these deficits and reach maximum level of function.       Plan:     Patient to be seen 3 x/week to address the above listed problems via self-care/home management, therapeutic activities, therapeutic exercises  · Plan of Care Expires: 02/26/18  · Plan of Care Reviewed with: patient    This Plan of care has been discussed with the patient who was involved in its development and understands and is in agreement with the identified goals and treatment plan    GOALS:    Occupational Therapy Goals        Problem: Occupational Therapy Goal    Goal Priority Disciplines Outcome Interventions   Occupational Therapy Goal     OT, PT/OT Ongoing (interventions implemented as appropriate)    Description:  Goals to be met by: 2/10/18     Patient will increase functional independence with ADLs by performing:    UE Dressing with Stand-by Assistance.  LE Dressing with Minimal Assistance.  Grooming while standing at sink with Contact Guard Assistance.  Toileting from toilet with Minimal Assistance for hygiene and clothing management.   Supine to sit with Supervision.  Stand pivot transfers with Contact Guard Assistance.  Toilet transfer to toilet with Contact Guard Assistance.             Problem: Occupational Therapy Goal    Goal Priority Disciplines Outcome Interventions   Occupational Therapy Goal     OT, PT/OT                     Time Tracking:     OT Date of  Treatment: 01/29/18  OT Start Time: 0911  OT Stop Time: 0936  OT Total Time (min): 25 min    Billable Minutes:Self Care/Home Management 13 minutes  Therapeutic Activity 12 minutes    Nicolas Parikh OT  1/29/2018

## 2018-02-01 NOTE — DISCHARGE SUMMARY
"[facility] Hospital Medicine  Discharge Summary      Patient Name: Carin Nicholas  MRN: [id number]  Admission Date: 1/25/2018  Hospital Length of Stay: 0 days  Discharge Date and Time: 1/29/2018  6:09 PM  Attending Physician: Desmond Rhodes MD   Discharging Provider: Rao Fox MD  Primary Care Provider: Arash Espinoza DO    [facility] Medicine Team: [facility]OSP MED A Rao Fox MD    HPI: Carin Nicholas is a 88 y.o. female presenting with weakness and fatigue. Pt states that she felt weak at home and thought she might "pass out." She states that appetite has been poor for 2 days.      1/26:Waxing and waning mental status w/ bouts of clear english speaking, clarity and understanding of need for SNF placement mixed w/ rapid Turkmen speaking, and asking to leave, stating she is being held against her will.      1/27: NAEON. Oriented     1/28: oriented, no complaints.      1/29: pt w/ urinary complaints that she is having difficulty articulating. Sounds like concern for retention. Denies dysuria, f/c. Bladder scan ordered and will attempt to give similar med to mirabegron.     Final Active Diagnoses:    Diagnosis Date Noted POA    PRINCIPAL PROBLEM:  Falls [W19.XXXA] 01/29/2018 Yes    Fatigue [R53.83] 01/25/2018 Yes    Dementia [F03.90] 01/12/2018 Yes    Urinary urgency [R39.15] 11/09/2016 Yes    Essential hypertension [I10] 09/26/2016 Yes     Chronic    Obesity, diabetes, and hypertension syndrome [E11.9, I10, E66.9] 07/15/2016 Yes     Chronic    Long term (current) use of insulin [Z79.4] 07/15/2016 Not Applicable     Chronic    Type 2 diabetes mellitus with diabetic polyneuropathy [E11.42] 11/24/2015 Yes     Chronic    Hyperlipidemia [E78.5] 11/19/2014 Yes     Chronic    Hypothyroidism [E03.9] 11/19/2014 Yes     Chronic      Problems Resolved During this Admission:    Diagnosis Date Noted Date Resolved POA      Discharged Condition: stable    Disposition: Skilled Nursing " Facility    Follow Up:    Patient Instructions:     Diet diabetic     Activity as tolerated     Notify your health care provider if you experience any of the following:  persistent dizziness, light-headedness, or visual disturbances       Medications:  Reconciled Home Medications:   Discharge Medication List as of 1/29/2018  5:43 PM      START taking these medications    Details   hydroCHLOROthiazide (HYDRODIURIL) 12.5 MG Tab Take 1 tablet (12.5 mg total) by mouth once daily., Starting Tue 1/30/2018, Until Wed 1/30/2019, Normal         CONTINUE these medications which have CHANGED    Details   amLODIPine (NORVASC) 10 MG tablet Take 1 tablet (10 mg total) by mouth once daily., Starting Tue 1/30/2018, Until Wed 1/30/2019, Normal         CONTINUE these medications which have NOT CHANGED    Details   acetaminophen (TYLENOL) 650 MG TbSR Take 1,300 mg by mouth every 6 (six) hours as needed (pain)., Historical Med      blood sugar diagnostic Strp 1 strip by Misc.(Non-Drug; Combo Route) route 3 (three) times daily., Starting Fri 6/2/2017, Print      blood-glucose meter (FREESTYLE SYSTEM KIT) kit Use as instructed, Print      folic acid-vit B6-vit B12 2.5-25-2 mg (FOLBIC OR EQUIV) 2.5-25-2 mg Tab Take 1 tablet by mouth once daily., Starting Thu 8/31/2017, Normal      insulin aspart protamine-insulin aspart (NOVOLOG 70/30) 100 unit/mL (70-30) InPn pen Inject 16 units into skin with breakfast and 7 units with dinner, Normal      lancets (ONETOUCH ULTRASOFT LANCETS) Misc 1 lancet by Misc.(Non-Drug; Combo Route) route 3 (three) times daily., Starting Fri 6/2/2017, Print      levothyroxine (SYNTHROID) 50 MCG tablet Take 1 tablet (50 mcg total) by mouth before breakfast., Starting 4/10/2017, Until Discontinued, Normal      lisinopril (PRINIVIL,ZESTRIL) 40 MG tablet Take 1 tablet (40 mg total) by mouth once daily., Starting Fri 11/24/2017, Normal      mirabegron (MYRBETRIQ) 25 mg Tb24 ER tablet Take 1 tablet (25 mg total) by mouth  "once daily., Starting 4/17/2017, Until Tue 4/17/18, Normal      NOVOFINE 32 32 gauge x 1/4" Ndle Use twice daily, Starting Sun 5/28/2017, Historical Med      senna-docusate 8.6-50 mg (PERICOLACE) 8.6-50 mg per tablet Take 1 tablet by mouth 2 (two) times daily., Starting Tue 5/23/2017, OTC      vitamin D 1000 units Tab Take 2 tablets (2,000 Units total) by mouth once daily., Starting Thu 5/25/2017, OTC         STOP taking these medications       carvedilol (COREG) 6.25 MG tablet Comments:   Reason for Stopping:         celecoxib (CELEBREX) 100 MG capsule Comments:   Reason for Stopping:               Significant Diagnostic Studies: Labs: All labs within the past 24 hours have been reviewed    Pending Diagnostic Studies:     None        Indwelling Lines/Drains at time of discharge:   Lines/Drains/Airways          No matching active lines, drains, or airways      /70 (BP Location: Right arm, Patient Position: Lying)   Pulse 92   Temp 98.2 °F (36.8 °C) (Oral)   Resp 20   Ht 5' (1.524 m)   Wt 71.3 kg (157 lb 3 oz)   SpO2 96%   Breastfeeding? No   BMI 30.70 kg/m²   Nursing note and vitals reviewed.  Constitutional: She appears well-developed and well-nourished.   HENT:   Head: Normocephalic and atraumatic.   Eyes: Conjunctivae are normal. Pupils are equal, round, and reactive to light.   Neck: Normal range of motion. Neck supple.   Cardiovascular: Normal rate, regular rhythm, normal heart sounds and intact distal pulses. Exam reveals no gallop and no friction rub.   No murmur heard.  Pulmonary/Chest: Breath sounds normal. No respiratory distress. She has no wheezes. She has no rhonchi. She has no rales. She exhibits no tenderness.   Abdominal: Soft. Bowel sounds are normal. She exhibits no distension. There is no tenderness. There is no rebound and no guarding. Fluid   Musculoskeletal: Normal range of motion. She exhibits edema (1+ BLE) improving. She exhibits no tenderness.   Neurological: She is alert. She " has mostly normal strength, some weakness on L hip extension but she says 2/2 pain. She is intermittently oriented to person, place and January. No cranial nerve deficit or sensory deficit. GCS is 15  Skin: Skin is warm. Bruise on medial side of R knee, and R arm forearm  Psychiatric: Thought content normal. Her speech is slowed. Cognition and memory are impaired.      Subjective weakness, Recurrent falls  Pt lives alone and feels unsteady on her feet. Good strength. Bruising from prior falls. Recent admit for fall. Son lives in [place] and they working on relocating her there. -possibly 2/2 to spinal stenosis vs orthostatic hypotension vs gait instability (neuropathy, debility). Pt not taking Dm, thyroid or HTN meds. Some concern for diastolic heart failure based on exam. BNP nl. B12 wnl  -TTE: indeterminate diastolic dysfunc, PAP 36, pulmonary hypertension. 1 dose of IV lasix given w/ improvement in Bp.   -PT/OT eval, SNF placement           Spinal stenosis     -MRI at last admit: significant multilevel stenosis worst L3-L5. No fracture. neuro surgery evaluated pt; appreciate assistance                           Dementia     -fall precautions  -son is POA and understands that patient is heading towards needing more assistance in caring for self, agreeable to SNF this time  - Son states that he will start the process of [place] so that she can move to [place], where he lives  -appears to have changes in mental status. Son says this is not unusual for her.           Urge incontinence     -chronic problem, w/ new complaints 1/29  -Rx for mirabregron 25 mg PO qhs, but not available in our orders. I tried to call pharmacy twice but no answer. Similar med is oxybutynin but hesitant to give anticholinergic to elderly lady w/ waxe/wane mental status. Gave muscurinic alternative  -bladder scan w/ 180cc  -UA neg for UTI. Cx NG       Essential hypertension     -continue home Lisinopril and  norvasc. Added hctz       Type 2 diabetes mellitus with diabetic polyneuropathy     -A1C 8.9 5/2017; home meds novolog 70/30 16U AM and 8U qhs  -levemir 15u given in [facility] 1/27, 1/28, will continue moderate dose sliding scale       Hypothyroidism     -synthroid home med, but unclear compliance. TSH, T4 nl since 2014. Will hold off for now.       Dispo: SNF      Time spent on the discharge of patient: <30 minutes  Patient was seen and examined on the date of discharge and determined to be suitable for discharge.         Rao Fox MD  Department of Hospital Medicine  [facility]

## 2018-09-11 NOTE — TELEPHONE ENCOUNTER
----- Message from Iesha Arora sent at 4/26/2017  2:14 PM CDT -----  Contact: patient  Doctor appointment and lab have been scheduled.  Please link lab orders to the lab appointment.  Date of doctor appointment:  5-23  Physical or EP:  physical  Date of lab appointment:  5-16  Comments:             Chief Complaint: LE weakness, slurred speech    Interval Events: Noted to have slurred speech, RUE and RLE weakness last night.    Review of Systems:  General: No fevers, chills, weight loss or gain  Skin: No rashes, color changes  Cardiovascular: No chest pain, orthopnea  Respiratory: No shortness of breath, cough  Gastrointestinal: No nausea, abdominal pain  Genitourinary: No incontinence, pain with urination  Musculoskeletal: No pain, swelling, decreased range of motion  Neurological: No headache, (+) weakness  Psychiatric: No depression, anxiety  Endocrine: No weight loss or gain, increased thirst  All other systems are comprehensively negative.    Physical Exam:  Vitals:        Vital Signs Last 24 Hrs  T(C): 36.6 (11 Sep 2018 04:59), Max: 36.8 (10 Sep 2018 09:50)  T(F): 97.8 (11 Sep 2018 04:59), Max: 98.2 (10 Sep 2018 09:50)  HR: 92 (11 Sep 2018 04:59) (73 - 98)  BP: 99/62 (11 Sep 2018 04:59) (99/62 - 212/119)  BP(mean): --  RR: 16 (11 Sep 2018 04:59) (14 - 18)  SpO2: 96% (11 Sep 2018 04:59) (95% - 99%)  General: NAD  HEENT: MMM  Neck: No JVD, no carotid bruit  Lungs: CTAB  CV: RRR, nl S1/S2, no M/R/G  Abdomen: S/NT/ND, +BS  Extremities: No LE edema, no cyanosis  Neuro: AAOx3, non-focal  Skin: No rash    Labs:                        12.2   4.98  )-----------( 151      ( 10 Sep 2018 10:12 )             36.2     09-10    145  |  112<H>  |  32<H>  ----------------------------<  104<H>  4.1   |  26  |  1.50<H>    Ca    8.5      10 Sep 2018 10:12    TPro  6.1  /  Alb  3.2<L>  /  TBili  0.5  /  DBili  x   /  AST  19  /  ALT  25  /  AlkPhos  71  09-10        PT/INR - ( 10 Sep 2018 10:12 )   PT: 11.9 sec;   INR: 1.09 ratio         PTT - ( 10 Sep 2018 10:12 )  PTT:30.7 sec    Telemetry: Sinus rhythm

## 2020-05-07 DIAGNOSIS — U07.1 COVID-19 VIRUS DETECTED: ICD-10-CM

## 2020-05-14 NOTE — PHYSICIAN QUERY
PT Name: Carin Nicholas  MR #: [id number]     Physician Query Form - Etiology of Condition Clarification      CDS/: Nico Slaughter               Contact information: [place] [id number]  This form is a permanent document in the medical record.     Query Date: May 25, 2017    By submitting this query, we are merely seeking further clarification of documentation.  Please utilize your independent clinical judgment when addressing the question(s) below.     The medical record contains the following:    Findings Supporting Clinical Information Location in Medical Record   Metabolic encephalopathy   - sepsis vs CVA vs dementia   - no leukocytosis or recorded fever, however, can not r/o infectious process   - lactate, UA, urine cx, bcxs   - CT head   - if lactate elevated, will order abdominal U/S given abdominal complaints and will consider starting on cipro and flagyl for abdominal process   H/P 5/22     Please document your best medical opinion regarding the etiology of ____Metabolic Encephalopathy_____________ for which the primary focus of treatment is/was directed.         2/2 UTI with suspected history of dementia                [    ]  Clinically Undetermined    Please document in your progress notes daily for the duration of treatment, until resolved, and include in your discharge summary.                                                                                 Spoke with Alexx and he will be at [facility] on Monday and will pick this form up then. Placed at  for pickup on Monday. No further questions

## 2021-05-14 ENCOUNTER — LAB VISIT (OUTPATIENT)
Dept: LAB | Facility: OTHER | Age: 86
End: 2021-05-14
Payer: MEDICARE

## 2021-05-14 DIAGNOSIS — Z20.822 ENCOUNTER FOR LABORATORY TESTING FOR COVID-19 VIRUS: ICD-10-CM

## 2021-05-14 PROCEDURE — U0003 INFECTIOUS AGENT DETECTION BY NUCLEIC ACID (DNA OR RNA); SEVERE ACUTE RESPIRATORY SYNDROME CORONAVIRUS 2 (SARS-COV-2) (CORONAVIRUS DISEASE [COVID-19]), AMPLIFIED PROBE TECHNIQUE, MAKING USE OF HIGH THROUGHPUT TECHNOLOGIES AS DESCRIBED BY CMS-2020-01-R: HCPCS | Performed by: NURSE PRACTITIONER

## 2021-05-15 LAB — SARS-COV-2 RNA RESP QL NAA+PROBE: NOT DETECTED

## 2021-08-20 ENCOUNTER — LAB VISIT (OUTPATIENT)
Dept: LAB | Facility: OTHER | Age: 86
End: 2021-08-20
Payer: MEDICARE

## 2021-08-20 DIAGNOSIS — Z20.822 ENCOUNTER FOR LABORATORY TESTING FOR COVID-19 VIRUS: ICD-10-CM

## 2021-08-20 PROCEDURE — U0003 INFECTIOUS AGENT DETECTION BY NUCLEIC ACID (DNA OR RNA); SEVERE ACUTE RESPIRATORY SYNDROME CORONAVIRUS 2 (SARS-COV-2) (CORONAVIRUS DISEASE [COVID-19]), AMPLIFIED PROBE TECHNIQUE, MAKING USE OF HIGH THROUGHPUT TECHNOLOGIES AS DESCRIBED BY CMS-2020-01-R: HCPCS | Performed by: NURSE PRACTITIONER

## 2021-08-21 LAB
SARS-COV-2 RNA RESP QL NAA+PROBE: NOT DETECTED
SARS-COV-2- CYCLE NUMBER: -1

## 2021-08-27 ENCOUNTER — LAB VISIT (OUTPATIENT)
Dept: LAB | Facility: OTHER | Age: 86
End: 2021-08-27
Payer: MEDICARE

## 2021-08-27 DIAGNOSIS — Z20.822 ENCOUNTER FOR LABORATORY TESTING FOR COVID-19 VIRUS: ICD-10-CM

## 2021-08-27 PROCEDURE — U0003 INFECTIOUS AGENT DETECTION BY NUCLEIC ACID (DNA OR RNA); SEVERE ACUTE RESPIRATORY SYNDROME CORONAVIRUS 2 (SARS-COV-2) (CORONAVIRUS DISEASE [COVID-19]), AMPLIFIED PROBE TECHNIQUE, MAKING USE OF HIGH THROUGHPUT TECHNOLOGIES AS DESCRIBED BY CMS-2020-01-R: HCPCS | Performed by: NURSE PRACTITIONER

## 2021-08-29 LAB
SARS-COV-2 RNA RESP QL NAA+PROBE: NOT DETECTED
SARS-COV-2- CYCLE NUMBER: NORMAL

## 2021-09-10 ENCOUNTER — LAB VISIT (OUTPATIENT)
Dept: LAB | Facility: OTHER | Age: 86
End: 2021-09-10
Payer: MEDICARE

## 2021-09-10 DIAGNOSIS — Z20.822 ENCOUNTER FOR LABORATORY TESTING FOR COVID-19 VIRUS: ICD-10-CM

## 2021-09-10 PROCEDURE — U0003 INFECTIOUS AGENT DETECTION BY NUCLEIC ACID (DNA OR RNA); SEVERE ACUTE RESPIRATORY SYNDROME CORONAVIRUS 2 (SARS-COV-2) (CORONAVIRUS DISEASE [COVID-19]), AMPLIFIED PROBE TECHNIQUE, MAKING USE OF HIGH THROUGHPUT TECHNOLOGIES AS DESCRIBED BY CMS-2020-01-R: HCPCS | Performed by: NURSE PRACTITIONER

## 2021-09-11 LAB
SARS-COV-2 RNA RESP QL NAA+PROBE: NOT DETECTED
SARS-COV-2- CYCLE NUMBER: NORMAL

## 2021-09-24 ENCOUNTER — LAB VISIT (OUTPATIENT)
Dept: LAB | Facility: OTHER | Age: 86
End: 2021-09-24
Payer: MEDICARE

## 2021-09-24 DIAGNOSIS — Z20.822 ENCOUNTER FOR LABORATORY TESTING FOR COVID-19 VIRUS: ICD-10-CM

## 2021-09-24 PROCEDURE — U0003 INFECTIOUS AGENT DETECTION BY NUCLEIC ACID (DNA OR RNA); SEVERE ACUTE RESPIRATORY SYNDROME CORONAVIRUS 2 (SARS-COV-2) (CORONAVIRUS DISEASE [COVID-19]), AMPLIFIED PROBE TECHNIQUE, MAKING USE OF HIGH THROUGHPUT TECHNOLOGIES AS DESCRIBED BY CMS-2020-01-R: HCPCS | Performed by: NURSE PRACTITIONER

## 2021-09-25 LAB
SARS-COV-2 RNA RESP QL NAA+PROBE: NOT DETECTED
SARS-COV-2- CYCLE NUMBER: NORMAL

## 2021-10-29 ENCOUNTER — PES CALL (OUTPATIENT)
Dept: ADMINISTRATIVE | Facility: CLINIC | Age: 86
End: 2021-10-29
Payer: MEDICARE

## 2021-11-04 ENCOUNTER — PES CALL (OUTPATIENT)
Dept: ADMINISTRATIVE | Facility: CLINIC | Age: 86
End: 2021-11-04
Payer: MEDICARE

## 2021-12-31 ENCOUNTER — LAB VISIT (OUTPATIENT)
Dept: LAB | Facility: OTHER | Age: 86
End: 2021-12-31
Payer: MEDICARE

## 2021-12-31 DIAGNOSIS — Z20.822 ENCOUNTER FOR LABORATORY TESTING FOR COVID-19 VIRUS: ICD-10-CM

## 2021-12-31 PROCEDURE — U0003 INFECTIOUS AGENT DETECTION BY NUCLEIC ACID (DNA OR RNA); SEVERE ACUTE RESPIRATORY SYNDROME CORONAVIRUS 2 (SARS-COV-2) (CORONAVIRUS DISEASE [COVID-19]), AMPLIFIED PROBE TECHNIQUE, MAKING USE OF HIGH THROUGHPUT TECHNOLOGIES AS DESCRIBED BY CMS-2020-01-R: HCPCS | Performed by: NURSE PRACTITIONER

## 2022-01-05 LAB
SARS-COV-2 RNA RESP QL NAA+PROBE: NOT DETECTED
SARS-COV-2- CYCLE NUMBER: NORMAL

## 2022-01-07 ENCOUNTER — LAB VISIT (OUTPATIENT)
Dept: LAB | Facility: OTHER | Age: 87
End: 2022-01-07
Payer: MEDICARE

## 2022-01-07 DIAGNOSIS — Z20.822 ENCOUNTER FOR LABORATORY TESTING FOR COVID-19 VIRUS: ICD-10-CM

## 2022-01-07 PROCEDURE — U0003 INFECTIOUS AGENT DETECTION BY NUCLEIC ACID (DNA OR RNA); SEVERE ACUTE RESPIRATORY SYNDROME CORONAVIRUS 2 (SARS-COV-2) (CORONAVIRUS DISEASE [COVID-19]), AMPLIFIED PROBE TECHNIQUE, MAKING USE OF HIGH THROUGHPUT TECHNOLOGIES AS DESCRIBED BY CMS-2020-01-R: HCPCS | Performed by: NURSE PRACTITIONER

## 2022-01-08 LAB
SARS-COV-2 RNA RESP QL NAA+PROBE: NOT DETECTED
SARS-COV-2- CYCLE NUMBER: NORMAL

## 2022-01-14 ENCOUNTER — LAB VISIT (OUTPATIENT)
Dept: LAB | Facility: OTHER | Age: 87
End: 2022-01-14
Payer: MEDICARE

## 2022-01-14 DIAGNOSIS — Z20.822 ENCOUNTER FOR LABORATORY TESTING FOR COVID-19 VIRUS: ICD-10-CM

## 2022-01-14 PROCEDURE — U0003 INFECTIOUS AGENT DETECTION BY NUCLEIC ACID (DNA OR RNA); SEVERE ACUTE RESPIRATORY SYNDROME CORONAVIRUS 2 (SARS-COV-2) (CORONAVIRUS DISEASE [COVID-19]), AMPLIFIED PROBE TECHNIQUE, MAKING USE OF HIGH THROUGHPUT TECHNOLOGIES AS DESCRIBED BY CMS-2020-01-R: HCPCS | Performed by: NURSE PRACTITIONER

## 2022-01-15 LAB
SARS-COV-2 RNA RESP QL NAA+PROBE: NOT DETECTED
SARS-COV-2- CYCLE NUMBER: NORMAL

## 2022-01-21 ENCOUNTER — LAB VISIT (OUTPATIENT)
Dept: LAB | Facility: OTHER | Age: 87
End: 2022-01-21
Payer: MEDICARE

## 2022-01-21 DIAGNOSIS — Z20.822 ENCOUNTER FOR LABORATORY TESTING FOR COVID-19 VIRUS: ICD-10-CM

## 2022-01-21 PROCEDURE — U0003 INFECTIOUS AGENT DETECTION BY NUCLEIC ACID (DNA OR RNA); SEVERE ACUTE RESPIRATORY SYNDROME CORONAVIRUS 2 (SARS-COV-2) (CORONAVIRUS DISEASE [COVID-19]), AMPLIFIED PROBE TECHNIQUE, MAKING USE OF HIGH THROUGHPUT TECHNOLOGIES AS DESCRIBED BY CMS-2020-01-R: HCPCS | Performed by: EMERGENCY MEDICINE

## 2022-01-22 LAB
SARS-COV-2 RNA RESP QL NAA+PROBE: NOT DETECTED
SARS-COV-2- CYCLE NUMBER: NORMAL

## 2022-01-28 ENCOUNTER — LAB VISIT (OUTPATIENT)
Dept: LAB | Facility: OTHER | Age: 87
End: 2022-01-28
Payer: MEDICARE

## 2022-01-28 DIAGNOSIS — Z20.822 ENCOUNTER FOR LABORATORY TESTING FOR COVID-19 VIRUS: ICD-10-CM

## 2022-01-28 PROCEDURE — U0003 INFECTIOUS AGENT DETECTION BY NUCLEIC ACID (DNA OR RNA); SEVERE ACUTE RESPIRATORY SYNDROME CORONAVIRUS 2 (SARS-COV-2) (CORONAVIRUS DISEASE [COVID-19]), AMPLIFIED PROBE TECHNIQUE, MAKING USE OF HIGH THROUGHPUT TECHNOLOGIES AS DESCRIBED BY CMS-2020-01-R: HCPCS | Performed by: EMERGENCY MEDICINE

## 2022-01-29 LAB
SARS-COV-2 RNA RESP QL NAA+PROBE: NOT DETECTED
SARS-COV-2- CYCLE NUMBER: NORMAL

## 2022-02-04 ENCOUNTER — LAB VISIT (OUTPATIENT)
Dept: LAB | Facility: OTHER | Age: 87
End: 2022-02-04
Payer: MEDICARE

## 2022-02-04 DIAGNOSIS — Z20.822 ENCOUNTER FOR LABORATORY TESTING FOR COVID-19 VIRUS: ICD-10-CM

## 2022-02-04 PROCEDURE — U0003 INFECTIOUS AGENT DETECTION BY NUCLEIC ACID (DNA OR RNA); SEVERE ACUTE RESPIRATORY SYNDROME CORONAVIRUS 2 (SARS-COV-2) (CORONAVIRUS DISEASE [COVID-19]), AMPLIFIED PROBE TECHNIQUE, MAKING USE OF HIGH THROUGHPUT TECHNOLOGIES AS DESCRIBED BY CMS-2020-01-R: HCPCS | Performed by: EMERGENCY MEDICINE

## 2022-02-05 LAB
SARS-COV-2 RNA RESP QL NAA+PROBE: NOT DETECTED
SARS-COV-2- CYCLE NUMBER: NORMAL

## 2022-02-11 ENCOUNTER — LAB VISIT (OUTPATIENT)
Dept: LAB | Facility: OTHER | Age: 87
End: 2022-02-11
Payer: MEDICARE

## 2022-02-11 DIAGNOSIS — Z20.822 ENCOUNTER FOR LABORATORY TESTING FOR COVID-19 VIRUS: ICD-10-CM

## 2022-02-11 PROCEDURE — U0003 INFECTIOUS AGENT DETECTION BY NUCLEIC ACID (DNA OR RNA); SEVERE ACUTE RESPIRATORY SYNDROME CORONAVIRUS 2 (SARS-COV-2) (CORONAVIRUS DISEASE [COVID-19]), AMPLIFIED PROBE TECHNIQUE, MAKING USE OF HIGH THROUGHPUT TECHNOLOGIES AS DESCRIBED BY CMS-2020-01-R: HCPCS | Performed by: EMERGENCY MEDICINE

## 2022-02-12 LAB
SARS-COV-2 RNA RESP QL NAA+PROBE: NOT DETECTED
SARS-COV-2- CYCLE NUMBER: NORMAL

## 2022-02-18 ENCOUNTER — LAB VISIT (OUTPATIENT)
Dept: LAB | Facility: OTHER | Age: 87
End: 2022-02-18
Payer: MEDICARE

## 2022-02-18 DIAGNOSIS — Z20.822 ENCOUNTER FOR LABORATORY TESTING FOR COVID-19 VIRUS: ICD-10-CM

## 2022-02-18 PROCEDURE — U0003 INFECTIOUS AGENT DETECTION BY NUCLEIC ACID (DNA OR RNA); SEVERE ACUTE RESPIRATORY SYNDROME CORONAVIRUS 2 (SARS-COV-2) (CORONAVIRUS DISEASE [COVID-19]), AMPLIFIED PROBE TECHNIQUE, MAKING USE OF HIGH THROUGHPUT TECHNOLOGIES AS DESCRIBED BY CMS-2020-01-R: HCPCS | Performed by: EMERGENCY MEDICINE

## 2022-02-19 LAB — SARS-COV-2 RNA RESP QL NAA+PROBE: NOT DETECTED

## 2022-02-25 ENCOUNTER — LAB VISIT (OUTPATIENT)
Dept: LAB | Facility: OTHER | Age: 87
End: 2022-02-25
Payer: MEDICARE

## 2022-02-25 DIAGNOSIS — Z20.822 ENCOUNTER FOR LABORATORY TESTING FOR COVID-19 VIRUS: ICD-10-CM

## 2022-02-25 PROCEDURE — U0003 INFECTIOUS AGENT DETECTION BY NUCLEIC ACID (DNA OR RNA); SEVERE ACUTE RESPIRATORY SYNDROME CORONAVIRUS 2 (SARS-COV-2) (CORONAVIRUS DISEASE [COVID-19]), AMPLIFIED PROBE TECHNIQUE, MAKING USE OF HIGH THROUGHPUT TECHNOLOGIES AS DESCRIBED BY CMS-2020-01-R: HCPCS | Performed by: EMERGENCY MEDICINE

## 2022-02-26 LAB
SARS-COV-2 RNA RESP QL NAA+PROBE: NOT DETECTED
SARS-COV-2- CYCLE NUMBER: NORMAL

## 2022-04-20 ENCOUNTER — PATIENT OUTREACH (OUTPATIENT)
Dept: ADMINISTRATIVE | Facility: CLINIC | Age: 87
End: 2022-04-20
Payer: MEDICARE

## 2022-05-13 ENCOUNTER — LAB VISIT (OUTPATIENT)
Dept: LAB | Facility: OTHER | Age: 87
End: 2022-05-13
Payer: MEDICARE

## 2022-05-13 DIAGNOSIS — Z20.822 ENCOUNTER FOR LABORATORY TESTING FOR COVID-19 VIRUS: ICD-10-CM

## 2022-05-13 PROCEDURE — U0003 INFECTIOUS AGENT DETECTION BY NUCLEIC ACID (DNA OR RNA); SEVERE ACUTE RESPIRATORY SYNDROME CORONAVIRUS 2 (SARS-COV-2) (CORONAVIRUS DISEASE [COVID-19]), AMPLIFIED PROBE TECHNIQUE, MAKING USE OF HIGH THROUGHPUT TECHNOLOGIES AS DESCRIBED BY CMS-2020-01-R: HCPCS | Performed by: EMERGENCY MEDICINE

## 2022-05-14 LAB — SARS-COV-2 RNA RESP QL NAA+PROBE: NOT DETECTED

## 2024-04-19 NOTE — PLAN OF CARE
Problem: Patient Care Overview  Goal: Plan of Care Review  Outcome: Ongoing (interventions implemented as appropriate)  Patient AAO,VSS. Complains of bladder pain . POC reviewed with patient. Tele monitor and nonskid socks placed on patient. Bedside commode  provided at bedside. Bed in lowest position with wheels locked. Call bell and side table in reach of patient. Will continue to monitor patient for pain          Personal collateral